# Patient Record
Sex: MALE | Race: BLACK OR AFRICAN AMERICAN | NOT HISPANIC OR LATINO | Employment: OTHER | URBAN - METROPOLITAN AREA
[De-identification: names, ages, dates, MRNs, and addresses within clinical notes are randomized per-mention and may not be internally consistent; named-entity substitution may affect disease eponyms.]

---

## 2022-06-07 PROBLEM — K92.2 ACUTE UPPER GASTROINTESTINAL HEMORRHAGE: Status: ACTIVE | Noted: 2022-06-07

## 2022-06-08 PROBLEM — D50.0 IRON DEFICIENCY ANEMIA DUE TO CHRONIC BLOOD LOSS: Status: ACTIVE | Noted: 2022-06-08

## 2022-06-15 DIAGNOSIS — B96.81 CHRONIC HELICOBACTER PYLORI GASTRITIS: Primary | ICD-10-CM

## 2022-06-15 DIAGNOSIS — K29.50 CHRONIC HELICOBACTER PYLORI GASTRITIS: Primary | ICD-10-CM

## 2022-06-15 RX ORDER — AMOXICILLIN 500 MG/1
1000 CAPSULE ORAL 2 TIMES DAILY
Qty: 56 CAPSULE | Refills: 0 | Status: SHIPPED | OUTPATIENT
Start: 2022-06-15 | End: 2022-06-29

## 2022-06-15 RX ORDER — PANTOPRAZOLE SODIUM 40 MG/1
40 TABLET, DELAYED RELEASE ORAL 2 TIMES DAILY
Qty: 28 TABLET | Refills: 0 | Status: SHIPPED | OUTPATIENT
Start: 2022-06-15 | End: 2022-07-07

## 2022-06-15 RX ORDER — CLARITHROMYCIN 500 MG/1
500 TABLET, FILM COATED ORAL 2 TIMES DAILY
Qty: 28 TABLET | Refills: 0 | Status: SHIPPED | OUTPATIENT
Start: 2022-06-15 | End: 2022-06-29

## 2022-06-16 ENCOUNTER — TELEPHONE (OUTPATIENT)
Dept: GASTROENTEROLOGY | Facility: CLINIC | Age: 67
End: 2022-06-16
Payer: MEDICAID

## 2022-06-16 NOTE — TELEPHONE ENCOUNTER
Patient called me back and was given the results and the instructions of the medication.. He has a appt 7/7/2022

## 2022-07-07 ENCOUNTER — OFFICE VISIT (OUTPATIENT)
Dept: GASTROENTEROLOGY | Facility: CLINIC | Age: 67
End: 2022-07-07
Payer: MEDICARE

## 2022-07-07 VITALS
HEART RATE: 83 BPM | SYSTOLIC BLOOD PRESSURE: 127 MMHG | HEIGHT: 73 IN | WEIGHT: 199.31 LBS | DIASTOLIC BLOOD PRESSURE: 69 MMHG | BODY MASS INDEX: 26.42 KG/M2 | OXYGEN SATURATION: 97 %

## 2022-07-07 DIAGNOSIS — K29.50 CHRONIC HELICOBACTER PYLORI GASTRITIS: ICD-10-CM

## 2022-07-07 DIAGNOSIS — B96.81 CHRONIC HELICOBACTER PYLORI GASTRITIS: ICD-10-CM

## 2022-07-07 DIAGNOSIS — D50.0 IRON DEFICIENCY ANEMIA DUE TO CHRONIC BLOOD LOSS: ICD-10-CM

## 2022-07-07 DIAGNOSIS — R10.13 EPIGASTRIC PAIN: Primary | ICD-10-CM

## 2022-07-07 PROCEDURE — 1101F PR PT FALLS ASSESS DOC 0-1 FALLS W/OUT INJ PAST YR: ICD-10-PCS | Mod: CPTII,S$GLB,, | Performed by: INTERNAL MEDICINE

## 2022-07-07 PROCEDURE — 99999 PR PBB SHADOW E&M-EST. PATIENT-LVL IV: ICD-10-PCS | Mod: PBBFAC,,, | Performed by: INTERNAL MEDICINE

## 2022-07-07 PROCEDURE — 99999 PR PBB SHADOW E&M-EST. PATIENT-LVL IV: CPT | Mod: PBBFAC,,, | Performed by: INTERNAL MEDICINE

## 2022-07-07 PROCEDURE — 3074F PR MOST RECENT SYSTOLIC BLOOD PRESSURE < 130 MM HG: ICD-10-PCS | Mod: CPTII,S$GLB,, | Performed by: INTERNAL MEDICINE

## 2022-07-07 PROCEDURE — 4010F PR ACE/ARB THEARPY RXD/TAKEN: ICD-10-PCS | Mod: CPTII,S$GLB,, | Performed by: INTERNAL MEDICINE

## 2022-07-07 PROCEDURE — 3008F BODY MASS INDEX DOCD: CPT | Mod: CPTII,S$GLB,, | Performed by: INTERNAL MEDICINE

## 2022-07-07 PROCEDURE — 3288F PR FALLS RISK ASSESSMENT DOCUMENTED: ICD-10-PCS | Mod: CPTII,S$GLB,, | Performed by: INTERNAL MEDICINE

## 2022-07-07 PROCEDURE — 99214 OFFICE O/P EST MOD 30 MIN: CPT | Mod: S$GLB,,, | Performed by: INTERNAL MEDICINE

## 2022-07-07 PROCEDURE — 1125F AMNT PAIN NOTED PAIN PRSNT: CPT | Mod: CPTII,S$GLB,, | Performed by: INTERNAL MEDICINE

## 2022-07-07 PROCEDURE — 1125F PR PAIN SEVERITY QUANTIFIED, PAIN PRESENT: ICD-10-PCS | Mod: CPTII,S$GLB,, | Performed by: INTERNAL MEDICINE

## 2022-07-07 PROCEDURE — 3008F PR BODY MASS INDEX (BMI) DOCUMENTED: ICD-10-PCS | Mod: CPTII,S$GLB,, | Performed by: INTERNAL MEDICINE

## 2022-07-07 PROCEDURE — 3078F DIAST BP <80 MM HG: CPT | Mod: CPTII,S$GLB,, | Performed by: INTERNAL MEDICINE

## 2022-07-07 PROCEDURE — 1159F MED LIST DOCD IN RCRD: CPT | Mod: CPTII,S$GLB,, | Performed by: INTERNAL MEDICINE

## 2022-07-07 PROCEDURE — 3074F SYST BP LT 130 MM HG: CPT | Mod: CPTII,S$GLB,, | Performed by: INTERNAL MEDICINE

## 2022-07-07 PROCEDURE — 3288F FALL RISK ASSESSMENT DOCD: CPT | Mod: CPTII,S$GLB,, | Performed by: INTERNAL MEDICINE

## 2022-07-07 PROCEDURE — 99214 PR OFFICE/OUTPT VISIT, EST, LEVL IV, 30-39 MIN: ICD-10-PCS | Mod: S$GLB,,, | Performed by: INTERNAL MEDICINE

## 2022-07-07 PROCEDURE — 1159F PR MEDICATION LIST DOCUMENTED IN MEDICAL RECORD: ICD-10-PCS | Mod: CPTII,S$GLB,, | Performed by: INTERNAL MEDICINE

## 2022-07-07 PROCEDURE — 1101F PT FALLS ASSESS-DOCD LE1/YR: CPT | Mod: CPTII,S$GLB,, | Performed by: INTERNAL MEDICINE

## 2022-07-07 PROCEDURE — 3078F PR MOST RECENT DIASTOLIC BLOOD PRESSURE < 80 MM HG: ICD-10-PCS | Mod: CPTII,S$GLB,, | Performed by: INTERNAL MEDICINE

## 2022-07-07 PROCEDURE — 4010F ACE/ARB THERAPY RXD/TAKEN: CPT | Mod: CPTII,S$GLB,, | Performed by: INTERNAL MEDICINE

## 2022-07-07 RX ORDER — FLUTICASONE PROPIONATE 50 MCG
1 SPRAY, SUSPENSION (ML) NASAL DAILY
COMMUNITY
Start: 2022-05-16

## 2022-07-07 RX ORDER — KRILL/OM-3/DHA/EPA/PHOSPHO/AST 1000-230MG
CAPSULE ORAL
COMMUNITY

## 2022-07-07 RX ORDER — ASPIRIN 81 MG/1
81 TABLET ORAL
COMMUNITY

## 2022-07-07 RX ORDER — SILDENAFIL 50 MG/1
50 TABLET, FILM COATED ORAL DAILY PRN
COMMUNITY
Start: 2022-05-27 | End: 2023-08-28

## 2022-07-07 RX ORDER — PANTOPRAZOLE SODIUM 40 MG/1
40 TABLET, DELAYED RELEASE ORAL DAILY
Qty: 90 TABLET | Refills: 3 | Status: SHIPPED | OUTPATIENT
Start: 2022-07-07 | End: 2023-07-03 | Stop reason: SDUPTHER

## 2022-07-07 RX ORDER — ATORVASTATIN CALCIUM 10 MG/1
10 TABLET, FILM COATED ORAL DAILY
COMMUNITY
Start: 2022-06-24

## 2022-07-07 RX ORDER — LISINOPRIL 2.5 MG/1
2.5 TABLET ORAL DAILY
COMMUNITY
Start: 2022-06-24

## 2022-07-07 RX ORDER — AMLODIPINE BESYLATE 10 MG/1
10 TABLET ORAL DAILY
COMMUNITY
Start: 2022-05-16

## 2022-07-07 NOTE — PROGRESS NOTES
GASTROENTEROLOGY CLINIC NOTE    Reason for visit: The primary encounter diagnosis was Epigastric pain. Diagnoses of Chronic Helicobacter pylori gastritis and Iron deficiency anemia due to chronic blood loss were also pertinent to this visit.  Referring provider/PCP: SELWYN Novak    HPI:  Poli Young is a 66 y.o. male here today for follow up iron def.    Hospital followup. See bloew for EGD results.  abd pain has improved. Aching in left side has gone. Some gurlging in the abd.   Completed all of triple therapy, unclear if he truly completed them all at same time. He thinks he is still taking protonix.  No vomiting. No changes in bowels. No blood in stool    Prior Endoscopy:  EGD:  6/2022  Impression:            - Normal esophagus.                          - Gastritis, very mild and clinically                          insignificant. Biopsied.                          - Prominent area of the papilla found in the                          duodenum. Biopsied. suspected benign.   Recommendation:        - return to [facility], possible D/C same day.                          - Patient has a contact number available for                          emergencies. The signs and symptoms of potential                          delayed complications were discussed with the                          patient. Return to normal activities tomorrow.                          Written discharge instructions were provided to                          the patient.                          - Resume previous diet.                          - Continue present medications.                          - Await pathology results.                          - Return to GI clinic at the next available                          appointment.                          - Await path to rule out Hpylori. Consider                          colonoscopy as outpatient if pathology negative.                          - Would obtain non-urgent CT scan of abd /  pelvis                          to rule out anh-ampullary changes.   PATH  RELIAPATH DIAGNOSIS:   A.   DUODENUM, PAPILLA, BIOPSY:   - Duodenal mucosa with reactive epithelial changes and increased chronic   inflammation within lamina propria.   - No Helicobacter pylori organisms identified by *IHC.   - Negative for dysplasia or malignancy.   B.   STOMACH, BIOPSY:   - Moderate chronic inactive gastritis with microfocal activity.   - Numerous Helicobacter pylori organisms identified by *IHC.   - Negative for intestinal metaplasia, dysplasia, or malignancy.     Colon:    He reports 2017 at [facility], normal repeat 10 year.    (Portions of this note were dictated using voice recognition software and may contain dictation related errors in spelling/grammar/syntax not found on text review)    Review of Systems   Constitutional: Negative for fever and malaise/fatigue.   Respiratory: Negative for cough and shortness of breath.    Cardiovascular: Negative for chest pain and palpitations.   Gastrointestinal: Negative for abdominal pain, blood in stool, nausea and vomiting.   Neurological: Negative for dizziness and headaches.       Past Medical History: has a past medical history of Hyperlipemia and Hypertension.    Past Surgical History: has a past surgical history that includes Esophagogastroduodenoscopy (N/A, 6/8/2022).    Family History:family history is not on file.    Allergies: Review of patient's allergies indicates:  No Known Allergies    Social History: reports that he has never smoked. He has never used smokeless tobacco.    Home medications:   Current Outpatient Medications on File Prior to Visit   Medication Sig Dispense Refill    amLODIPine (NORVASC) 10 MG tablet Take 10 mg by mouth once daily.      aspirin (ECOTRIN) 81 MG EC tablet Take 81 mg by mouth.      atorvastatin (LIPITOR) 10 MG tablet Take 10 mg by mouth once daily.      docusate sodium (COLACE) 100 MG capsule Take 1 capsule (100 mg total) by mouth  "2 (two) times daily. 180 capsule 1    ferrous sulfate 324 mg (65 mg iron) TbEC Take 1 tablet (324 mg total) by mouth once daily. 90 tablet 1    fluticasone propionate (FLONASE) 50 mcg/actuation nasal spray 1 spray by Each Nostril route once daily.      krill-om-3-dha-epa-phospho-ast 1,000-230-60 mg Cap MegaRed Omega-3 Krill Oil 1,000 mg-230 mg-60 mg capsule   Take 1 capsule by oral route.      lisinopriL (PRINIVIL,ZESTRIL) 2.5 MG tablet Take 2.5 mg by mouth once daily.      sildenafiL (VIAGRA) 50 MG tablet Take 50 mg by mouth daily as needed.      [DISCONTINUED] pantoprazole (PROTONIX) 40 MG tablet Take 1 tablet (40 mg total) by mouth 2 (two) times daily. for 14 days 28 tablet 0     No current facility-administered medications on file prior to visit.       Vital signs:  /69 (BP Location: Left arm, Patient Position: Sitting, BP Method: Large (Automatic))   Pulse 83   Ht 6' 1" (1.854 m)   Wt 90.4 kg (199 lb 4.7 oz)   SpO2 97%   BMI 26.29 kg/m²     Physical Exam  Vitals reviewed.   Constitutional:       Appearance: He is not toxic-appearing.   HENT:      Head: Normocephalic and atraumatic.   Eyes:      General: No scleral icterus.     Conjunctiva/sclera: Conjunctivae normal.   Neurological:      Mental Status: He is alert and oriented to person, place, and time.      Gait: Gait normal.   Psychiatric:         Mood and Affect: Mood normal.         Behavior: Behavior normal.         I have reviewed prior labs, imaging, notes from last month    Assessment:  1. Epigastric pain    2. Chronic Helicobacter pylori gastritis    3. Iron deficiency anemia due to chronic blood loss        Plan:  Orders Placed This Encounter    CT Abdomen With Contrast    H. pylori antigen, stool    CBC Auto Differential    Iron and TIBC    Ferritin    Creatinine, Serum    pantoprazole (PROTONIX) 40 MG tablet     Check CT with iv contrast abd , rule out pancreatic process or process near ampulaa , although biopsies benign and " suspected benign etiology of prominence  Continue iron    Hold protonix then turn in HP stool , then resume protonix daily    Check iron studies and cbc.  Continue oral iron      RTC based on above    Aamir Rushing MD  [facility]

## 2022-07-13 DIAGNOSIS — K56.1 INTUSSUSCEPTION: Primary | ICD-10-CM

## 2022-07-14 ENCOUNTER — TELEPHONE (OUTPATIENT)
Dept: GASTROENTEROLOGY | Facility: CLINIC | Age: 67
End: 2022-07-14
Payer: MEDICAID

## 2022-07-14 NOTE — TELEPHONE ENCOUNTER
Gave patient an appt for 7/14/2022 at 3:30 pm.      ----- Message from Aamir Rushing MD sent at 7/13/2022  5:32 PM CDT -----  Attempted to call numerous times , please inform him    He needs to see surgery asap, referral placed  Possible intusseception (twisting of intestine)    Also small panc cyst, we can monitor this in 6 months.    Please make sure he has follow up in 2-3 months with us...    Continue the oral iron, blood counts are slowly improving.

## 2022-09-14 ENCOUNTER — OFFICE VISIT (OUTPATIENT)
Dept: GASTROENTEROLOGY | Facility: CLINIC | Age: 67
End: 2022-09-14
Payer: MEDICARE

## 2022-09-14 VITALS
DIASTOLIC BLOOD PRESSURE: 76 MMHG | WEIGHT: 205.5 LBS | SYSTOLIC BLOOD PRESSURE: 135 MMHG | OXYGEN SATURATION: 98 % | BODY MASS INDEX: 27.23 KG/M2 | HEART RATE: 75 BPM | HEIGHT: 73 IN

## 2022-09-14 DIAGNOSIS — K56.1 INTUSSUSCEPTION: Primary | ICD-10-CM

## 2022-09-14 DIAGNOSIS — D50.0 IRON DEFICIENCY ANEMIA DUE TO CHRONIC BLOOD LOSS: ICD-10-CM

## 2022-09-14 PROCEDURE — 3075F PR MOST RECENT SYSTOLIC BLOOD PRESS GE 130-139MM HG: ICD-10-PCS | Mod: CPTII,S$GLB,, | Performed by: INTERNAL MEDICINE

## 2022-09-14 PROCEDURE — 99214 PR OFFICE/OUTPT VISIT, EST, LEVL IV, 30-39 MIN: ICD-10-PCS | Mod: S$GLB,,, | Performed by: INTERNAL MEDICINE

## 2022-09-14 PROCEDURE — 3008F PR BODY MASS INDEX (BMI) DOCUMENTED: ICD-10-PCS | Mod: CPTII,S$GLB,, | Performed by: INTERNAL MEDICINE

## 2022-09-14 PROCEDURE — 4010F PR ACE/ARB THEARPY RXD/TAKEN: ICD-10-PCS | Mod: CPTII,S$GLB,, | Performed by: INTERNAL MEDICINE

## 2022-09-14 PROCEDURE — 1125F AMNT PAIN NOTED PAIN PRSNT: CPT | Mod: CPTII,S$GLB,, | Performed by: INTERNAL MEDICINE

## 2022-09-14 PROCEDURE — 3078F PR MOST RECENT DIASTOLIC BLOOD PRESSURE < 80 MM HG: ICD-10-PCS | Mod: CPTII,S$GLB,, | Performed by: INTERNAL MEDICINE

## 2022-09-14 PROCEDURE — 3008F BODY MASS INDEX DOCD: CPT | Mod: CPTII,S$GLB,, | Performed by: INTERNAL MEDICINE

## 2022-09-14 PROCEDURE — 4010F ACE/ARB THERAPY RXD/TAKEN: CPT | Mod: CPTII,S$GLB,, | Performed by: INTERNAL MEDICINE

## 2022-09-14 PROCEDURE — 3288F FALL RISK ASSESSMENT DOCD: CPT | Mod: CPTII,S$GLB,, | Performed by: INTERNAL MEDICINE

## 2022-09-14 PROCEDURE — 1101F PR PT FALLS ASSESS DOC 0-1 FALLS W/OUT INJ PAST YR: ICD-10-PCS | Mod: CPTII,S$GLB,, | Performed by: INTERNAL MEDICINE

## 2022-09-14 PROCEDURE — 1159F PR MEDICATION LIST DOCUMENTED IN MEDICAL RECORD: ICD-10-PCS | Mod: CPTII,S$GLB,, | Performed by: INTERNAL MEDICINE

## 2022-09-14 PROCEDURE — 1101F PT FALLS ASSESS-DOCD LE1/YR: CPT | Mod: CPTII,S$GLB,, | Performed by: INTERNAL MEDICINE

## 2022-09-14 PROCEDURE — 1125F PR PAIN SEVERITY QUANTIFIED, PAIN PRESENT: ICD-10-PCS | Mod: CPTII,S$GLB,, | Performed by: INTERNAL MEDICINE

## 2022-09-14 PROCEDURE — 3075F SYST BP GE 130 - 139MM HG: CPT | Mod: CPTII,S$GLB,, | Performed by: INTERNAL MEDICINE

## 2022-09-14 PROCEDURE — 99999 PR PBB SHADOW E&M-EST. PATIENT-LVL IV: ICD-10-PCS | Mod: PBBFAC,,, | Performed by: INTERNAL MEDICINE

## 2022-09-14 PROCEDURE — 1159F MED LIST DOCD IN RCRD: CPT | Mod: CPTII,S$GLB,, | Performed by: INTERNAL MEDICINE

## 2022-09-14 PROCEDURE — 3078F DIAST BP <80 MM HG: CPT | Mod: CPTII,S$GLB,, | Performed by: INTERNAL MEDICINE

## 2022-09-14 PROCEDURE — 99999 PR PBB SHADOW E&M-EST. PATIENT-LVL IV: CPT | Mod: PBBFAC,,, | Performed by: INTERNAL MEDICINE

## 2022-09-14 PROCEDURE — 99214 OFFICE O/P EST MOD 30 MIN: CPT | Mod: S$GLB,,, | Performed by: INTERNAL MEDICINE

## 2022-09-14 PROCEDURE — 3288F PR FALLS RISK ASSESSMENT DOCUMENTED: ICD-10-PCS | Mod: CPTII,S$GLB,, | Performed by: INTERNAL MEDICINE

## 2022-09-14 NOTE — LETTER
September 14, 2022        Khai Blake MD  [address]  [address]  [place] [address]             [facility] [address]  [address]  [place] [address]  Phone: [phone]  Fax: [phone]   Patient: Poli Young   MR Number: [id number]   YOB: 1955   Date of Visit: 9/14/2022       Dear Dr. Blake:    Thank you for referring Poli Young to me for evaluation. Below are the relevant portions of my assessment and plan of care.            If you have questions, please do not hesitate to call me. I look forward to following Poli along with you.    Sincerely,      Aamir Rushing MD           CC    No Recipients

## 2022-09-15 ENCOUNTER — TELEPHONE (OUTPATIENT)
Dept: GASTROENTEROLOGY | Facility: CLINIC | Age: 67
End: 2022-09-15
Payer: MEDICAID

## 2022-09-15 NOTE — PROGRESS NOTES
GASTROENTEROLOGY CLINIC NOTE    Reason for visit: The primary encounter diagnosis was Intussusception. A diagnosis of Iron deficiency anemia due to chronic blood loss was also pertinent to this visit.  Referring provider/PCP: SELWYN Novak    HPI:  Poli Young is a 66 y.o. male here today for follow up iron def intussuception    Interval  Had CT that showed intussusception. Apparently our surgeon called him for the appt and he was told nothing further to do.  Continues with very intermittent, usually before meals, will have some sharp and crampy type mid abdominal pains.  This is very self-limited and goes away on its own.  He states this is somewhat of a daily occurrence.  It is a crampy type nature of the pain.    He has been taking iron regularly and he does note that his stool is very dark now.  No red blood in the stool.  No vomiting blood.  He does have a history of iron deficiency.  Of note he was H pylori stool negative and this confirmed eradication    =================  Intial clinic visit  Hospital followup. See bloew for EGD results.  abd pain has improved. Aching in left side has gone. Some gurlging in the abd.   Completed all of triple therapy, unclear if he truly completed them all at same time. He thinks he is still taking protonix.  No vomiting. No changes in bowels. No blood in stool    Prior Endoscopy:  EGD:  6/2022  Impression:            - Normal esophagus.                          - Gastritis, very mild and clinically                          insignificant. Biopsied.                          - Prominent area of the papilla found in the                          duodenum. Biopsied. suspected benign.   Recommendation:        - return to [facility]", possible D/C same day.                          - Patient has a contact number available for                          emergencies. The signs and symptoms of potential                          delayed complications were discussed with the                           patient. Return to normal activities tomorrow.                          Written discharge instructions were provided to                          the patient.                          - Resume previous diet.                          - Continue present medications.                          - Await pathology results.                          - Return to GI clinic at the next available                          appointment.                          - Await path to rule out Hpylori. Consider                          colonoscopy as outpatient if pathology negative.                          - Would obtain non-urgent CT scan of abd / pelvis                          to rule out anh-ampullary changes.   PATH  RELIAPATH DIAGNOSIS:   A.   DUODENUM, PAPILLA, BIOPSY:   - Duodenal mucosa with reactive epithelial changes and increased chronic   inflammation within lamina propria.   - No Helicobacter pylori organisms identified by *IHC.   - Negative for dysplasia or malignancy.   B.   STOMACH, BIOPSY:   - Moderate chronic inactive gastritis with microfocal activity.   - Numerous Helicobacter pylori organisms identified by *IHC.   - Negative for intestinal metaplasia, dysplasia, or malignancy.     Colon:    He reports 2017 at [facility], normal repeat 10 year.    (Portions of this note were dictated using voice recognition software and may contain dictation related errors in spelling/grammar/syntax not found on text review)    Review of Systems   Constitutional:  Negative for fever and malaise/fatigue.   Respiratory:  Negative for cough and shortness of breath.    Cardiovascular:  Negative for chest pain and palpitations.   Gastrointestinal:  Negative for abdominal pain, blood in stool, nausea and vomiting.   Neurological:  Negative for dizziness and headaches.     Past Medical History: has a past medical history of Hyperlipemia and Hypertension.    Past Surgical History: has a past surgical history that includes  "Esophagogastroduodenoscopy (N/A, 6/8/2022).    Family History:family history is not on file.    Allergies: Review of patient's allergies indicates:  No Known Allergies    Social History: reports that he has never smoked. He has never used smokeless tobacco.    Home medications:   Current Outpatient Medications on File Prior to Visit   Medication Sig Dispense Refill    amLODIPine (NORVASC) 10 MG tablet Take 10 mg by mouth once daily.      aspirin (ECOTRIN) 81 MG EC tablet Take 81 mg by mouth.      atorvastatin (LIPITOR) 10 MG tablet Take 10 mg by mouth once daily.      docusate sodium (COLACE) 100 MG capsule Take 1 capsule (100 mg total) by mouth 2 (two) times daily. 180 capsule 1    ferrous sulfate 324 mg (65 mg iron) TbEC Take 1 tablet (324 mg total) by mouth once daily. 90 tablet 1    fluticasone propionate (FLONASE) 50 mcg/actuation nasal spray 1 spray by Each Nostril route once daily.      krill-om-3-dha-epa-phospho-ast 1,000-230-60 mg Cap MegaRed Omega-3 Krill Oil 1,000 mg-230 mg-60 mg capsule   Take 1 capsule by oral route.      lisinopriL (PRINIVIL,ZESTRIL) 2.5 MG tablet Take 2.5 mg by mouth once daily.      pantoprazole (PROTONIX) 40 MG tablet Take 1 tablet (40 mg total) by mouth once daily. 90 tablet 3    sildenafiL (VIAGRA) 50 MG tablet Take 50 mg by mouth daily as needed.       No current facility-administered medications on file prior to visit.       Vital signs:  /76 (BP Location: Right arm, Patient Position: Sitting, BP Method: Large (Automatic))   Pulse 75   Ht 6' 1" (1.854 m)   Wt 93.2 kg (205 lb 7.5 oz)   SpO2 98%   BMI 27.11 kg/m²     Physical Exam  Vitals reviewed.   Constitutional:       Appearance: He is not toxic-appearing.   HENT:      Head: Normocephalic and atraumatic.   Eyes:      General: No scleral icterus.     Conjunctiva/sclera: Conjunctivae normal.   Neurological:      Mental Status: He is alert and oriented to person, place, and time.      Gait: Gait normal.   Psychiatric:  "        Mood and Affect: Mood normal.         Behavior: Behavior normal.       I have reviewed prior labs, imaging, notes from last month    Assessment:  1. Intussusception    2. Iron deficiency anemia due to chronic blood loss      Intermittent continued colicky type abd pains, completely will resolve then returns most days very briefly  He apparently discussed the intussusception with surgeon who did not recommend anything further    I think we need to fully eval the small bowel and rule out any lesions, anahi in setting of iron def anemia  Certainly the iron def anemia could be from the HP which is now eradicated.    Plan:  Orders Placed This Encounter    FL Small Bowel Follow Through    Ferritin    CBC Auto Differential    Iron and TIBC     Continue iron  Check cbc and iron stores    Set up small bowel series xrays  If negative, will proceed with VCE. He is agreeable to do this at [facility] based on above   - if all negative may need to consider colonoscopy    Aamir Rushing MD  [facility]

## 2022-09-15 NOTE — TELEPHONE ENCOUNTER
Patient notified and understands.      ----- Message from Aamir Rushing MD sent at 9/14/2022  9:04 PM CDT -----  Please inform the patient  Blood counts have continued to improve  The iron counts are now normal  I would continue taking the iron for next few months still

## 2022-12-08 ENCOUNTER — TELEPHONE (OUTPATIENT)
Dept: GASTROENTEROLOGY | Facility: CLINIC | Age: 67
End: 2022-12-08
Payer: MEDICARE

## 2022-12-08 NOTE — TELEPHONE ENCOUNTER
----- Message from Janine Cottrell RN sent at 12/8/2022  9:33 AM CST -----  Contact: Janine Mello the name of the caller was Janine and this patient needs a small bowel follow through   ----- Message -----  From: Kaleigh Iraheta MA  Sent: 12/7/2022   2:16 PM CST  To: Janine Cottrell RN    Did you call this patient?      ----- Message -----  From: Su Jimenez  Sent: 12/7/2022   1:54 PM CST  To: Сергей ARREDONDO Staff    Type:   Appointment Request      Name of Caller:Janine OLSON Small Bowel Follow Through  Would the patient rather a call back or a response [facility]? Call back  Best Call Back Number:[phone]  Additional Information: Please assist

## 2022-12-27 ENCOUNTER — TELEPHONE (OUTPATIENT)
Dept: GASTROENTEROLOGY | Facility: CLINIC | Age: 67
End: 2022-12-27
Payer: MEDICARE

## 2022-12-27 NOTE — TELEPHONE ENCOUNTER
"----- Message from Adilia Calero sent at 12/27/2022  8:20 AM CST -----  Regarding: Consult/Advisory    Name Of Caller: Wife    Contact Preference?: [phone]           Provider Name:   Does patient feel the need to be seen today?           What is the nature of the call?: Pt is calling because the auth have not been approve and need to speak with nurse.           Additional Notes:  "Thank you for all that you do for our patients'"     "

## 2023-01-10 ENCOUNTER — HOSPITAL ENCOUNTER (OUTPATIENT)
Dept: RADIOLOGY | Facility: HOSPITAL | Age: 68
Discharge: HOME | End: 2023-01-10
Attending: INTERNAL MEDICINE
Payer: MEDICARE

## 2023-01-10 DIAGNOSIS — K56.1 INTUSSUSCEPTION: ICD-10-CM

## 2023-01-10 DIAGNOSIS — D50.0 IRON DEFICIENCY ANEMIA DUE TO CHRONIC BLOOD LOSS: ICD-10-CM

## 2023-01-10 PROCEDURE — 74250 FL SMALL BOWEL FOLLOW THROUGH: ICD-10-PCS | Mod: 26,,, | Performed by: STUDENT IN AN ORGANIZED HEALTH CARE EDUCATION/TRAINING PROGRAM

## 2023-01-10 PROCEDURE — A9698 NON-RAD CONTRAST MATERIALNOC: HCPCS | Performed by: INTERNAL MEDICINE

## 2023-01-10 PROCEDURE — 25500020 PHARM REV CODE 255: Performed by: INTERNAL MEDICINE

## 2023-01-10 PROCEDURE — 74250 X-RAY XM SM INT 1CNTRST STD: CPT | Mod: TC,FY

## 2023-01-10 PROCEDURE — 74250 X-RAY XM SM INT 1CNTRST STD: CPT | Mod: 26,,, | Performed by: STUDENT IN AN ORGANIZED HEALTH CARE EDUCATION/TRAINING PROGRAM

## 2023-01-10 RX ADMIN — BARIUM SULFATE 590 ML: 0.6 SUSPENSION ORAL at 08:01

## 2023-01-12 ENCOUNTER — TELEPHONE (OUTPATIENT)
Dept: GASTROENTEROLOGY | Facility: CLINIC | Age: 68
End: 2023-01-12
Payer: MEDICARE

## 2023-01-12 DIAGNOSIS — D50.0 IRON DEFICIENCY ANEMIA DUE TO CHRONIC BLOOD LOSS: Primary | ICD-10-CM

## 2023-01-12 NOTE — TELEPHONE ENCOUNTER
Spoke with patients spouse and was able to schedule the VCE on 1/20/23 at the [facility]. Instructions reviewed. They will go to the [facility] location to  instructions. Patient spouse continues to ask about authorization for the VCE. I informed her that we have a pre service department that does this.                               What is a Video Capsule Endoscopy?  A video capsule endoscopy is a procedure that uses a tiny wireless camera to take pictures of your digestive tract. A capsule endoscopy camera sits inside a vitamin-size capsule that you will swallow. As the capsule travels through your digestive tract, the camera takes thousands of pictures that are transmitted to a recorder you wear on a belt around your waist.       DAY BEFORE: January 19, 2023  You will start your clear liquid diet (see attached list) beginning at NOON. After 7pm you will take nothing by mouth except necessary medication with a small sip of water and your bowel prep.   At noon, mix the 4.1 oz bottle (119 gram) of Miralax and the 32oz Gatorade (use white/clear color) products, place in the refrigerator (do not add ice).   At 7pm you will drink 8oz of Miralax/Gatorade mixture every 15 minutes until mixture is gone (a total of 32oz)  Just before going to bed, take one dose of over the counter Phazyme or Simethicone as directed on the box.       Day of Swallowing Capsule: January 20 , 2023  You will check in at the Admitting desk on the first floor of the [facility]. Do not wear any lipstick or chap stick to appointment.    After you check in, you will meet with a nurse or medical assistant who will go over instructions, ensure consent is complete, and give you a small video capsule to swallow.   You will be given water to drink when ingesting the capsule.   You may drink water throughout the test and are encouraged to walk as much as possible.   Oral medications (except for diabetic medication) may be taken at  10:00am  You may drink 8ozs of clear liquids (see attached list) at 10:00am and again at 2:00pm  Do not exercise Avoid heavy lifting. You can drive a car. You may return to work, if your work allows you to. Avoid unsuitable environments and/or physical movements.   Avoid getting the recorder or sensor belt wet  Observe the LED light on the data recorder at least every 15 minutes. If the light stops blinking blue, document the time and call our office at [phone].   Return to our office at _________to have the equipment removed. Your physician will discuss with you the outcome of the test and any further course of action needed.   After ingesting the capsule and until it leaves your body, avoid being near any source of powerful electromagnetic fields such as one created near a MRI device for 30 days.       If you suffer from any abdominal pain, nausea, or vomiting during this test contact your physician immediately at [phone] or [phone].       CLEAR LIQUID DIET:   - Avoid Red, Orange, Purple, and/or Blue food coloring   - NO DAIRY   - You can have:  Coffee with sugar (no creamer), tea, water, soda, apple or white grape juice, chicken or beef broth/bouillon (no meat, noodles, or veggies), green/yellow popsicles, green/yellow Jell-O, lemonade.      [address] [facility] [address] [place] [address] [facility] [place] [phone]

## 2023-01-12 NOTE — TELEPHONE ENCOUNTER
The patient will be here today to  the instructions for the VCE that is being done at [place] on 1/20/2023

## 2023-01-12 NOTE — TELEPHONE ENCOUNTER
----- Message from Kay Galarza sent at 1/12/2023 12:25 PM CST -----  Needs advice from nurse:      Who Called:pt  Regarding:returning a call  Would the patient rather a call back or [facility]?  Best Call Back number:[phone]  Additional Info:

## 2023-01-18 ENCOUNTER — TELEPHONE (OUTPATIENT)
Dept: ENDOSCOPY | Facility: HOSPITAL | Age: 68
End: 2023-01-18
Payer: MEDICARE

## 2023-01-18 NOTE — TELEPHONE ENCOUNTER
Left voice and text messages instructing patient to call dept @ [phone] between 8am-3pm.    Arrival time to be given @ 0800  VCE  Covid - vacc

## 2023-01-18 NOTE — TELEPHONE ENCOUNTER
Spoke to patient about arrival time @ 0800.  VCE     DAY BEFORE: January 19, 2023  You will start your clear liquid diet (see attached list) beginning at NOON. After 7pm you will take nothing by mouth except necessary medication with a small sip of water and your bowel prep.   At noon, mix the 4.1 oz bottle (119 gram) of Miralax and the 32oz Gatorade (use white/clear color) products, place in the refrigerator (do not add ice).   At 7pm you will drink 8oz of Miralax/Gatorade mixture every 15 minutes until mixture is gone (a total of 32oz)  Just before going to bed, take one dose of over the counter Phazyme or Simethicone as directed on the box.

## 2023-01-20 ENCOUNTER — HOSPITAL ENCOUNTER (OUTPATIENT)
Facility: HOSPITAL | Age: 68
Discharge: HOME | End: 2023-01-20
Attending: INTERNAL MEDICINE | Admitting: INTERNAL MEDICINE
Payer: MEDICARE

## 2023-01-20 PROCEDURE — 91110 GI TRC IMG INTRAL ESOPH-ILE: CPT | Mod: TC | Performed by: INTERNAL MEDICINE

## 2023-01-20 PROCEDURE — 27200952 HC CAPSULE DELIVERY DEVICE: Performed by: INTERNAL MEDICINE

## 2023-02-08 ENCOUNTER — TELEPHONE (OUTPATIENT)
Dept: GASTROENTEROLOGY | Facility: CLINIC | Age: 68
End: 2023-02-08
Payer: MEDICARE

## 2023-02-08 NOTE — TELEPHONE ENCOUNTER
Clinic appt scheduled with pt on Monday, February 13, 2023 at 215pm, per referral by Dr. Taylor.  Pt given clinic address and repeated correctly.

## 2023-02-08 NOTE — PROVATION PATIENT INSTRUCTIONS
Discharge Summary/Instructions after an Endoscopic Procedure  Patient Name: Poli Young  Patient MRN: [id number]  Patient YOB: 1955 Friday, January 20, 2023  Aamir Rushing MD  Dear patient,  As a result of recent federal legislation (The Federal Cures Act), you may   receive lab or pathology results from your procedure in your [facility]   account before your physician is able to contact you. Your physician or   their representative will relay the results to you with their   recommendations at their soonest availability.  Thank you,  Your health is very important to us during the Covid Crisis. Following your   procedure today, you will receive a daily text for 2 weeks asking about   signs or symptoms of Covid 19.  Please respond to this text when you   receive it so we can follow up and keep you as safe as possible.   RESTRICTIONS:  During your procedure today, you received medications for sedation.  These   medications may affect your judgment, balance and coordination.  Therefore,   for 24 hours, you have the following restrictions:   - DO NOT drive a car, operate machinery, make legal/financial decisions,   sign important papers or drink alcohol.    ACTIVITY:  Today: no heavy lifting, straining or running due to procedural   sedation/anesthesia.  The following day: return to full activity including work.  DIET:  Eat and drink normally unless instructed otherwise.     TREATMENT FOR COMMON SIDE EFFECTS:  - Mild abdominal pain, nausea, belching, bloating or excessive gas:  rest,   eat lightly and use a heating pad.  - Sore Throat: treat with throat lozenges and/or gargle with warm salt   water.  - Because air was used during the procedure, expelling large amounts of air   from your rectum or belching is normal.  - If a bowel prep was taken, you may not have a bowel movement for 1-3 days.    This is normal.  SYMPTOMS TO WATCH FOR AND REPORT TO YOUR PHYSICIAN:  1. Abdominal pain or bloating, other  than gas cramps.  2. Chest pain.  3. Back pain.  4. Signs of infection such as: chills or fever occurring within 24 hours   after the procedure.  5. Rectal bleeding, which would show as bright red, maroon, or black stools.   (A tablespoon of blood from the rectum is not serious, especially if   hemorrhoids are present.)  6. Vomiting.  7. Weakness or dizziness.  GO DIRECTLY TO THE NEAREST EMERGENCY ROOM IF YOU HAVE ANY OF THE FOLLOWING:      Difficulty breathing              Chills and/or fever over 101 F   Persistent vomiting and/or vomiting blood   Severe abdominal pain   Severe chest pain   Black, tarry stools   Bleeding- more than one tablespoon   Any other symptom or condition that you feel may need urgent attention  Your doctor recommends these additional instructions:  If any biopsies were taken, your doctors clinic will contact you in 1 to 2   weeks with any results.  - Discharge patient to home.   - Resume previous diet.   - Refer to Dr. Cruz for consideration of balloon enteroscopy.  For questions, problems or results please call your physician - Aamir Rushing MD.  EMERGENCY PHONE NUMBER: [phone],  LAB RESULTS: [phone]  IF A COMPLICATION OR EMERGENCY SITUATION ARISES AND YOU ARE UNABLE TO REACH   YOUR PHYSICIAN - GO DIRECTLY TO THE EMERGENCY ROOM.  Aamir Rushing MD  2/7/2023 6:05:20 PM  This report has been verified and signed electronically.  Dear patient,  As a result of recent federal legislation (The Federal Cures Act), you may   receive lab or pathology results from your procedure in your [facility]   account before your physician is able to contact you. Your physician or   their representative will relay the results to you with their   recommendations at their soonest availability.  Thank you,  PROVATION

## 2023-02-13 ENCOUNTER — OFFICE VISIT (OUTPATIENT)
Dept: GASTROENTEROLOGY | Facility: CLINIC | Age: 68
End: 2023-02-13
Payer: MEDICARE

## 2023-02-13 VITALS
WEIGHT: 199.19 LBS | BODY MASS INDEX: 26.98 KG/M2 | TEMPERATURE: 98 F | DIASTOLIC BLOOD PRESSURE: 81 MMHG | HEART RATE: 70 BPM | HEIGHT: 72 IN | SYSTOLIC BLOOD PRESSURE: 147 MMHG

## 2023-02-13 DIAGNOSIS — D50.9 IRON DEFICIENCY ANEMIA, UNSPECIFIED IRON DEFICIENCY ANEMIA TYPE: Primary | ICD-10-CM

## 2023-02-13 NOTE — PATIENT INSTRUCTIONS
You are scheduled for an Upper DBE on ____Thursday, March 9, 2023 at [facility] at [address] [place], [place] [address], [place]  [address]_____________________________    You should eat light meals the day before the procedure and nothing to eat or drink after midnight the night before your procedure.    You will need to be at the 1st floor admission desk at the hospital on ________________________

## 2023-02-13 NOTE — PROGRESS NOTES
[facility]    CC: Iron deficiency anemia     HPI 67 y.o. male referred for iron deficiency anemia without overt GI bleeding. Has been dealing with iron deficiency for the last year. Prior to this he had not had anemia (hgb 14.1 2019). He was admitted to [facility] last year with symptomatic anemia requiring blood transfusion. Denies overt GI bleeding at that time. He takes oral iron daily. No prior parenteral iron. Not on anticoagulation.     VCE 1/2023 showing a plume of blood in the mid to proximal small bowel.     Past Medical History  Past Medical History:   Diagnosis Date    Hyperlipemia     Hypertension        Physical Examination  BP (!) 147/81 (BP Location: Left arm, Patient Position: Sitting, BP Method: Large (Automatic))   Pulse 70   Temp 97.9 °F (36.6 °C) (Oral)   Ht 6' (1.829 m)   Wt 90.4 kg (199 lb 3 oz)   BMI 27.01 kg/m²   General appearance: alert, cooperative, no distress  Lungs: normal work of breathing, no dullness to percussion bilaterally  Heart: regular rate and rhythm without rub; no displacement of the PMI   Abdomen: soft, non-tender; bowel sounds normoactive; no organomegaly  Lab Results   Component Value Date    WBC 6.50 09/14/2022    HGB 10.9 (L) 09/14/2022    HCT 35.1 (L) 09/14/2022    MCV 92 09/14/2022     09/14/2022       Reports from previous EGD and colonoscopy reviewed including color images which are unrevealing for bleeding sources     Assessment:   66 y/o M with iron deficiency anemia without overt GI bleeding. EGD 2022 unrevealing. Prior colonoscopy at [facility], normal per patient. VCE 1/2023 significant for plume of blood in the proximal to mid small bowel.      Plan:  - Plan for upper DBE 3/9/23 (short upper DBE with general anesthesia)   - Review VCE images at endoscopy   - Continue oral iron, may need to consider parenteral iron infusion if anemia worsens     Total encounter time >60 min including all elements     Giancarlo Cruz MD   [address] [place] [address]  [address]   [place] [address]   [phone]

## 2023-03-07 ENCOUNTER — TELEPHONE (OUTPATIENT)
Dept: ENDOSCOPY | Facility: HOSPITAL | Age: 68
End: 2023-03-07
Payer: MEDICARE

## 2023-03-07 NOTE — TELEPHONE ENCOUNTER
Left voice and text messages instructing patient to call dept @ [phone] between 8am-3pm.    Arrival time to be given @ 0915  Upper DBE (Hold Eliquis x5 days)  (Message sent via [facility])

## 2023-03-07 NOTE — TELEPHONE ENCOUNTER
Spoke with patient's significant other about arrival time @ 0915.   Upper DBE    NPO status reviewed: Light meals on Wednesday.Patient must have nothing to eat after midnight.      Medications: Do not take Insulin or oral diabetic medications the day of the procedure.  Take as prescribed: heart, seizure and blood pressure medication in the morning with a sip of water (less than an ounce).  Take any breathing medications and bring inhalers to hospital with you Leave all valuables and jewelry at home.     Wear comfortable clothes to procedure to change into hospital gown You cannot drive for 24 hours after your procedure because you will receive sedation for your procedure to make you comfortable.  A ride must be provided at discharge.

## 2023-03-08 ENCOUNTER — TELEPHONE (OUTPATIENT)
Dept: GASTROENTEROLOGY | Facility: CLINIC | Age: 68
End: 2023-03-08
Payer: MEDICARE

## 2023-03-08 NOTE — TELEPHONE ENCOUNTER
----- Message from Mio Hood RN sent at 3/8/2023  2:11 PM CST -----  Regarding: DBE  Patient called and stated that he needs to reschedule his procedure due to him not stopping his blood thinners. Please call to reschedule. Thanks!      Mio LYNN RN

## 2023-03-08 NOTE — TELEPHONE ENCOUNTER
"Pt requesting to reschedule upper dbe on 3/9/23, due to not stopping his blood thinners.  Pt notified instructions were to continue blood thinners.  Pt states "I have a  to attend.  Upper dbe rescheduled with pt to Monday, 2023.  Pt repeated date correctly.  "

## 2023-03-15 ENCOUNTER — TELEPHONE (OUTPATIENT)
Dept: GASTROENTEROLOGY | Facility: CLINIC | Age: 68
End: 2023-03-15
Payer: MEDICARE

## 2023-03-15 NOTE — TELEPHONE ENCOUNTER
----- Message from Tyson Pruitt sent at 3/15/2023  4:18 PM CDT -----  Type:  Patient Returning Call    Who Called:pt   Who Left Message for Patient:andreea  Does the patient know what this is regarding?:no  Would the patient rather a call back or a response via [facility]? Call   Best Call Back Number: [phone]  Additional Information:

## 2023-03-15 NOTE — TELEPHONE ENCOUNTER
Pt confirmed upper dbe scheduled on Monday, 3/20/23.  Acknowledged understanding of prep instructions.

## 2023-03-16 ENCOUNTER — TELEPHONE (OUTPATIENT)
Dept: ENDOSCOPY | Facility: HOSPITAL | Age: 68
End: 2023-03-16
Payer: MEDICARE

## 2023-03-16 NOTE — TELEPHONE ENCOUNTER
Left message instructing patient to call dept @ [phone] between 8am-3pm.    Arrival time to be given @ 0730  Upper DBE  (Message sent via [facility])

## 2023-03-17 ENCOUNTER — TELEPHONE (OUTPATIENT)
Dept: GASTROENTEROLOGY | Facility: CLINIC | Age: 68
End: 2023-03-17
Payer: MEDICARE

## 2023-03-17 ENCOUNTER — TELEPHONE (OUTPATIENT)
Dept: ENDOSCOPY | Facility: HOSPITAL | Age: 68
End: 2023-03-17
Payer: MEDICARE

## 2023-03-17 NOTE — TELEPHONE ENCOUNTER
Spoke with patient about arrival time @ 0730.   Upper E    NPO status reviewed: Light meals on Sunday. Patient must have nothing to eat after midnight.      Medications: Do not take Insulin or oral diabetic medications the day of the procedure.  Take as prescribed: heart, seizure and blood pressure medication in the morning with a sip of water (less than an ounce).  Take any breathing medications and bring inhalers to hospital with you Leave all valuables and jewelry at home.     Wear comfortable clothes to procedure to change into hospital gown You cannot drive for 24 hours after your procedure because you will receive sedation for your procedure to make you comfortable.  A ride must be provided at discharge.

## 2023-03-17 NOTE — TELEPHONE ENCOUNTER
Returned pt's call, message placed on voicemail.  Pt received arrival time this morning, 3/17/23 at 816am from Endoscopy staff.

## 2023-03-17 NOTE — TELEPHONE ENCOUNTER
----- Message from Zuleima Santo sent at 3/16/2023  2:56 PM CDT -----  Type:  Patient Returning Call    Who Called: Pt  Who Left Message for Patient:  Does the patient know what this is regarding?: Procedure time  Would the patient rather a call back or a response via [facility]?  Call   Best Call Back Number: [phone]  Additional Information:  Pt has a procedure on 3/30/30.

## 2023-03-19 NOTE — H&P
[facility]    CC: Iron deficiency anemia     HPI 67 y.o. male referred for iron deficiency anemia without overt GI bleeding. Has been dealing with iron deficiency for the last year. Prior to this he had not had anemia (hgb 14.1 2019). He was admitted to [facility] last year with symptomatic anemia requiring blood transfusion. Denies overt GI bleeding at that time. He takes oral iron daily. No prior parenteral iron. Not on anticoagulation.     VCE 1/2023 showing a plume of blood in the mid to proximal small bowel.     Past Medical History  Past Medical History:   Diagnosis Date    Hyperlipemia     Hypertension        Physical Examination  There were no vitals taken for this visit.  General appearance: alert, cooperative, no distress  Lungs: normal work of breathing, no dullness to percussion bilaterally  Heart: regular rate and rhythm without rub; no displacement of the PMI   Abdomen: soft, non-tender; bowel sounds normoactive; no organomegaly  Lab Results   Component Value Date    WBC 6.50 09/14/2022    HGB 10.9 (L) 09/14/2022    HCT 35.1 (L) 09/14/2022    MCV 92 09/14/2022     09/14/2022       Reports from previous EGD and colonoscopy reviewed including color images which are unrevealing for bleeding sources     Assessment:   66 y/o M with iron deficiency anemia without overt GI bleeding. EGD 2022 unrevealing. Prior colonoscopy at [facility], normal per patient. VCE 1/2023 significant for plume of blood in the proximal to mid small bowel.      Plan:  - Upper DBE today (short upper DBE with general anesthesia)     Giancarlo Cruz MD   [address] [facility], [address]   [place] [address] [phone]

## 2023-03-20 ENCOUNTER — ANESTHESIA EVENT (OUTPATIENT)
Dept: ENDOSCOPY | Facility: HOSPITAL | Age: 68
End: 2023-03-20
Payer: MEDICARE

## 2023-03-20 ENCOUNTER — ANESTHESIA (OUTPATIENT)
Dept: ENDOSCOPY | Facility: HOSPITAL | Age: 68
End: 2023-03-20
Payer: MEDICARE

## 2023-03-20 ENCOUNTER — HOSPITAL ENCOUNTER (OUTPATIENT)
Facility: HOSPITAL | Age: 68
Discharge: HOME | End: 2023-03-20
Attending: INTERNAL MEDICINE | Admitting: INTERNAL MEDICINE
Payer: MEDICARE

## 2023-03-20 VITALS
HEART RATE: 72 BPM | WEIGHT: 199 LBS | DIASTOLIC BLOOD PRESSURE: 95 MMHG | TEMPERATURE: 98 F | BODY MASS INDEX: 26.95 KG/M2 | HEIGHT: 72 IN | SYSTOLIC BLOOD PRESSURE: 140 MMHG | RESPIRATION RATE: 20 BRPM | OXYGEN SATURATION: 100 %

## 2023-03-20 DIAGNOSIS — D50.0 IRON DEFICIENCY ANEMIA DUE TO CHRONIC BLOOD LOSS: Primary | ICD-10-CM

## 2023-03-20 DIAGNOSIS — D50.9 IRON DEFICIENCY ANEMIA: ICD-10-CM

## 2023-03-20 PROCEDURE — D9220A PRA ANESTHESIA: Mod: CRNA,,, | Performed by: NURSE ANESTHETIST, CERTIFIED REGISTERED

## 2023-03-20 PROCEDURE — 37000008 HC ANESTHESIA 1ST 15 MINUTES: Performed by: INTERNAL MEDICINE

## 2023-03-20 PROCEDURE — D9220A PRA ANESTHESIA: ICD-10-PCS | Mod: CRNA,,, | Performed by: NURSE ANESTHETIST, CERTIFIED REGISTERED

## 2023-03-20 PROCEDURE — 44377 SMALL BOWEL ENDOSCOPY/BIOPSY: CPT | Performed by: INTERNAL MEDICINE

## 2023-03-20 PROCEDURE — 27201030 HC OVERTUBE: Performed by: INTERNAL MEDICINE

## 2023-03-20 PROCEDURE — 37000009 HC ANESTHESIA EA ADD 15 MINS: Performed by: INTERNAL MEDICINE

## 2023-03-20 PROCEDURE — 88342 IMHCHEM/IMCYTCHM 1ST ANTB: CPT | Performed by: STUDENT IN AN ORGANIZED HEALTH CARE EDUCATION/TRAINING PROGRAM

## 2023-03-20 PROCEDURE — 88341 IMHCHEM/IMCYTCHM EA ADD ANTB: CPT | Mod: 59 | Performed by: STUDENT IN AN ORGANIZED HEALTH CARE EDUCATION/TRAINING PROGRAM

## 2023-03-20 PROCEDURE — 25000003 PHARM REV CODE 250: Performed by: NURSE ANESTHETIST, CERTIFIED REGISTERED

## 2023-03-20 PROCEDURE — D9220A PRA ANESTHESIA: ICD-10-PCS | Mod: ANES,,, | Performed by: ANESTHESIOLOGY

## 2023-03-20 PROCEDURE — 88305 TISSUE EXAM BY PATHOLOGIST: CPT | Mod: 26,,, | Performed by: STUDENT IN AN ORGANIZED HEALTH CARE EDUCATION/TRAINING PROGRAM

## 2023-03-20 PROCEDURE — 27201012 HC FORCEPS, HOT/COLD, DISP: Performed by: INTERNAL MEDICINE

## 2023-03-20 PROCEDURE — 27201028 HC NEEDLE, SCLERO: Performed by: INTERNAL MEDICINE

## 2023-03-20 PROCEDURE — D9220A PRA ANESTHESIA: Mod: ANES,,, | Performed by: ANESTHESIOLOGY

## 2023-03-20 PROCEDURE — 88342 IMHCHEM/IMCYTCHM 1ST ANTB: CPT | Mod: 26,,, | Performed by: STUDENT IN AN ORGANIZED HEALTH CARE EDUCATION/TRAINING PROGRAM

## 2023-03-20 PROCEDURE — 88341 PR IHC OR ICC EACH ADD'L SINGLE ANTIBODY  STAINPR: ICD-10-PCS | Mod: 26,,, | Performed by: STUDENT IN AN ORGANIZED HEALTH CARE EDUCATION/TRAINING PROGRAM

## 2023-03-20 PROCEDURE — 88305 TISSUE EXAM BY PATHOLOGIST: ICD-10-PCS | Mod: 26,,, | Performed by: STUDENT IN AN ORGANIZED HEALTH CARE EDUCATION/TRAINING PROGRAM

## 2023-03-20 PROCEDURE — 88342 CHG IMMUNOCYTOCHEMISTRY: ICD-10-PCS | Mod: 26,,, | Performed by: STUDENT IN AN ORGANIZED HEALTH CARE EDUCATION/TRAINING PROGRAM

## 2023-03-20 PROCEDURE — 25000003 PHARM REV CODE 250: Performed by: INTERNAL MEDICINE

## 2023-03-20 PROCEDURE — 88341 IMHCHEM/IMCYTCHM EA ADD ANTB: CPT | Mod: 26,,, | Performed by: STUDENT IN AN ORGANIZED HEALTH CARE EDUCATION/TRAINING PROGRAM

## 2023-03-20 PROCEDURE — 88305 TISSUE EXAM BY PATHOLOGIST: CPT | Performed by: STUDENT IN AN ORGANIZED HEALTH CARE EDUCATION/TRAINING PROGRAM

## 2023-03-20 PROCEDURE — 63600175 PHARM REV CODE 636 W HCPCS: Performed by: NURSE ANESTHETIST, CERTIFIED REGISTERED

## 2023-03-20 RX ORDER — ONDANSETRON 2 MG/ML
4 INJECTION INTRAMUSCULAR; INTRAVENOUS DAILY PRN
Status: DISCONTINUED | OUTPATIENT
Start: 2023-03-20 | End: 2023-03-20 | Stop reason: HOSPADM

## 2023-03-20 RX ORDER — OXYCODONE AND ACETAMINOPHEN 5; 325 MG/1; MG/1
1 TABLET ORAL
Status: DISCONTINUED | OUTPATIENT
Start: 2023-03-20 | End: 2023-03-20 | Stop reason: HOSPADM

## 2023-03-20 RX ORDER — LIDOCAINE HYDROCHLORIDE 20 MG/ML
INJECTION INTRAVENOUS
Status: DISCONTINUED | OUTPATIENT
Start: 2023-03-20 | End: 2023-03-20

## 2023-03-20 RX ORDER — PROPOFOL 10 MG/ML
VIAL (ML) INTRAVENOUS
Status: DISCONTINUED | OUTPATIENT
Start: 2023-03-20 | End: 2023-03-20

## 2023-03-20 RX ORDER — MIDAZOLAM HYDROCHLORIDE 1 MG/ML
INJECTION, SOLUTION INTRAMUSCULAR; INTRAVENOUS
Status: DISCONTINUED | OUTPATIENT
Start: 2023-03-20 | End: 2023-03-20

## 2023-03-20 RX ORDER — HYDROMORPHONE HYDROCHLORIDE 2 MG/ML
0.2 INJECTION, SOLUTION INTRAMUSCULAR; INTRAVENOUS; SUBCUTANEOUS EVERY 5 MIN PRN
Status: DISCONTINUED | OUTPATIENT
Start: 2023-03-20 | End: 2023-03-20 | Stop reason: HOSPADM

## 2023-03-20 RX ORDER — DEXTROMETHORPHAN/PSEUDOEPHED 2.5-7.5/.8
DROPS ORAL
Status: COMPLETED | OUTPATIENT
Start: 2023-03-20 | End: 2023-03-20

## 2023-03-20 RX ORDER — SODIUM CHLORIDE 0.9 % (FLUSH) 0.9 %
10 SYRINGE (ML) INJECTION
Status: DISCONTINUED | OUTPATIENT
Start: 2023-03-20 | End: 2023-03-20 | Stop reason: HOSPADM

## 2023-03-20 RX ORDER — ONDANSETRON 2 MG/ML
INJECTION INTRAMUSCULAR; INTRAVENOUS
Status: DISCONTINUED | OUTPATIENT
Start: 2023-03-20 | End: 2023-03-20

## 2023-03-20 RX ORDER — SODIUM CHLORIDE 9 MG/ML
INJECTION, SOLUTION INTRAVENOUS CONTINUOUS
Status: DISCONTINUED | OUTPATIENT
Start: 2023-03-20 | End: 2023-03-20 | Stop reason: HOSPADM

## 2023-03-20 RX ORDER — SUCCINYLCHOLINE CHLORIDE 20 MG/ML
INJECTION INTRAMUSCULAR; INTRAVENOUS
Status: DISCONTINUED | OUTPATIENT
Start: 2023-03-20 | End: 2023-03-20

## 2023-03-20 RX ORDER — FENTANYL CITRATE 50 UG/ML
25 INJECTION, SOLUTION INTRAMUSCULAR; INTRAVENOUS EVERY 5 MIN PRN
Status: DISCONTINUED | OUTPATIENT
Start: 2023-03-20 | End: 2023-03-20 | Stop reason: HOSPADM

## 2023-03-20 RX ORDER — FENTANYL CITRATE 50 UG/ML
INJECTION, SOLUTION INTRAMUSCULAR; INTRAVENOUS
Status: DISCONTINUED | OUTPATIENT
Start: 2023-03-20 | End: 2023-03-20

## 2023-03-20 RX ORDER — ROCURONIUM BROMIDE 10 MG/ML
INJECTION, SOLUTION INTRAVENOUS
Status: DISCONTINUED | OUTPATIENT
Start: 2023-03-20 | End: 2023-03-20

## 2023-03-20 RX ORDER — PROCHLORPERAZINE EDISYLATE 5 MG/ML
5 INJECTION INTRAMUSCULAR; INTRAVENOUS EVERY 30 MIN PRN
Status: DISCONTINUED | OUTPATIENT
Start: 2023-03-20 | End: 2023-03-20 | Stop reason: HOSPADM

## 2023-03-20 RX ORDER — DEXAMETHASONE SODIUM PHOSPHATE 4 MG/ML
INJECTION, SOLUTION INTRA-ARTICULAR; INTRALESIONAL; INTRAMUSCULAR; INTRAVENOUS; SOFT TISSUE
Status: DISCONTINUED | OUTPATIENT
Start: 2023-03-20 | End: 2023-03-20

## 2023-03-20 RX ADMIN — DEXAMETHASONE SODIUM PHOSPHATE 4 MG: 4 INJECTION, SOLUTION INTRAMUSCULAR; INTRAVENOUS at 09:03

## 2023-03-20 RX ADMIN — ROCURONIUM BROMIDE 5 MG: 10 INJECTION, SOLUTION INTRAVENOUS at 09:03

## 2023-03-20 RX ADMIN — PROPOFOL 150 MG: 10 INJECTION, EMULSION INTRAVENOUS at 09:03

## 2023-03-20 RX ADMIN — ONDANSETRON 4 MG: 2 INJECTION, SOLUTION INTRAMUSCULAR; INTRAVENOUS at 09:03

## 2023-03-20 RX ADMIN — LIDOCAINE HYDROCHLORIDE 100 MG: 20 INJECTION, SOLUTION INTRAVENOUS at 09:03

## 2023-03-20 RX ADMIN — SUCCINYLCHOLINE CHLORIDE 120 MG: 20 INJECTION, SOLUTION INTRAMUSCULAR; INTRAVENOUS at 09:03

## 2023-03-20 RX ADMIN — FENTANYL CITRATE 100 MCG: 50 INJECTION, SOLUTION INTRAMUSCULAR; INTRAVENOUS at 09:03

## 2023-03-20 RX ADMIN — MIDAZOLAM 2 MG: 1 INJECTION INTRAMUSCULAR; INTRAVENOUS at 09:03

## 2023-03-20 RX ADMIN — SODIUM CHLORIDE: 0.9 INJECTION, SOLUTION INTRAVENOUS at 07:03

## 2023-03-20 NOTE — ANESTHESIA POSTPROCEDURE EVALUATION
Anesthesia Post Evaluation    Patient: Poli Young    Procedure(s) Performed: Procedure(s) (LRB):  ENTEROSCOPY Upper DBE (N/A)    Final Anesthesia Type: general      Patient location during evaluation: PACU  Patient participation: Yes- Able to Participate  Level of consciousness: awake and alert  Post-procedure vital signs: reviewed and stable  Pain management: adequate  Airway patency: patent    PONV status at discharge: No PONV  Anesthetic complications: no      Cardiovascular status: stable  Respiratory status: spontaneous ventilation  Hydration status: euvolemic  Follow-up not needed.          Vitals Value Taken Time   /53 03/20/23 1038   Temp 36.5 °C (97.7 °F) 03/20/23 1038   Pulse 66 03/20/23 1038   Resp 20 03/20/23 1038   SpO2 99 % 03/20/23 1038         Event Time   Out of Recovery 10:34:00         Pain/Gui Score: No data recorded

## 2023-03-20 NOTE — TRANSFER OF CARE
Anesthesia Transfer of Care Note    Patient: Poli Young    Procedure(s) Performed: Procedure(s) (LRB):  ENTEROSCOPY Upper DBE (N/A)    Patient location: PACU    Anesthesia Type: general    Transport from OR: Transported from OR on 6-10 L/min O2 by face mask with adequate spontaneous ventilation    Post pain: adequate analgesia    Post assessment: no apparent anesthetic complications    Post vital signs: stable    Level of consciousness: awake    Nausea/Vomiting: no nausea/vomiting    Complications: none    Transfer of care protocol was followed      Last vitals:   Visit Vitals  /70 (BP Location: Left arm, Patient Position: Lying)   Pulse 89   Temp 36.5 °C (97.7 °F) (Skin)   Resp 14   Ht 6' (1.829 m)   Wt 90.3 kg (199 lb)   SpO2 100%   BMI 26.99 kg/m²

## 2023-03-20 NOTE — ANESTHESIA PREPROCEDURE EVALUATION
03/20/2023  Poli Young is a 67 y.o., male.      Pre-op Assessment    I have reviewed the Patient Summary Reports.     I have reviewed the Nursing Notes. I have reviewed the NPO Status.   I have reviewed the Medications.     Review of Systems  Anesthesia Hx:  Denies Family Hx of Anesthesia complications.    Social:  Non-Smoker    Hematology/Oncology:     Oncology Normal    -- Anemia: Hematology Comments: Acute gi bleed    EENT/Dental:EENT/Dental Normal   Cardiovascular:   Hypertension  Denies Angina. hyperlipidemia  Denies Cardiovascular Symptoms.  Denies Coronary Artery Disease.  Denies Valvular Heart Disease. Denies Congenital Heart Disease.    Denies Congenital Heart Disease.   Denies Deep Venous Thrombosis (DVT)  Hypertension   Denies Disorder of Cardiac Rhythm       Pulmonary:  Pulmonary Normal  Denies Pulmonary Symptoms.  Denies Asthma.  Denies Chronic Obstructive Pulmonary Disease (COPD).  Denies Obstructive Sleep Apnea (DEMETRIO) Denies Possible Obstructive Sleep Apnea      Denies Pulmonary Infection.    Renal/:  Renal/ Normal   Denies Renal Symptoms/Infections/Stones    Hepatic/GI:  Hepatic/GI Normal  Hepatic/GI Symptoms: melena.  Denies Liver Disease    Musculoskeletal:  Musculoskeletal Normal  Denies Musculoskeletal General/Symptoms  Denies Rheumatic Disease  Denies Cervical Spine Disorder    Neurological:  Neurology Normal  Denies Dx of Headaches Denies Seizure Disorder  Denies CVA - Cerebrovasular Accident  Denies TIA - Transient Ischemic Attack  Denies Movement Disorder Dx Denies Neuromuscular Disease   Endocrine:  Endocrine Normal  Denies Diabetes  Denies Thyroid Disease    Dermatological:  Skin Normal    Psych:  Psychiatric Normal           Physical Exam  General: Well nourished, Cooperative, Alert and Oriented    Airway:  Mallampati: I   Mouth Opening: Normal  TM Distance: Normal  Tongue:  Normal  Neck ROM: Normal ROM    Chest/Lungs:  Clear to auscultation, Normal Respiratory Rate    Heart:  Rate: Normal  Rhythm: Regular Rhythm        Anesthesia Plan  Type of Anesthesia, risks & benefits discussed:    Anesthesia Type: Gen ETT  Intra-op Monitoring Plan: Standard ASA Monitors  Post Op Pain Control Plan: multimodal analgesia  Induction:  IV  Airway Plan: Direct  Informed Consent: Informed consent signed with the Patient and all parties understand the risks and agree with anesthesia plan.  All questions answered. Patient consented to blood products? Yes  ASA Score: 2  Day of Surgery Review of History & Physical: H&P Update referred to the surgeon/provider.    Ready For Surgery From Anesthesia Perspective.     .

## 2023-03-20 NOTE — PROVATION PATIENT INSTRUCTIONS
Discharge Summary/Instructions after an Endoscopic Procedure  Patient Name: Poli Young  Patient MRN: [id number]  Patient YOB: 1955 Monday, March 20, 2023  Giancarlo Cruz MD  Dear patient,  As a result of recent federal legislation (The Federal Cures Act), you may   receive lab or pathology results from your procedure in your [facility]   account before your physician is able to contact you. Your physician or   their representative will relay the results to you with their   recommendations at their soonest availability.  Thank you,  Your health is very important to us during the Covid Crisis. Following your   procedure today, you will receive a daily text for 2 weeks asking about   signs or symptoms of Covid 19.  Please respond to this text when you   receive it so we can follow up and keep you as safe as possible.   RESTRICTIONS:  During your procedure today, you received medications for sedation.  These   medications may affect your judgment, balance and coordination.  Therefore,   for 24 hours, you have the following restrictions:   - DO NOT drive a car, operate machinery, make legal/financial decisions,   sign important papers or drink alcohol.    ACTIVITY:  Today: no heavy lifting, straining or running due to procedural   sedation/anesthesia.  The following day: return to full activity including work.  DIET:  Eat and drink normally unless instructed otherwise.     TREATMENT FOR COMMON SIDE EFFECTS:  - Mild abdominal pain, nausea, belching, bloating or excessive gas:  rest,   eat lightly and use a heating pad.  - Sore Throat: treat with throat lozenges and/or gargle with warm salt   water.  - Because air was used during the procedure, expelling large amounts of air   from your rectum or belching is normal.  - If a bowel prep was taken, you may not have a bowel movement for 1-3 days.    This is normal.  SYMPTOMS TO WATCH FOR AND REPORT TO YOUR PHYSICIAN:  1. Abdominal pain or bloating, other than  gas cramps.  2. Chest pain.  3. Back pain.  4. Signs of infection such as: chills or fever occurring within 24 hours   after the procedure.  5. Rectal bleeding, which would show as bright red, maroon, or black stools.   (A tablespoon of blood from the rectum is not serious, especially if   hemorrhoids are present.)  6. Vomiting.  7. Weakness or dizziness.  GO DIRECTLY TO THE NEAREST EMERGENCY ROOM IF YOU HAVE ANY OF THE FOLLOWING:      Difficulty breathing              Chills and/or fever over 101 F   Persistent vomiting and/or vomiting blood   Severe abdominal pain   Severe chest pain   Black, tarry stools   Bleeding- more than one tablespoon   Any other symptom or condition that you feel may need urgent attention  Your doctor recommends these additional instructions:  If any biopsies were taken, your doctors clinic will contact you in 1 to 2   weeks with any results.  - Follow up path results   - Surgical referral for resection which is commonly curative   - Discharge to home  - Resume previous diet and medications  - Condition stable   - The signs and symptoms of potential delayed complications were discussed   with the patient. If signs or symptoms of these complications develop, call   the [facility] at [phone].   - Return to normal activities tomorrow.  Written discharge instructions were   provided to the patient.  For questions, problems or results please call your physician - Giancarlo Cruz MD.  EMERGENCY PHONE NUMBER: [phone],  LAB RESULTS: [phone]  IF A COMPLICATION OR EMERGENCY SITUATION ARISES AND YOU ARE UNABLE TO REACH   YOUR PHYSICIAN - GO DIRECTLY TO THE EMERGENCY ROOM.  MD Giancarlo Alejo MD  3/20/2023 11:19:35 AM  This report has been verified and signed electronically.  Dear patient,  As a result of recent federal legislation (The Federal Cures Act), you may   receive lab or pathology results from your procedure in your [facility]   account  before your physician is able to contact you. Your physician or   their representative will relay the results to you with their   recommendations at their soonest availability.  Thank you,  PROVATION

## 2023-03-20 NOTE — ANESTHESIA PROCEDURE NOTES
Intubation    Date/Time: 3/20/2023 9:18 AM  Performed by: Natalie Collier CRNA  Authorized by: Darrius Rainey MD     Intubation:     Induction:  Intravenous    Intubated:  Postinduction    Mask Ventilation:  Easy mask    Attempts:  1    Attempted By:  CRNA    Method of Intubation:  Video laryngoscopy    Blade:  Worley 3    Laryngeal View Grade: Grade I - full view of cords      Difficult Airway Encountered?: No      Complications:  None    Airway Device:  Oral endotracheal tube    Airway Device Size:  7.5    Style/Cuff Inflation:  Cuffed (inflated to minimal occlusive pressure)    Inflation Amount (mL):  6    Tube secured:  21    Secured at:  The lips    Placement Verified By:  Capnometry    Complicating Factors:  None    Findings Post-Intubation:  BS equal bilateral and atraumatic/condition of teeth unchanged

## 2023-03-27 LAB
COMMENT: NORMAL
FINAL PATHOLOGIC DIAGNOSIS: NORMAL
GROSS: NORMAL
Lab: NORMAL
SUPPLEMENTAL DIAGNOSIS: NORMAL

## 2023-03-31 ENCOUNTER — OFFICE VISIT (OUTPATIENT)
Dept: SURGERY | Facility: CLINIC | Age: 68
End: 2023-03-31
Payer: MEDICARE

## 2023-03-31 ENCOUNTER — LAB VISIT (OUTPATIENT)
Dept: LAB | Facility: HOSPITAL | Age: 68
End: 2023-03-31
Attending: STUDENT IN AN ORGANIZED HEALTH CARE EDUCATION/TRAINING PROGRAM
Payer: MEDICARE

## 2023-03-31 VITALS
BODY MASS INDEX: 25.83 KG/M2 | WEIGHT: 194.88 LBS | SYSTOLIC BLOOD PRESSURE: 148 MMHG | HEART RATE: 75 BPM | HEIGHT: 73 IN | DIASTOLIC BLOOD PRESSURE: 77 MMHG

## 2023-03-31 DIAGNOSIS — K92.2 ACUTE UPPER GASTROINTESTINAL HEMORRHAGE: ICD-10-CM

## 2023-03-31 DIAGNOSIS — K63.89 SMALL BOWEL MASS: ICD-10-CM

## 2023-03-31 DIAGNOSIS — K92.2 ACUTE UPPER GASTROINTESTINAL HEMORRHAGE: Primary | ICD-10-CM

## 2023-03-31 LAB
BASOPHILS # BLD AUTO: 0.02 K/UL (ref 0–0.2)
BASOPHILS NFR BLD: 0.4 % (ref 0–1.9)
DIFFERENTIAL METHOD: ABNORMAL
EOSINOPHIL # BLD AUTO: 0.3 K/UL (ref 0–0.5)
EOSINOPHIL NFR BLD: 5.4 % (ref 0–8)
ERYTHROCYTE [DISTWIDTH] IN BLOOD BY AUTOMATED COUNT: 15.7 % (ref 11.5–14.5)
HCT VFR BLD AUTO: 32.7 % (ref 40–54)
HGB BLD-MCNC: 10.1 G/DL (ref 14–18)
IMM GRANULOCYTES # BLD AUTO: 0.01 K/UL (ref 0–0.04)
IMM GRANULOCYTES NFR BLD AUTO: 0.2 % (ref 0–0.5)
LYMPHOCYTES # BLD AUTO: 1.4 K/UL (ref 1–4.8)
LYMPHOCYTES NFR BLD: 25.9 % (ref 18–48)
MCH RBC QN AUTO: 26.5 PG (ref 27–31)
MCHC RBC AUTO-ENTMCNC: 30.9 G/DL (ref 32–36)
MCV RBC AUTO: 86 FL (ref 82–98)
MONOCYTES # BLD AUTO: 0.5 K/UL (ref 0.3–1)
MONOCYTES NFR BLD: 8.3 % (ref 4–15)
NEUTROPHILS # BLD AUTO: 3.3 K/UL (ref 1.8–7.7)
NEUTROPHILS NFR BLD: 59.8 % (ref 38–73)
NRBC BLD-RTO: 0 /100 WBC
PLATELET # BLD AUTO: 312 K/UL (ref 150–450)
PMV BLD AUTO: 8.4 FL (ref 9.2–12.9)
RBC # BLD AUTO: 3.81 M/UL (ref 4.6–6.2)
WBC # BLD AUTO: 5.52 K/UL (ref 3.9–12.7)

## 2023-03-31 PROCEDURE — 3077F PR MOST RECENT SYSTOLIC BLOOD PRESSURE >= 140 MM HG: ICD-10-PCS | Mod: CPTII,S$GLB,, | Performed by: STUDENT IN AN ORGANIZED HEALTH CARE EDUCATION/TRAINING PROGRAM

## 2023-03-31 PROCEDURE — 1125F PR PAIN SEVERITY QUANTIFIED, PAIN PRESENT: ICD-10-PCS | Mod: CPTII,S$GLB,, | Performed by: STUDENT IN AN ORGANIZED HEALTH CARE EDUCATION/TRAINING PROGRAM

## 2023-03-31 PROCEDURE — 1159F PR MEDICATION LIST DOCUMENTED IN MEDICAL RECORD: ICD-10-PCS | Mod: CPTII,S$GLB,, | Performed by: STUDENT IN AN ORGANIZED HEALTH CARE EDUCATION/TRAINING PROGRAM

## 2023-03-31 PROCEDURE — 3288F PR FALLS RISK ASSESSMENT DOCUMENTED: ICD-10-PCS | Mod: CPTII,S$GLB,, | Performed by: STUDENT IN AN ORGANIZED HEALTH CARE EDUCATION/TRAINING PROGRAM

## 2023-03-31 PROCEDURE — 85025 COMPLETE CBC W/AUTO DIFF WBC: CPT | Performed by: STUDENT IN AN ORGANIZED HEALTH CARE EDUCATION/TRAINING PROGRAM

## 2023-03-31 PROCEDURE — 3077F SYST BP >= 140 MM HG: CPT | Mod: CPTII,S$GLB,, | Performed by: STUDENT IN AN ORGANIZED HEALTH CARE EDUCATION/TRAINING PROGRAM

## 2023-03-31 PROCEDURE — 3078F PR MOST RECENT DIASTOLIC BLOOD PRESSURE < 80 MM HG: ICD-10-PCS | Mod: CPTII,S$GLB,, | Performed by: STUDENT IN AN ORGANIZED HEALTH CARE EDUCATION/TRAINING PROGRAM

## 2023-03-31 PROCEDURE — 1101F PR PT FALLS ASSESS DOC 0-1 FALLS W/OUT INJ PAST YR: ICD-10-PCS | Mod: CPTII,S$GLB,, | Performed by: STUDENT IN AN ORGANIZED HEALTH CARE EDUCATION/TRAINING PROGRAM

## 2023-03-31 PROCEDURE — 1101F PT FALLS ASSESS-DOCD LE1/YR: CPT | Mod: CPTII,S$GLB,, | Performed by: STUDENT IN AN ORGANIZED HEALTH CARE EDUCATION/TRAINING PROGRAM

## 2023-03-31 PROCEDURE — 3008F BODY MASS INDEX DOCD: CPT | Mod: CPTII,S$GLB,, | Performed by: STUDENT IN AN ORGANIZED HEALTH CARE EDUCATION/TRAINING PROGRAM

## 2023-03-31 PROCEDURE — 36415 COLL VENOUS BLD VENIPUNCTURE: CPT | Performed by: STUDENT IN AN ORGANIZED HEALTH CARE EDUCATION/TRAINING PROGRAM

## 2023-03-31 PROCEDURE — 1160F PR REVIEW ALL MEDS BY PRESCRIBER/CLIN PHARMACIST DOCUMENTED: ICD-10-PCS | Mod: CPTII,S$GLB,, | Performed by: STUDENT IN AN ORGANIZED HEALTH CARE EDUCATION/TRAINING PROGRAM

## 2023-03-31 PROCEDURE — 99999 PR PBB SHADOW E&M-EST. PATIENT-LVL IV: CPT | Mod: PBBFAC,,, | Performed by: STUDENT IN AN ORGANIZED HEALTH CARE EDUCATION/TRAINING PROGRAM

## 2023-03-31 PROCEDURE — 99204 PR OFFICE/OUTPT VISIT, NEW, LEVL IV, 45-59 MIN: ICD-10-PCS | Mod: S$GLB,,, | Performed by: STUDENT IN AN ORGANIZED HEALTH CARE EDUCATION/TRAINING PROGRAM

## 2023-03-31 PROCEDURE — 3008F PR BODY MASS INDEX (BMI) DOCUMENTED: ICD-10-PCS | Mod: CPTII,S$GLB,, | Performed by: STUDENT IN AN ORGANIZED HEALTH CARE EDUCATION/TRAINING PROGRAM

## 2023-03-31 PROCEDURE — 4010F ACE/ARB THERAPY RXD/TAKEN: CPT | Mod: CPTII,S$GLB,, | Performed by: STUDENT IN AN ORGANIZED HEALTH CARE EDUCATION/TRAINING PROGRAM

## 2023-03-31 PROCEDURE — 3078F DIAST BP <80 MM HG: CPT | Mod: CPTII,S$GLB,, | Performed by: STUDENT IN AN ORGANIZED HEALTH CARE EDUCATION/TRAINING PROGRAM

## 2023-03-31 PROCEDURE — 99999 PR PBB SHADOW E&M-EST. PATIENT-LVL IV: ICD-10-PCS | Mod: PBBFAC,,, | Performed by: STUDENT IN AN ORGANIZED HEALTH CARE EDUCATION/TRAINING PROGRAM

## 2023-03-31 PROCEDURE — 4010F PR ACE/ARB THEARPY RXD/TAKEN: ICD-10-PCS | Mod: CPTII,S$GLB,, | Performed by: STUDENT IN AN ORGANIZED HEALTH CARE EDUCATION/TRAINING PROGRAM

## 2023-03-31 PROCEDURE — 1159F MED LIST DOCD IN RCRD: CPT | Mod: CPTII,S$GLB,, | Performed by: STUDENT IN AN ORGANIZED HEALTH CARE EDUCATION/TRAINING PROGRAM

## 2023-03-31 PROCEDURE — 99204 OFFICE O/P NEW MOD 45 MIN: CPT | Mod: S$GLB,,, | Performed by: STUDENT IN AN ORGANIZED HEALTH CARE EDUCATION/TRAINING PROGRAM

## 2023-03-31 PROCEDURE — 1160F RVW MEDS BY RX/DR IN RCRD: CPT | Mod: CPTII,S$GLB,, | Performed by: STUDENT IN AN ORGANIZED HEALTH CARE EDUCATION/TRAINING PROGRAM

## 2023-03-31 PROCEDURE — 1125F AMNT PAIN NOTED PAIN PRSNT: CPT | Mod: CPTII,S$GLB,, | Performed by: STUDENT IN AN ORGANIZED HEALTH CARE EDUCATION/TRAINING PROGRAM

## 2023-03-31 PROCEDURE — 3288F FALL RISK ASSESSMENT DOCD: CPT | Mod: CPTII,S$GLB,, | Performed by: STUDENT IN AN ORGANIZED HEALTH CARE EDUCATION/TRAINING PROGRAM

## 2023-03-31 NOTE — PROGRESS NOTES
Patient ID: Poli Young is a 67 y.o. male.    Chief Complaint: No chief complaint on file.      HPI:  HPI  67M underwent work up for anemia and was found to have a small bowel mass about 100cm from IC valve by enteroscopy by Dr Cruz. Got one blood transfusion.   He reports history of left central intermittent abdominal pain for several months. No NV. Was found to be anemic on bloodwork. EGD in June 2022 did not show any obvious cause of bleeding.  Capsule endoscopy in January showed blood in small bowel. Enteroscopy showed mass in March that was biopsied and tattooed. Biopsy positive for adenocarcinoma.   Retired . Never smoked. Drinks etoh.   Denies dizziness, weakness now like he had before.   Never had surgery. No CP, SOB, MI.     Review of Systems   Constitutional:  Negative for chills, diaphoresis and fever.   HENT:  Negative for trouble swallowing.    Respiratory:  Negative for cough, shortness of breath, wheezing and stridor.    Cardiovascular:  Negative for chest pain and palpitations.   Gastrointestinal:  Positive for abdominal pain. Negative for abdominal distention, blood in stool, diarrhea, nausea and vomiting.   Endocrine: Negative for cold intolerance and heat intolerance.   Genitourinary:  Negative for difficulty urinating.   Musculoskeletal:  Negative for back pain.   Skin:  Negative for rash.   Allergic/Immunologic: Negative for immunocompromised state.   Neurological:  Negative for dizziness, syncope and numbness.   Hematological:  Negative for adenopathy.   Psychiatric/Behavioral:  Negative for agitation.      Current Outpatient Medications   Medication Sig Dispense Refill    amLODIPine (NORVASC) 10 MG tablet Take 10 mg by mouth once daily.      aspirin (ECOTRIN) 81 MG EC tablet Take 81 mg by mouth.      atorvastatin (LIPITOR) 10 MG tablet Take 10 mg by mouth once daily.      fluticasone propionate (FLONASE) 50 mcg/actuation nasal spray 1 spray by Each Nostril route once  daily.      krill-om-3-dha-epa-phospho-ast 1,000-230-60 mg Cap MegaRed Omega-3 Krill Oil 1,000 mg-230 mg-60 mg capsule   Take 1 capsule by oral route.      lisinopriL (PRINIVIL,ZESTRIL) 2.5 MG tablet Take 2.5 mg by mouth once daily.      pantoprazole (PROTONIX) 40 MG tablet Take 1 tablet (40 mg total) by mouth once daily. 90 tablet 3    sildenafiL (VIAGRA) 50 MG tablet Take 50 mg by mouth daily as needed.       No current facility-administered medications for this visit.       Review of patient's allergies indicates:  No Known Allergies    Past Medical History:   Diagnosis Date    Hyperlipemia     Hypertension        Past Surgical History:   Procedure Laterality Date    ESOPHAGOGASTRODUODENOSCOPY N/A 6/8/2022    Procedure: EGD (ESOPHAGOGASTRODUODENOSCOPY);  Surgeon: Aamir Rushing MD;  Location: [facility];  Service: Endoscopy;  Laterality: N/A;    INTRALUMINAL GASTROINTESTINAL TRACT IMAGING VIA CAPSULE N/A 1/20/2023    Procedure: IMAGING PROCEDURE, GI TRACT, INTRALUMINAL, VIA CAPSULE;  Surgeon: Aamir Rushing MD;  Location: [facility];  Service: Endoscopy;  Laterality: N/A;    SMALL BOWEL ENTEROSCOPY N/A 3/20/2023    Procedure: ENTEROSCOPY Upper DBE;  Surgeon: Giancarlo Cruz MD;  Location: [facility];  Service: Endoscopy;  Laterality: N/A;       No family history on file.    Social History     Socioeconomic History    Marital status: Single   Tobacco Use    Smoking status: Never    Smokeless tobacco: Never   Substance and Sexual Activity    Alcohol use: Yes     Comment: socially    Drug use: Never       Vitals:    03/31/23 1032   BP: (!) 148/77   Pulse: 75       Physical Exam  Constitutional:       General: He is not in acute distress.  HENT:      Head: Normocephalic and atraumatic.   Eyes:      General: No scleral icterus.  Cardiovascular:      Rate and Rhythm: Normal rate.   Pulmonary:      Effort: Pulmonary effort is normal. No respiratory distress.      Breath sounds: No stridor.   Abdominal:       Palpations: Abdomen is soft.      Tenderness: There is no abdominal tenderness.      Hernia: A hernia is present.      Comments: 1cm UH   Lymphadenopathy:      Cervical: No cervical adenopathy.   Skin:     General: Skin is warm.      Findings: No erythema.   Neurological:      Mental Status: He is alert and oriented to person, place, and time.   Psychiatric:         Behavior: Behavior normal.   Body mass index is 25.71 kg/m².  Small bowel mass noted on enteroscopy, tattooed, +adenco  CT shows liver cysts, no obvious metastatic disease  No CBC since last year    Assessment & Plan:  67M with small bowel adenocarcinoma  Recheck CBC  Plan for small bowel resection Tuesday April 4  Hospital admit

## 2023-03-31 NOTE — H&P (VIEW-ONLY)
Patient ID: Poli Young is a 67 y.o. male.    Chief Complaint: No chief complaint on file.      HPI:  HPI  67M underwent work up for anemia and was found to have a small bowel mass about 100cm from IC valve by enteroscopy by Dr Cruz. Got one blood transfusion.   He reports history of left central intermittent abdominal pain for several months. No NV. Was found to be anemic on bloodwork. EGD in June 2022 did not show any obvious cause of bleeding.  Capsule endoscopy in January showed blood in small bowel. Enteroscopy showed mass in March that was biopsied and tattooed. Biopsy positive for adenocarcinoma.   Retired . Never smoked. Drinks etoh.   Denies dizziness, weakness now like he had before.   Never had surgery. No CP, SOB, MI.     Review of Systems   Constitutional:  Negative for chills, diaphoresis and fever.   HENT:  Negative for trouble swallowing.    Respiratory:  Negative for cough, shortness of breath, wheezing and stridor.    Cardiovascular:  Negative for chest pain and palpitations.   Gastrointestinal:  Positive for abdominal pain. Negative for abdominal distention, blood in stool, diarrhea, nausea and vomiting.   Endocrine: Negative for cold intolerance and heat intolerance.   Genitourinary:  Negative for difficulty urinating.   Musculoskeletal:  Negative for back pain.   Skin:  Negative for rash.   Allergic/Immunologic: Negative for immunocompromised state.   Neurological:  Negative for dizziness, syncope and numbness.   Hematological:  Negative for adenopathy.   Psychiatric/Behavioral:  Negative for agitation.      Current Outpatient Medications   Medication Sig Dispense Refill    amLODIPine (NORVASC) 10 MG tablet Take 10 mg by mouth once daily.      aspirin (ECOTRIN) 81 MG EC tablet Take 81 mg by mouth.      atorvastatin (LIPITOR) 10 MG tablet Take 10 mg by mouth once daily.      fluticasone propionate (FLONASE) 50 mcg/actuation nasal spray 1 spray by Each Nostril route once  daily.      krill-om-3-dha-epa-phospho-ast 1,000-230-60 mg Cap MegaRed Omega-3 Krill Oil 1,000 mg-230 mg-60 mg capsule   Take 1 capsule by oral route.      lisinopriL (PRINIVIL,ZESTRIL) 2.5 MG tablet Take 2.5 mg by mouth once daily.      pantoprazole (PROTONIX) 40 MG tablet Take 1 tablet (40 mg total) by mouth once daily. 90 tablet 3    sildenafiL (VIAGRA) 50 MG tablet Take 50 mg by mouth daily as needed.       No current facility-administered medications for this visit.       Review of patient's allergies indicates:  No Known Allergies    Past Medical History:   Diagnosis Date    Hyperlipemia     Hypertension        Past Surgical History:   Procedure Laterality Date    ESOPHAGOGASTRODUODENOSCOPY N/A 6/8/2022    Procedure: EGD (ESOPHAGOGASTRODUODENOSCOPY);  Surgeon: Aamir Rushing MD;  Location: [facility];  Service: Endoscopy;  Laterality: N/A;    INTRALUMINAL GASTROINTESTINAL TRACT IMAGING VIA CAPSULE N/A 1/20/2023    Procedure: IMAGING PROCEDURE, GI TRACT, INTRALUMINAL, VIA CAPSULE;  Surgeon: Aamir Rushing MD;  Location: [facility];  Service: Endoscopy;  Laterality: N/A;    SMALL BOWEL ENTEROSCOPY N/A 3/20/2023    Procedure: ENTEROSCOPY Upper DBE;  Surgeon: Giancarlo Cruz MD;  Location: [facility];  Service: Endoscopy;  Laterality: N/A;       No family history on file.    Social History     Socioeconomic History    Marital status: Single   Tobacco Use    Smoking status: Never    Smokeless tobacco: Never   Substance and Sexual Activity    Alcohol use: Yes     Comment: socially    Drug use: Never       Vitals:    03/31/23 1032   BP: (!) 148/77   Pulse: 75       Physical Exam  Constitutional:       General: He is not in acute distress.  HENT:      Head: Normocephalic and atraumatic.   Eyes:      General: No scleral icterus.  Cardiovascular:      Rate and Rhythm: Normal rate.   Pulmonary:      Effort: Pulmonary effort is normal. No respiratory distress.      Breath sounds: No stridor.   Abdominal:       Palpations: Abdomen is soft.      Tenderness: There is no abdominal tenderness.      Hernia: A hernia is present.      Comments: 1cm UH   Lymphadenopathy:      Cervical: No cervical adenopathy.   Skin:     General: Skin is warm.      Findings: No erythema.   Neurological:      Mental Status: He is alert and oriented to person, place, and time.   Psychiatric:         Behavior: Behavior normal.   Body mass index is 25.71 kg/m².  Small bowel mass noted on enteroscopy, tattooed, +adenco  CT shows liver cysts, no obvious metastatic disease  No CBC since last year    Assessment & Plan:  67M with small bowel adenocarcinoma  Recheck CBC  Plan for small bowel resection Tuesday April 4  Hospital admit

## 2023-04-04 ENCOUNTER — HOSPITAL ENCOUNTER (INPATIENT)
Facility: HOSPITAL | Age: 68
LOS: 7 days | Discharge: HOME | DRG: 330 | End: 2023-04-11
Attending: STUDENT IN AN ORGANIZED HEALTH CARE EDUCATION/TRAINING PROGRAM | Admitting: STUDENT IN AN ORGANIZED HEALTH CARE EDUCATION/TRAINING PROGRAM
Payer: MEDICARE

## 2023-04-04 ENCOUNTER — ANESTHESIA EVENT (OUTPATIENT)
Dept: SURGERY | Facility: HOSPITAL | Age: 68
DRG: 330 | End: 2023-04-04
Payer: MEDICARE

## 2023-04-04 ENCOUNTER — ANESTHESIA (OUTPATIENT)
Dept: SURGERY | Facility: HOSPITAL | Age: 68
DRG: 330 | End: 2023-04-04
Payer: MEDICARE

## 2023-04-04 DIAGNOSIS — K92.2 ACUTE UPPER GASTROINTESTINAL HEMORRHAGE: ICD-10-CM

## 2023-04-04 DIAGNOSIS — K63.89 SMALL BOWEL MASS: Primary | ICD-10-CM

## 2023-04-04 PROCEDURE — 44120 REMOVAL OF SMALL INTESTINE: CPT | Mod: ,,, | Performed by: STUDENT IN AN ORGANIZED HEALTH CARE EDUCATION/TRAINING PROGRAM

## 2023-04-04 PROCEDURE — D9220A PRA ANESTHESIA: ICD-10-PCS | Mod: ANES,,, | Performed by: ANESTHESIOLOGY

## 2023-04-04 PROCEDURE — 44120 PR RESECT SMALL INTEST,SINGL RESEC/ANAS: ICD-10-PCS | Mod: ,,, | Performed by: STUDENT IN AN ORGANIZED HEALTH CARE EDUCATION/TRAINING PROGRAM

## 2023-04-04 PROCEDURE — 88342 CHG IMMUNOCYTOCHEMISTRY: ICD-10-PCS | Mod: 26,,, | Performed by: STUDENT IN AN ORGANIZED HEALTH CARE EDUCATION/TRAINING PROGRAM

## 2023-04-04 PROCEDURE — 36000708 HC OR TIME LEV III 1ST 15 MIN: Performed by: STUDENT IN AN ORGANIZED HEALTH CARE EDUCATION/TRAINING PROGRAM

## 2023-04-04 PROCEDURE — 63600175 PHARM REV CODE 636 W HCPCS: Performed by: STUDENT IN AN ORGANIZED HEALTH CARE EDUCATION/TRAINING PROGRAM

## 2023-04-04 PROCEDURE — 88342 IMHCHEM/IMCYTCHM 1ST ANTB: CPT | Mod: 26,,, | Performed by: STUDENT IN AN ORGANIZED HEALTH CARE EDUCATION/TRAINING PROGRAM

## 2023-04-04 PROCEDURE — 25000003 PHARM REV CODE 250: Performed by: STUDENT IN AN ORGANIZED HEALTH CARE EDUCATION/TRAINING PROGRAM

## 2023-04-04 PROCEDURE — 25000003 PHARM REV CODE 250: Performed by: NURSE ANESTHETIST, CERTIFIED REGISTERED

## 2023-04-04 PROCEDURE — 27201423 OPTIME MED/SURG SUP & DEVICES STERILE SUPPLY: Performed by: STUDENT IN AN ORGANIZED HEALTH CARE EDUCATION/TRAINING PROGRAM

## 2023-04-04 PROCEDURE — 63600175 PHARM REV CODE 636 W HCPCS: Performed by: ANESTHESIOLOGY

## 2023-04-04 PROCEDURE — 88307 TISSUE EXAM BY PATHOLOGIST: CPT | Performed by: STUDENT IN AN ORGANIZED HEALTH CARE EDUCATION/TRAINING PROGRAM

## 2023-04-04 PROCEDURE — 88342 IMHCHEM/IMCYTCHM 1ST ANTB: CPT | Performed by: STUDENT IN AN ORGANIZED HEALTH CARE EDUCATION/TRAINING PROGRAM

## 2023-04-04 PROCEDURE — D9220A PRA ANESTHESIA: Mod: CRNA,,, | Performed by: NURSE ANESTHETIST, CERTIFIED REGISTERED

## 2023-04-04 PROCEDURE — 94761 N-INVAS EAR/PLS OXIMETRY MLT: CPT

## 2023-04-04 PROCEDURE — 63600175 PHARM REV CODE 636 W HCPCS: Performed by: NURSE ANESTHETIST, CERTIFIED REGISTERED

## 2023-04-04 PROCEDURE — 37000008 HC ANESTHESIA 1ST 15 MINUTES: Performed by: STUDENT IN AN ORGANIZED HEALTH CARE EDUCATION/TRAINING PROGRAM

## 2023-04-04 PROCEDURE — 71000039 HC RECOVERY, EACH ADD'L HOUR: Performed by: STUDENT IN AN ORGANIZED HEALTH CARE EDUCATION/TRAINING PROGRAM

## 2023-04-04 PROCEDURE — 88307 TISSUE EXAM BY PATHOLOGIST: CPT | Mod: 26,,, | Performed by: STUDENT IN AN ORGANIZED HEALTH CARE EDUCATION/TRAINING PROGRAM

## 2023-04-04 PROCEDURE — 88341 IMHCHEM/IMCYTCHM EA ADD ANTB: CPT | Mod: 26,,, | Performed by: STUDENT IN AN ORGANIZED HEALTH CARE EDUCATION/TRAINING PROGRAM

## 2023-04-04 PROCEDURE — 36000709 HC OR TIME LEV III EA ADD 15 MIN: Performed by: STUDENT IN AN ORGANIZED HEALTH CARE EDUCATION/TRAINING PROGRAM

## 2023-04-04 PROCEDURE — 88341 PR IHC OR ICC EACH ADD'L SINGLE ANTIBODY  STAINPR: ICD-10-PCS | Mod: 26,,, | Performed by: STUDENT IN AN ORGANIZED HEALTH CARE EDUCATION/TRAINING PROGRAM

## 2023-04-04 PROCEDURE — 88341 IMHCHEM/IMCYTCHM EA ADD ANTB: CPT | Performed by: STUDENT IN AN ORGANIZED HEALTH CARE EDUCATION/TRAINING PROGRAM

## 2023-04-04 PROCEDURE — 71000033 HC RECOVERY, INTIAL HOUR: Performed by: STUDENT IN AN ORGANIZED HEALTH CARE EDUCATION/TRAINING PROGRAM

## 2023-04-04 PROCEDURE — 11000001 HC ACUTE MED/SURG PRIVATE ROOM

## 2023-04-04 PROCEDURE — 25000003 PHARM REV CODE 250: Performed by: ANESTHESIOLOGY

## 2023-04-04 PROCEDURE — D9220A PRA ANESTHESIA: Mod: ANES,,, | Performed by: ANESTHESIOLOGY

## 2023-04-04 PROCEDURE — C9290 INJ, BUPIVACAINE LIPOSOME: HCPCS | Performed by: STUDENT IN AN ORGANIZED HEALTH CARE EDUCATION/TRAINING PROGRAM

## 2023-04-04 PROCEDURE — 88307 PR  SURG PATH,LEVEL V: ICD-10-PCS | Mod: 26,,, | Performed by: STUDENT IN AN ORGANIZED HEALTH CARE EDUCATION/TRAINING PROGRAM

## 2023-04-04 PROCEDURE — D9220A PRA ANESTHESIA: ICD-10-PCS | Mod: CRNA,,, | Performed by: NURSE ANESTHETIST, CERTIFIED REGISTERED

## 2023-04-04 PROCEDURE — 37000009 HC ANESTHESIA EA ADD 15 MINS: Performed by: STUDENT IN AN ORGANIZED HEALTH CARE EDUCATION/TRAINING PROGRAM

## 2023-04-04 RX ORDER — ONDANSETRON 2 MG/ML
INJECTION INTRAMUSCULAR; INTRAVENOUS
Status: DISCONTINUED | OUTPATIENT
Start: 2023-04-04 | End: 2023-04-04

## 2023-04-04 RX ORDER — OXYCODONE AND ACETAMINOPHEN 5; 325 MG/1; MG/1
1 TABLET ORAL
Status: DISCONTINUED | OUTPATIENT
Start: 2023-04-04 | End: 2023-04-04 | Stop reason: HOSPADM

## 2023-04-04 RX ORDER — ONDANSETRON 2 MG/ML
8 INJECTION INTRAMUSCULAR; INTRAVENOUS EVERY 6 HOURS PRN
Status: DISCONTINUED | OUTPATIENT
Start: 2023-04-04 | End: 2023-04-10

## 2023-04-04 RX ORDER — KETAMINE HCL IN 0.9 % NACL 50 MG/5 ML
SYRINGE (ML) INTRAVENOUS
Status: DISCONTINUED | OUTPATIENT
Start: 2023-04-04 | End: 2023-04-04

## 2023-04-04 RX ORDER — DIPHENHYDRAMINE HYDROCHLORIDE 50 MG/ML
INJECTION INTRAMUSCULAR; INTRAVENOUS
Status: DISCONTINUED | OUTPATIENT
Start: 2023-04-04 | End: 2023-04-04

## 2023-04-04 RX ORDER — PROCHLORPERAZINE EDISYLATE 5 MG/ML
5 INJECTION INTRAMUSCULAR; INTRAVENOUS EVERY 30 MIN PRN
Status: DISCONTINUED | OUTPATIENT
Start: 2023-04-04 | End: 2023-04-04 | Stop reason: HOSPADM

## 2023-04-04 RX ORDER — MORPHINE SULFATE 2 MG/ML
2 INJECTION, SOLUTION INTRAMUSCULAR; INTRAVENOUS EVERY 4 HOURS PRN
Status: DISCONTINUED | OUTPATIENT
Start: 2023-04-04 | End: 2023-04-04

## 2023-04-04 RX ORDER — FENTANYL CITRATE 50 UG/ML
25 INJECTION, SOLUTION INTRAMUSCULAR; INTRAVENOUS EVERY 5 MIN PRN
Status: DISCONTINUED | OUTPATIENT
Start: 2023-04-04 | End: 2023-04-04 | Stop reason: HOSPADM

## 2023-04-04 RX ORDER — CEFAZOLIN SODIUM 2 G/50ML
2 SOLUTION INTRAVENOUS
Status: COMPLETED | OUTPATIENT
Start: 2023-04-04 | End: 2023-04-04

## 2023-04-04 RX ORDER — ONDANSETRON 8 MG/1
8 TABLET, ORALLY DISINTEGRATING ORAL EVERY 8 HOURS PRN
Status: DISCONTINUED | OUTPATIENT
Start: 2023-04-04 | End: 2023-04-10

## 2023-04-04 RX ORDER — HYDROMORPHONE HYDROCHLORIDE 2 MG/ML
0.2 INJECTION, SOLUTION INTRAMUSCULAR; INTRAVENOUS; SUBCUTANEOUS EVERY 5 MIN PRN
Status: DISCONTINUED | OUTPATIENT
Start: 2023-04-04 | End: 2023-04-04 | Stop reason: HOSPADM

## 2023-04-04 RX ORDER — PROMETHAZINE HYDROCHLORIDE 25 MG/1
25 TABLET ORAL EVERY 6 HOURS PRN
Status: DISCONTINUED | OUTPATIENT
Start: 2023-04-04 | End: 2023-04-10

## 2023-04-04 RX ORDER — PROPOFOL 10 MG/ML
VIAL (ML) INTRAVENOUS
Status: DISCONTINUED | OUTPATIENT
Start: 2023-04-04 | End: 2023-04-04

## 2023-04-04 RX ORDER — ROCURONIUM BROMIDE 10 MG/ML
INJECTION, SOLUTION INTRAVENOUS
Status: DISCONTINUED | OUTPATIENT
Start: 2023-04-04 | End: 2023-04-04

## 2023-04-04 RX ORDER — HYDROCODONE BITARTRATE AND ACETAMINOPHEN 5; 325 MG/1; MG/1
1 TABLET ORAL EVERY 4 HOURS PRN
Status: DISCONTINUED | OUTPATIENT
Start: 2023-04-04 | End: 2023-04-11 | Stop reason: HOSPADM

## 2023-04-04 RX ORDER — FENTANYL CITRATE 50 UG/ML
INJECTION, SOLUTION INTRAMUSCULAR; INTRAVENOUS
Status: DISCONTINUED | OUTPATIENT
Start: 2023-04-04 | End: 2023-04-04

## 2023-04-04 RX ORDER — ACETAMINOPHEN 10 MG/ML
INJECTION, SOLUTION INTRAVENOUS
Status: DISCONTINUED | OUTPATIENT
Start: 2023-04-04 | End: 2023-04-04

## 2023-04-04 RX ORDER — MORPHINE SULFATE 4 MG/ML
4 INJECTION, SOLUTION INTRAMUSCULAR; INTRAVENOUS EVERY 4 HOURS PRN
Status: DISCONTINUED | OUTPATIENT
Start: 2023-04-04 | End: 2023-04-11 | Stop reason: HOSPADM

## 2023-04-04 RX ORDER — ACETAMINOPHEN 325 MG/1
650 TABLET ORAL EVERY 8 HOURS PRN
Status: DISCONTINUED | OUTPATIENT
Start: 2023-04-04 | End: 2023-04-11 | Stop reason: HOSPADM

## 2023-04-04 RX ORDER — TALC
6 POWDER (GRAM) TOPICAL NIGHTLY PRN
Status: DISCONTINUED | OUTPATIENT
Start: 2023-04-04 | End: 2023-04-11 | Stop reason: HOSPADM

## 2023-04-04 RX ORDER — HYDROCODONE BITARTRATE AND ACETAMINOPHEN 10; 325 MG/1; MG/1
1 TABLET ORAL EVERY 6 HOURS PRN
Status: DISCONTINUED | OUTPATIENT
Start: 2023-04-04 | End: 2023-04-04

## 2023-04-04 RX ORDER — LIDOCAINE HYDROCHLORIDE 10 MG/ML
INJECTION INFILTRATION; PERINEURAL
Status: DISCONTINUED | OUTPATIENT
Start: 2023-04-04 | End: 2023-04-04 | Stop reason: HOSPADM

## 2023-04-04 RX ORDER — LIDOCAINE HYDROCHLORIDE 20 MG/ML
INJECTION, SOLUTION EPIDURAL; INFILTRATION; INTRACAUDAL; PERINEURAL
Status: DISCONTINUED | OUTPATIENT
Start: 2023-04-04 | End: 2023-04-04

## 2023-04-04 RX ORDER — MIDAZOLAM HYDROCHLORIDE 1 MG/ML
INJECTION INTRAMUSCULAR; INTRAVENOUS
Status: DISCONTINUED | OUTPATIENT
Start: 2023-04-04 | End: 2023-04-04

## 2023-04-04 RX ORDER — ONDANSETRON 2 MG/ML
4 INJECTION INTRAMUSCULAR; INTRAVENOUS DAILY PRN
Status: DISCONTINUED | OUTPATIENT
Start: 2023-04-04 | End: 2023-04-04 | Stop reason: HOSPADM

## 2023-04-04 RX ORDER — BUPIVACAINE HYDROCHLORIDE 5 MG/ML
INJECTION, SOLUTION PERINEURAL
Status: DISCONTINUED | OUTPATIENT
Start: 2023-04-04 | End: 2023-04-04 | Stop reason: HOSPADM

## 2023-04-04 RX ORDER — DEXAMETHASONE SODIUM PHOSPHATE 4 MG/ML
INJECTION, SOLUTION INTRA-ARTICULAR; INTRALESIONAL; INTRAMUSCULAR; INTRAVENOUS; SOFT TISSUE
Status: DISCONTINUED | OUTPATIENT
Start: 2023-04-04 | End: 2023-04-04

## 2023-04-04 RX ADMIN — HYPROMELLOSE 2910 2 DROP: 5 SOLUTION OPHTHALMIC at 10:04

## 2023-04-04 RX ADMIN — HYDROCODONE BITARTRATE AND ACETAMINOPHEN 1 TABLET: 5; 325 TABLET ORAL at 08:04

## 2023-04-04 RX ADMIN — DEXAMETHASONE SODIUM PHOSPHATE 8 MG: 4 INJECTION, SOLUTION INTRA-ARTICULAR; INTRALESIONAL; INTRAMUSCULAR; INTRAVENOUS; SOFT TISSUE at 11:04

## 2023-04-04 RX ADMIN — OXYCODONE HYDROCHLORIDE AND ACETAMINOPHEN 1 TABLET: 5; 325 TABLET ORAL at 03:04

## 2023-04-04 RX ADMIN — FENTANYL CITRATE 25 MCG: 50 INJECTION, SOLUTION INTRAMUSCULAR; INTRAVENOUS at 12:04

## 2023-04-04 RX ADMIN — SODIUM CHLORIDE, SODIUM LACTATE, POTASSIUM CHLORIDE, AND CALCIUM CHLORIDE: .6; .31; .03; .02 INJECTION, SOLUTION INTRAVENOUS at 10:04

## 2023-04-04 RX ADMIN — SODIUM CHLORIDE: 0.9 INJECTION, SOLUTION INTRAVENOUS at 10:04

## 2023-04-04 RX ADMIN — ACETAMINOPHEN 1000 MG: 10 INJECTION, SOLUTION INTRAVENOUS at 12:04

## 2023-04-04 RX ADMIN — PROPOFOL 10 MG: 10 INJECTION, EMULSION INTRAVENOUS at 10:04

## 2023-04-04 RX ADMIN — CEFAZOLIN SODIUM 2 G: 2 SOLUTION INTRAVENOUS at 10:04

## 2023-04-04 RX ADMIN — Medication 20 MG: at 11:04

## 2023-04-04 RX ADMIN — PROPOFOL 100 MG: 10 INJECTION, EMULSION INTRAVENOUS at 10:04

## 2023-04-04 RX ADMIN — FENTANYL CITRATE 50 MCG: 50 INJECTION, SOLUTION INTRAMUSCULAR; INTRAVENOUS at 10:04

## 2023-04-04 RX ADMIN — FENTANYL CITRATE 50 MCG: 50 INJECTION, SOLUTION INTRAMUSCULAR; INTRAVENOUS at 01:04

## 2023-04-04 RX ADMIN — Medication 30 MG: at 11:04

## 2023-04-04 RX ADMIN — Medication 6 MG: at 08:04

## 2023-04-04 RX ADMIN — HYDROMORPHONE HYDROCHLORIDE 0.2 MG: 2 INJECTION, SOLUTION INTRAMUSCULAR; INTRAVENOUS; SUBCUTANEOUS at 03:04

## 2023-04-04 RX ADMIN — FENTANYL CITRATE 50 MCG: 50 INJECTION, SOLUTION INTRAMUSCULAR; INTRAVENOUS at 11:04

## 2023-04-04 RX ADMIN — LIDOCAINE HYDROCHLORIDE 50 MG: 20 INJECTION, SOLUTION EPIDURAL; INFILTRATION; INTRACAUDAL; PERINEURAL at 10:04

## 2023-04-04 RX ADMIN — ROCURONIUM BROMIDE 50 MG: 10 INJECTION, SOLUTION INTRAVENOUS at 10:04

## 2023-04-04 RX ADMIN — DIPHENHYDRAMINE HYDROCHLORIDE 12.5 MG: 50 INJECTION INTRAMUSCULAR; INTRAVENOUS at 12:04

## 2023-04-04 RX ADMIN — ONDANSETRON 8 MG: 2 INJECTION, SOLUTION INTRAMUSCULAR; INTRAVENOUS at 11:04

## 2023-04-04 RX ADMIN — MIDAZOLAM HYDROCHLORIDE 2 MG: 1 INJECTION, SOLUTION INTRAMUSCULAR; INTRAVENOUS at 10:04

## 2023-04-04 NOTE — PLAN OF CARE
Patient has met PACU discharge criteria, VSS, pain well controlled. Family updated by phone. Released from PACU by Anesthesia.

## 2023-04-04 NOTE — OP NOTE
[place] - Surgery ([facility])  General Surgery  Operative Note    SUMMARY     Date of Procedure: 4/4/2023     Procedure: Procedure(s) (LRB):  LAPAROSCOPY, DIAGNOSTIC (N/A)  EXCISION, SMALL INTESTINE, LAPAROSCOPIC (N/A)     Exploratory laparotomy    Surgeon(s) and Role:     * Tyrone Martínez MD - Primary    Assisting Surgeon: Yrn Howell PGY2    Pre-Operative Diagnosis: Acute upper gastrointestinal hemorrhage [K92.2]  Small bowel mass [K63.89]    Post-Operative Diagnosis: Post-Op Diagnosis Codes:     * Acute upper gastrointestinal hemorrhage [K92.2]     * Small bowel mass [K63.89]    Anesthesia: General    Operative Findings (including complications, if any):   Intussussecpted tatooed applecore lesion about 100cm from the ligament of treitz  No signs of perforation or necrosis  No omental or peritoneal implants  Several large LNs noted within mesentery  Sever 1-2cm liver cysts throughout the liver, no obvious solid masses  Remainder of small bowel was normal without mass    Description of Technical Procedures:   Brought into OR and placed supine. An umbilical hernia was noted about 1.5cm. A small incision was made above this and a 5mm camera was inserted into the abdomen. The abdomen was insufflated. A 5mm trocar was isnerted in the LLQ. The small bowel was examined. There was a dilated portion of small bowel in the RUQ. Several liver cysts were noted.  No peritoneal or omental implants were seen. The tatoo was not initially visible because the tumor was intussuscepted. A vertical midline laparotomy incision was made. The intussuscepted bowel was delivered through the incsiion. The remainder of the small bowel and run and was normal. Several enlarged lymph nodes were found in the mesentery adjacent to the small bowel lesion. A tumor was palpable within the intussusception. No perforation was noted. The bowel was left intussussecpted and the small bowel was divided widely over 10cm proximal and distal to the  lesion. The mesentery was divided using the ligasure. A large arterial branch was ligated using silk ties. After this there was noted to be palpable blood flow and some bleeding at the staplelines. The specimen was sent for pathology. The anastomsis was performed using a blue load from the KAE stapler. The common enterotomy was oversewn using 2-0vloc. The mesenteric defect was closed using running 0 vicryl. The abdomen was irrigated. Fascia was closed using running 1 PDS. Skin was closed using staples.     Significant Surgical Tasks Conducted by the Assistant(s), if Applicable:     Estimated Blood Loss (EBL): 100ml           Implants: * No implants in log *    Specimens:   Specimen (24h ago, onward)      None                    Condition: Stable    Disposition: PACU - hemodynamically stable.    Attestation: I was present and scrubbed for the entire procedure.

## 2023-04-04 NOTE — ANESTHESIA PROCEDURE NOTES
Intubation    Date/Time: 4/4/2023 10:43 AM  Performed by: Queta Coombs CRNA  Authorized by: Darrius Rainey MD     Intubation:     Induction:  Intravenous    Intubated:  Postinduction    Mask Ventilation:  Easy with oral airway    Attempts:  1    Attempted By:  CRNA    Method of Intubation:  Video laryngoscopy    Blade:  Worley 3    Laryngeal View Grade: Grade I - full view of cords      Difficult Airway Encountered?: No      Complications:  None    Airway Device:  Oral endotracheal tube    Airway Device Size:  7.5    Inflation Amount (mL):  6    Tube secured:  21    Secured at:  The lips    Placement Verified By:  Capnometry    Complicating Factors:  None    Findings Post-Intubation:  BS equal bilateral

## 2023-04-04 NOTE — ANESTHESIA PREPROCEDURE EVALUATION
04/04/2023  oPli Young is a 67 y.o., male.      Pre-op Assessment    I have reviewed the Patient Summary Reports.     I have reviewed the Nursing Notes. I have reviewed the NPO Status.   I have reviewed the Medications.     Review of Systems  Anesthesia Hx:  Denies Family Hx of Anesthesia complications.    Social:  Non-Smoker    Hematology/Oncology:     Oncology Normal    -- Anemia: Hematology Comments: Acute gi bleed    EENT/Dental:EENT/Dental Normal   Cardiovascular:   Hypertension  Denies Angina. hyperlipidemia  Denies Cardiovascular Symptoms.  Denies Coronary Artery Disease.  Denies Valvular Heart Disease. Denies Congenital Heart Disease.    Denies Congenital Heart Disease.   Denies Deep Venous Thrombosis (DVT)  Hypertension   Denies Disorder of Cardiac Rhythm       Pulmonary:  Pulmonary Normal  Denies Pulmonary Symptoms.  Denies Asthma.  Denies Chronic Obstructive Pulmonary Disease (COPD).  Denies Obstructive Sleep Apnea (DEMETRIO) Denies Possible Obstructive Sleep Apnea      Denies Pulmonary Infection.    Renal/:  Renal/ Normal   Denies Renal Symptoms/Infections/Stones    Hepatic/GI:  Hepatic/GI Normal  Hepatic/GI Symptoms: melena.  Denies Liver Disease    Musculoskeletal:  Musculoskeletal Normal  Denies Musculoskeletal General/Symptoms  Denies Rheumatic Disease  Denies Cervical Spine Disorder    Neurological:  Neurology Normal  Denies Dx of Headaches Denies Seizure Disorder  Denies CVA - Cerebrovasular Accident  Denies TIA - Transient Ischemic Attack  Denies Movement Disorder Dx Denies Neuromuscular Disease   Endocrine:  Endocrine Normal  Denies Diabetes  Denies Thyroid Disease    Dermatological:  Skin Normal    Psych:  Psychiatric Normal           Physical Exam  General: Well nourished, Cooperative, Alert and Oriented    Airway:  Mallampati: I   Mouth Opening: Normal  TM Distance: Normal  Tongue:  Normal  Neck ROM: Normal ROM    Chest/Lungs:  Clear to auscultation, Normal Respiratory Rate    Heart:  Rate: Normal  Rhythm: Regular Rhythm        Anesthesia Plan  Type of Anesthesia, risks & benefits discussed:    Anesthesia Type: Gen ETT  Intra-op Monitoring Plan: Standard ASA Monitors  Post Op Pain Control Plan: multimodal analgesia  Induction:  IV  Airway Plan: Direct  Informed Consent: Informed consent signed with the Patient and all parties understand the risks and agree with anesthesia plan.  All questions answered. Patient consented to blood products? Yes  ASA Score: 2  Day of Surgery Review of History & Physical: H&P Update referred to the surgeon/provider.    Ready For Surgery From Anesthesia Perspective.     .

## 2023-04-04 NOTE — PLAN OF CARE
04/04/23 1834   Admission   Initial VN Admission Questions Complete   Communication Issues? None   Shift   Virtual Nurse - Patient Verbalized Approval Of Camera Use   Safety/Activity   Patient Rounds visualized patient;call light in patient/parent reach;bed in low position;bed wheels locked;clutter free environment maintained;ID band on;placement of personal items at bedside  (daughter at bedside)

## 2023-04-04 NOTE — TRANSFER OF CARE
Anesthesia Transfer of Care Note    Patient: Poli Young    Procedure(s) Performed: Procedure(s) (LRB):  LAPAROSCOPY, DIAGNOSTIC (N/A)  EXCISION, SMALL INTESTINE/SMALL BOWEL RESECTION (N/A)    Patient location: PACU    Anesthesia Type: general    Transport from OR: Transported from OR on 6-10 L/min O2 by face mask with adequate spontaneous ventilation    Post pain: adequate analgesia    Post assessment: no apparent anesthetic complications    Post vital signs: stable    Level of consciousness: awake    Nausea/Vomiting: no nausea/vomiting    Complications: none    Transfer of care protocol was followed      Last vitals:   Visit Vitals  /68 (BP Location: Left arm, Patient Position: Lying)   Pulse 69   Temp 36.8 °C (98.2 °F) (Skin)   Resp 16   Ht 6' (1.829 m)   Wt 89.4 kg (197 lb)   SpO2 99%   BMI 26.72 kg/m²

## 2023-04-04 NOTE — NURSING
Pt. To unit and VSS. Pt. Oriented to room and call light. Family at bedside. Pt. Instructed to call for assistance.

## 2023-04-05 ENCOUNTER — PATIENT OUTREACH (OUTPATIENT)
Dept: ADMINISTRATIVE | Facility: OTHER | Age: 68
End: 2023-04-05
Payer: MEDICARE

## 2023-04-05 LAB
ALBUMIN SERPL BCP-MCNC: 3.1 G/DL (ref 3.5–5.2)
ALP SERPL-CCNC: 74 U/L (ref 55–135)
ALT SERPL W/O P-5'-P-CCNC: 15 U/L (ref 10–44)
ANION GAP SERPL CALC-SCNC: 10 MMOL/L (ref 8–16)
AST SERPL-CCNC: 16 U/L (ref 10–40)
BASOPHILS # BLD AUTO: 0.01 K/UL (ref 0–0.2)
BASOPHILS NFR BLD: 0.1 % (ref 0–1.9)
BILIRUB SERPL-MCNC: 0.4 MG/DL (ref 0.1–1)
BUN SERPL-MCNC: 16 MG/DL (ref 8–23)
CALCIUM SERPL-MCNC: 8.7 MG/DL (ref 8.7–10.5)
CHLORIDE SERPL-SCNC: 108 MMOL/L (ref 95–110)
CO2 SERPL-SCNC: 19 MMOL/L (ref 23–29)
CREAT SERPL-MCNC: 1.1 MG/DL (ref 0.5–1.4)
DIFFERENTIAL METHOD: ABNORMAL
EOSINOPHIL # BLD AUTO: 0 K/UL (ref 0–0.5)
EOSINOPHIL NFR BLD: 0 % (ref 0–8)
ERYTHROCYTE [DISTWIDTH] IN BLOOD BY AUTOMATED COUNT: 15.6 % (ref 11.5–14.5)
EST. GFR  (NO RACE VARIABLE): >60 ML/MIN/1.73 M^2
GLUCOSE SERPL-MCNC: 110 MG/DL (ref 70–110)
HCT VFR BLD AUTO: 33.1 % (ref 40–54)
HGB BLD-MCNC: 10.1 G/DL (ref 14–18)
IMM GRANULOCYTES # BLD AUTO: 0.04 K/UL (ref 0–0.04)
IMM GRANULOCYTES NFR BLD AUTO: 0.4 % (ref 0–0.5)
LYMPHOCYTES # BLD AUTO: 1.4 K/UL (ref 1–4.8)
LYMPHOCYTES NFR BLD: 13.8 % (ref 18–48)
MAGNESIUM SERPL-MCNC: 1.8 MG/DL (ref 1.6–2.6)
MCH RBC QN AUTO: 26.4 PG (ref 27–31)
MCHC RBC AUTO-ENTMCNC: 30.5 G/DL (ref 32–36)
MCV RBC AUTO: 86 FL (ref 82–98)
MONOCYTES # BLD AUTO: 0.7 K/UL (ref 0.3–1)
MONOCYTES NFR BLD: 7.5 % (ref 4–15)
NEUTROPHILS # BLD AUTO: 7.8 K/UL (ref 1.8–7.7)
NEUTROPHILS NFR BLD: 78.2 % (ref 38–73)
NRBC BLD-RTO: 0 /100 WBC
PHOSPHATE SERPL-MCNC: 4.5 MG/DL (ref 2.7–4.5)
PLATELET # BLD AUTO: 271 K/UL (ref 150–450)
PMV BLD AUTO: 8.9 FL (ref 9.2–12.9)
POTASSIUM SERPL-SCNC: 5.2 MMOL/L (ref 3.5–5.1)
PROT SERPL-MCNC: 6.8 G/DL (ref 6–8.4)
RBC # BLD AUTO: 3.83 M/UL (ref 4.6–6.2)
SODIUM SERPL-SCNC: 137 MMOL/L (ref 136–145)
WBC # BLD AUTO: 9.92 K/UL (ref 3.9–12.7)

## 2023-04-05 PROCEDURE — 83735 ASSAY OF MAGNESIUM: CPT

## 2023-04-05 PROCEDURE — 25000003 PHARM REV CODE 250: Performed by: STUDENT IN AN ORGANIZED HEALTH CARE EDUCATION/TRAINING PROGRAM

## 2023-04-05 PROCEDURE — 84100 ASSAY OF PHOSPHORUS: CPT

## 2023-04-05 PROCEDURE — 36415 COLL VENOUS BLD VENIPUNCTURE: CPT

## 2023-04-05 PROCEDURE — 63600175 PHARM REV CODE 636 W HCPCS: Performed by: STUDENT IN AN ORGANIZED HEALTH CARE EDUCATION/TRAINING PROGRAM

## 2023-04-05 PROCEDURE — 63600175 PHARM REV CODE 636 W HCPCS

## 2023-04-05 PROCEDURE — 85025 COMPLETE CBC W/AUTO DIFF WBC: CPT

## 2023-04-05 PROCEDURE — 99900035 HC TECH TIME PER 15 MIN (STAT)

## 2023-04-05 PROCEDURE — 11000001 HC ACUTE MED/SURG PRIVATE ROOM

## 2023-04-05 PROCEDURE — 80053 COMPREHEN METABOLIC PANEL: CPT

## 2023-04-05 PROCEDURE — 94761 N-INVAS EAR/PLS OXIMETRY MLT: CPT

## 2023-04-05 RX ORDER — ATORVASTATIN CALCIUM 10 MG/1
10 TABLET, FILM COATED ORAL DAILY
Status: DISCONTINUED | OUTPATIENT
Start: 2023-04-06 | End: 2023-04-11 | Stop reason: HOSPADM

## 2023-04-05 RX ORDER — NAPROXEN SODIUM 220 MG/1
81 TABLET, FILM COATED ORAL DAILY
Status: DISCONTINUED | OUTPATIENT
Start: 2023-04-06 | End: 2023-04-11 | Stop reason: HOSPADM

## 2023-04-05 RX ORDER — ENOXAPARIN SODIUM 100 MG/ML
40 INJECTION SUBCUTANEOUS EVERY 24 HOURS
Status: DISCONTINUED | OUTPATIENT
Start: 2023-04-05 | End: 2023-04-11 | Stop reason: HOSPADM

## 2023-04-05 RX ORDER — SODIUM CHLORIDE, SODIUM LACTATE, POTASSIUM CHLORIDE, CALCIUM CHLORIDE 600; 310; 30; 20 MG/100ML; MG/100ML; MG/100ML; MG/100ML
INJECTION, SOLUTION INTRAVENOUS CONTINUOUS
Status: DISCONTINUED | OUTPATIENT
Start: 2023-04-05 | End: 2023-04-05

## 2023-04-05 RX ORDER — MAGNESIUM SULFATE HEPTAHYDRATE 40 MG/ML
2 INJECTION, SOLUTION INTRAVENOUS ONCE
Status: COMPLETED | OUTPATIENT
Start: 2023-04-05 | End: 2023-04-05

## 2023-04-05 RX ORDER — AMLODIPINE BESYLATE 5 MG/1
10 TABLET ORAL DAILY
Status: DISCONTINUED | OUTPATIENT
Start: 2023-04-06 | End: 2023-04-11 | Stop reason: HOSPADM

## 2023-04-05 RX ADMIN — MAGNESIUM SULFATE 2 G: 2 INJECTION INTRAVENOUS at 08:04

## 2023-04-05 RX ADMIN — MORPHINE SULFATE 4 MG: 4 INJECTION INTRAVENOUS at 05:04

## 2023-04-05 RX ADMIN — MORPHINE SULFATE 4 MG: 4 INJECTION INTRAVENOUS at 11:04

## 2023-04-05 RX ADMIN — SODIUM CHLORIDE, SODIUM LACTATE, POTASSIUM CHLORIDE, AND CALCIUM CHLORIDE: .6; .31; .03; .02 INJECTION, SOLUTION INTRAVENOUS at 08:04

## 2023-04-05 RX ADMIN — MORPHINE SULFATE 4 MG: 4 INJECTION INTRAVENOUS at 10:04

## 2023-04-05 RX ADMIN — MORPHINE SULFATE 4 MG: 4 INJECTION INTRAVENOUS at 06:04

## 2023-04-05 RX ADMIN — HYDROCODONE BITARTRATE AND ACETAMINOPHEN 1 TABLET: 5; 325 TABLET ORAL at 03:04

## 2023-04-05 RX ADMIN — HYDROCODONE BITARTRATE AND ACETAMINOPHEN 1 TABLET: 5; 325 TABLET ORAL at 08:04

## 2023-04-05 RX ADMIN — ENOXAPARIN SODIUM 40 MG: 40 INJECTION SUBCUTANEOUS at 09:04

## 2023-04-05 NOTE — PLAN OF CARE
Patient is AAOx4. Complaints of 7/10 pain, PRN norco administered per MAR. No complaints of N/V. Safety maintained.

## 2023-04-05 NOTE — PLAN OF CARE
ASCENCION met with pt at bedside to complete DCA this AM. Per pt he lives at home with JENNA Mehta [phone] and will have her help transport him home at time of d/c. Pt stated that he does not have any HME at home and he is independent in her ADL's. SW will request f/u appts. White board updated with CM name and contact information.  Discharge brochure provided.  Pt encouraged to call with any questions or concerns.  Cm will continue to follow pt through transitions of care and assist with any discharge needs.    Juan Antonio Younger, MSSAMANTHA  [phone]    Future Appointments   Date Time Provider Department Center   4/18/2023  2:20 PM Tyrone Martínez MD [facility]   5/10/2023  1:40 PM Aamir Rushing MD [facility]        04/05/23 1052   Discharge Assessment   Assessment Type Discharge Planning Assessment   Confirmed/corrected address, phone number and insurance Yes   Confirmed Demographics Correct on Facesheet   Source of Information patient   When was your last doctors appointment?   (per pt 1 week)   Communicated BARBARA with patient/caregiver Yes   Reason For Admission Small bowel mass   People in Home significant other   Facility Arrived From: home   Do you expect to return to your current living situation? Yes   Do you have help at home or someone to help you manage your care at home? Yes   Who are your caregiver(s) and their phone number(s)? JENNA Mehta [phone]   Prior to hospitilization cognitive status: Alert/Oriented   Current cognitive status: Alert/Oriented   Home Accessibility wheelchair accessible   Home Layout Able to live on 1st floor   Equipment Currently Used at Home none   Readmission within 30 days? No   Patient currently being followed by outpatient case management? No   Do you currently have service(s) that help you manage your care at home? No   Do you take prescription medications? Yes   Do you have prescription coverage? Yes   Coverage PHN   Do you have any problems  affording any of your prescribed medications? No   Is the patient taking medications as prescribed? yes   Who is going to help you get home at discharge? S.TULIO Mehta [phone]   How do you get to doctors appointments? family or friend will provide;car, drives self   Are you on dialysis? No   Do you take coumadin? No   Discharge Plan A Home with family   DME Needed Upon Discharge  none   Discharge Plan discussed with: Patient   Discharge Barriers Identified None   OTHER   Name(s) of People in Home S.O. Janine [phone]

## 2023-04-05 NOTE — PROGRESS NOTES
[facility]  General Surgery  Progress Note    Subjective:     Interval History:   Pain controlled  No NV  Nicola clears  No flatus yet      Post-Op Info:  Procedure(s) (LRB):  LAPAROSCOPY, DIAGNOSTIC (N/A)  EXCISION, SMALL INTESTINE/SMALL BOWEL RESECTION (N/A)  LAPAROTOMY, EXPLORATORY (N/A)   1 Day Post-Op      Medications:  Continuous Infusions:  Scheduled Meds:  PRN Meds:acetaminophen, HYDROcodone-acetaminophen, melatonin, morphine, ondansetron, ondansetron, pneumoc 20-orin conj-dip cr(PF), promethazine     Objective:     Vital Signs (Most Recent):  Temp: 98.1 °F (36.7 °C) (04/05/23 1042)  Pulse: 81 (04/05/23 1113)  Resp: 16 (04/05/23 1143)  BP: (!) 151/78 (04/05/23 1042)  SpO2: (!) 94 % (04/05/23 1113)   Vital Signs (24h Range):  Temp:  [97.7 °F (36.5 °C)-99 °F (37.2 °C)] 98.1 °F (36.7 °C)  Pulse:  [68-82] 81  Resp:  [10-19] 16  SpO2:  [93 %-99 %] 94 %  BP: (130-156)/(72-81) 151/78       Intake/Output Summary (Last 24 hours) at 4/5/2023 1504  Last data filed at 4/5/2023 1140  Gross per 24 hour   Intake 484.12 ml   Output 1100 ml   Net -615.88 ml       Physical Exam  Ab appropraite  Dressing clean    Significant Labs:  CBC:   Recent Labs   Lab 04/05/23  0531   WBC 9.92   RBC 3.83*   HGB 10.1*   HCT 33.1*      MCV 86   MCH 26.4*   MCHC 30.5*     CMP:   Recent Labs   Lab 04/05/23  0531      CALCIUM 8.7   ALBUMIN 3.1*   PROT 6.8      K 5.2*   CO2 19*      BUN 16   CREATININE 1.1   ALKPHOS 74   ALT 15   AST 16   BILITOT 0.4       Significant Diagnostics:  None    Assessment/Plan:     Active Diagnoses:    Diagnosis Date Noted POA    PRINCIPAL PROBLEM:  Small bowel mass [K63.89] 04/04/2023 Yes      Problems Resolved During this Admission:     S/p SBR  Dressing down tomorrow  Will leave on clears for now, doesn't want to advance just yet  Stop ivf  OOB  Likely home tomorrow if tolerates reg diet      Tyrone Martínez MD  General Surgery  [facility]

## 2023-04-05 NOTE — PROGRESS NOTES
IP Liaison - Initial Visit Note    Patient: Poli Young  MRN:  [id number]  Date of Service:  4/5/2023  Completed by:  ANASTASIA Yoder    Reason for Visit   Patient presents with    IP Liaison Initial Visit       RSW met with patient at bedside in order to complete SDOH questionnaire and liaison assessment. Pt has identified no social barriers to care. Pt expresed having a strong support from family. Pt declined the need for resources at this time.    The following were addressed during this visit:  - Review SDOH Questions   - Complete patient assessment   - Complete initial visit with patient        Patient Summary     IP Liaison Patient Assessment    General  Level of Caregiver support: Member independent and does not need caregiver assistance  Have you had to make a decision between paying for any of the following in the last 2 months?: None  Transportation means: Self, Family  Assessments  Was the PHQ Depression Screening completed this visit?: Yes  Was the DI-7 Screening completed this visit?: No       ANASTASIA Yoder

## 2023-04-06 ENCOUNTER — PATIENT OUTREACH (OUTPATIENT)
Dept: ADMINISTRATIVE | Facility: OTHER | Age: 68
End: 2023-04-06
Payer: MEDICARE

## 2023-04-06 LAB
ALBUMIN SERPL BCP-MCNC: 3.1 G/DL (ref 3.5–5.2)
ANION GAP SERPL CALC-SCNC: 11 MMOL/L (ref 8–16)
BASOPHILS # BLD AUTO: 0.01 K/UL (ref 0–0.2)
BASOPHILS NFR BLD: 0.2 % (ref 0–1.9)
BUN SERPL-MCNC: 18 MG/DL (ref 8–23)
CALCIUM SERPL-MCNC: 9.1 MG/DL (ref 8.7–10.5)
CHLORIDE SERPL-SCNC: 105 MMOL/L (ref 95–110)
CO2 SERPL-SCNC: 21 MMOL/L (ref 23–29)
CREAT SERPL-MCNC: 0.9 MG/DL (ref 0.5–1.4)
DIFFERENTIAL METHOD: ABNORMAL
EOSINOPHIL # BLD AUTO: 0 K/UL (ref 0–0.5)
EOSINOPHIL NFR BLD: 0 % (ref 0–8)
ERYTHROCYTE [DISTWIDTH] IN BLOOD BY AUTOMATED COUNT: 15.2 % (ref 11.5–14.5)
EST. GFR  (NO RACE VARIABLE): >60 ML/MIN/1.73 M^2
GLUCOSE SERPL-MCNC: 132 MG/DL (ref 70–110)
HCT VFR BLD AUTO: 33.8 % (ref 40–54)
HGB BLD-MCNC: 10.6 G/DL (ref 14–18)
IMM GRANULOCYTES # BLD AUTO: 0.02 K/UL (ref 0–0.04)
IMM GRANULOCYTES NFR BLD AUTO: 0.3 % (ref 0–0.5)
LYMPHOCYTES # BLD AUTO: 0.7 K/UL (ref 1–4.8)
LYMPHOCYTES NFR BLD: 12.2 % (ref 18–48)
MAGNESIUM SERPL-MCNC: 2 MG/DL (ref 1.6–2.6)
MCH RBC QN AUTO: 26.2 PG (ref 27–31)
MCHC RBC AUTO-ENTMCNC: 31.4 G/DL (ref 32–36)
MCV RBC AUTO: 84 FL (ref 82–98)
MONOCYTES # BLD AUTO: 0.4 K/UL (ref 0.3–1)
MONOCYTES NFR BLD: 6.6 % (ref 4–15)
NEUTROPHILS # BLD AUTO: 4.6 K/UL (ref 1.8–7.7)
NEUTROPHILS NFR BLD: 80.7 % (ref 38–73)
NRBC BLD-RTO: 0 /100 WBC
PHOSPHATE SERPL-MCNC: 4 MG/DL (ref 2.7–4.5)
PLATELET # BLD AUTO: 306 K/UL (ref 150–450)
PMV BLD AUTO: 8.3 FL (ref 9.2–12.9)
POTASSIUM SERPL-SCNC: 4.3 MMOL/L (ref 3.5–5.1)
RBC # BLD AUTO: 4.04 M/UL (ref 4.6–6.2)
SODIUM SERPL-SCNC: 137 MMOL/L (ref 136–145)
WBC # BLD AUTO: 5.73 K/UL (ref 3.9–12.7)

## 2023-04-06 PROCEDURE — 63600175 PHARM REV CODE 636 W HCPCS: Performed by: STUDENT IN AN ORGANIZED HEALTH CARE EDUCATION/TRAINING PROGRAM

## 2023-04-06 PROCEDURE — 80069 RENAL FUNCTION PANEL: CPT

## 2023-04-06 PROCEDURE — 25000003 PHARM REV CODE 250

## 2023-04-06 PROCEDURE — 36415 COLL VENOUS BLD VENIPUNCTURE: CPT

## 2023-04-06 PROCEDURE — 97530 THERAPEUTIC ACTIVITIES: CPT

## 2023-04-06 PROCEDURE — 63600175 PHARM REV CODE 636 W HCPCS

## 2023-04-06 PROCEDURE — 83735 ASSAY OF MAGNESIUM: CPT

## 2023-04-06 PROCEDURE — 11000001 HC ACUTE MED/SURG PRIVATE ROOM

## 2023-04-06 PROCEDURE — 85025 COMPLETE CBC W/AUTO DIFF WBC: CPT

## 2023-04-06 PROCEDURE — 25000003 PHARM REV CODE 250: Performed by: STUDENT IN AN ORGANIZED HEALTH CARE EDUCATION/TRAINING PROGRAM

## 2023-04-06 PROCEDURE — 99900035 HC TECH TIME PER 15 MIN (STAT)

## 2023-04-06 PROCEDURE — 97161 PT EVAL LOW COMPLEX 20 MIN: CPT

## 2023-04-06 RX ORDER — DEXTROSE MONOHYDRATE, SODIUM CHLORIDE, AND POTASSIUM CHLORIDE 50; 1.49; 4.5 G/1000ML; G/1000ML; G/1000ML
INJECTION, SOLUTION INTRAVENOUS CONTINUOUS
Status: DISCONTINUED | OUTPATIENT
Start: 2023-04-06 | End: 2023-04-09

## 2023-04-06 RX ADMIN — ENOXAPARIN SODIUM 40 MG: 40 INJECTION SUBCUTANEOUS at 04:04

## 2023-04-06 RX ADMIN — ONDANSETRON HYDROCHLORIDE 8 MG: 2 SOLUTION INTRAMUSCULAR; INTRAVENOUS at 08:04

## 2023-04-06 RX ADMIN — HYDROCODONE BITARTRATE AND ACETAMINOPHEN 1 TABLET: 5; 325 TABLET ORAL at 09:04

## 2023-04-06 RX ADMIN — ASPIRIN 81 MG CHEWABLE TABLET 81 MG: 81 TABLET CHEWABLE at 10:04

## 2023-04-06 RX ADMIN — DEXTROSE, SODIUM CHLORIDE, AND POTASSIUM CHLORIDE: 5; .45; .15 INJECTION INTRAVENOUS at 04:04

## 2023-04-06 RX ADMIN — AMLODIPINE BESYLATE 10 MG: 5 TABLET ORAL at 10:04

## 2023-04-06 RX ADMIN — MORPHINE SULFATE 4 MG: 4 INJECTION INTRAVENOUS at 10:04

## 2023-04-06 RX ADMIN — Medication 6 MG: at 09:04

## 2023-04-06 RX ADMIN — ATORVASTATIN CALCIUM 10 MG: 10 TABLET, FILM COATED ORAL at 10:04

## 2023-04-06 NOTE — ASSESSMENT & PLAN NOTE
Poli Young is a 67 y.o. male with biopsy proven small bowel adenocarcinoma now status post small bowel resection on 4/4/23. Given the interval development of N/V and abdominal distention without fevers or leukocytosis he likely has developed a post-operative ileus.     -NPO, ok for meds and ice chips  -mIVF while NPO  -PRN pain and anti-emetics  -Continue home anti-hypertensives, statin, and ASA  -Daily labs  -PT/OT  -DVT PPX: LVX    Dispo: TBD pending resolution of ileus and dietary tolerance.

## 2023-04-06 NOTE — ANESTHESIA POSTPROCEDURE EVALUATION
Anesthesia Post Evaluation    Patient: Poli Young    Procedure(s) Performed: Procedure(s) (LRB):  LAPAROSCOPY, DIAGNOSTIC (N/A)  EXCISION, SMALL INTESTINE/SMALL BOWEL RESECTION (N/A)  LAPAROTOMY, EXPLORATORY (N/A)    Final Anesthesia Type: general      Patient location during evaluation: PACU  Patient participation: Yes- Able to Participate  Level of consciousness: awake and alert  Post-procedure vital signs: reviewed and stable  Pain management: adequate  Airway patency: patent    PONV status at discharge: No PONV  Anesthetic complications: no      Cardiovascular status: stable  Respiratory status: spontaneous ventilation  Hydration status: euvolemic  Follow-up not needed.          Vitals Value Taken Time   /94 04/06/23 0729   Temp 36.8 °C (98.2 °F) 04/06/23 0729   Pulse 97 04/06/23 0729   Resp 19 04/06/23 0729   SpO2 95 % 04/06/23 0729         Event Time   Out of Recovery 17:35:00         Pain/Gui Score: Pain Rating Prior to Med Admin: 8 (4/5/2023 10:00 PM)  Pain Rating Post Med Admin: 3 (4/5/2023 10:30 PM)

## 2023-04-06 NOTE — NURSING
Assumed care of Pt from WILLIE Gonzalez. Pt AAOx4, sitting up in bed, with no c/o pain at this time. Bed in low/locked position with call light within reach.

## 2023-04-06 NOTE — NURSING
Patient reports nausea, and that he vomited this am, emesis not witnessed, medicated per MAR for reports of nausea.

## 2023-04-06 NOTE — PT/OT/SLP EVAL
Physical Therapy Evaluation and Discharge Note    Patient Name:  Poli Young   MRN:  [id number]    Recommendations:     Discharge Recommendations: home  Discharge Equipment Recommendations: none   Barriers to discharge: None    Assessment:     Poli Young is a 67 y.o. male admitted with a medical diagnosis of Small bowel mass. .  At this time, patient is functioning at their prior level of function and does not require further acute PT services.     Pt ambulated ~250 ft with no AD and with SPV-Mod I. No LOB episodes. Pt reports 6-7/10 abdominal pain. Recommending d/c home with no therapy or DME needs. Pt educated on UE/LE exercises and continued mobility/ambulation. No further skilled acute PT needs identified. D/c PT.    Recent Surgery: Procedure(s) (LRB):  LAPAROSCOPY, DIAGNOSTIC (N/A)  EXCISION, SMALL INTESTINE/SMALL BOWEL RESECTION (N/A)  LAPAROTOMY, EXPLORATORY (N/A) 2 Days Post-Op    Plan:     During this hospitalization, patient does not require further acute PT services.  Please re-consult if situation changes.      Subjective     Chief Complaint: post-op abdominal pain  Patient/Family Comments/goals: pt is pleasant and agreeable to participate in therapy  Pain/Comfort:  Pain Rating 1:  (6-7/10)  Location - Orientation 1: generalized  Location 1: abdomen  Pain Addressed 1: Pre-medicate for activity, Reposition, Distraction, Nurse notified  Pain Rating Post-Intervention 1: 7/10    Patients cultural, spiritual, Buddhist conflicts given the current situation: no    Living Environment:  Pt lives with his son in a  4 MARCELINO, B HR, and T/S  Prior to admission, patients level of function was Independent, driving, retired, and reports no hx of falls.  Equipment used at home: none.  DME owned (not currently used): none.  Upon discharge, patient will have assistance from family.    Objective:     Communicated with nsg prior to session.  Patient found HOB elevated with peripheral IV upon PT entry to  room.    General Precautions: Standard, fall    Orthopedic Precautions:N/A   Braces: N/A  Respiratory Status: Room air    Exams:  Cognitive Exam:  Patient is oriented to Person, Place, Time, and Situation  Postural Exam:  Patient presented with the following abnormalities:    -       Rounded shoulders  Sensation:    -       denies any numbness/tingling   Skin Integrity/Edema:      -       Skin integrity:  post-op abdominal incisions   -       Edema: None noted BLE  RUE ROM: WFL  RUE Strength: WFL  LUE ROM: WFL  LUE Strength: WFL  RLE ROM: WFL  RLE Strength: WFL  LLE ROM: WFL  LLE Strength: WFL    Functional Mobility:  Bed Mobility:     Rolling Left:  stand by assistance  Scooting: supervision  Supine to Sit: stand by assistance  Transfers:     Sit to Stand:  supervision with no AD  Bed to Chair: modified independence with  no AD  using  Step Transfer  Gait: Pt ambulated ~250 ft with no AD and with SPV-Mod I. No LOB episodes. Mild forward flexed trunk and stiffness noted - VC's for upright posture    AM-PAC 6 CLICK MOBILITY  Total Score:21       Treatment and Education:  Pt educated on role of PT/POC, bracing with pillow, log rolling technique, and overall safety.  Pt ambulated ~250 ft with no AD and with SPV-Mod I.   No LOB episodes. Pt reports 6-7/10 abdominal pain and mild increase with ambulation.   Recommending d/c home with no therapy or DME needs.   Pt educated on UE/LE exercises and continued mobility/ambulation  Pt up in chair    AM-PAC 6 CLICK MOBILITY  Total Score:21     Patient left up in chair with all lines intact, call button in reach, nsg notified, and sister present.    GOALS:   Multidisciplinary Problems       Physical Therapy Goals          Problem: Physical Therapy    Goal Priority Disciplines Outcome Goal Variances Interventions   Physical Therapy Goal     PT, PT/OT Adequate for Care Transition     Description: Goals to be met by: 5/6/23     Patient will increase functional independence with  mobility by performin. Gait  x 200 feet with Modified [place] and Supervision using No Assistive Device.  MET 23                         History:     Past Medical History:   Diagnosis Date    Hyperlipemia     Hypertension        Past Surgical History:   Procedure Laterality Date    DIAGNOSTIC LAPAROSCOPY N/A 2023    Procedure: LAPAROSCOPY, DIAGNOSTIC;  Surgeon: Tyrone Martínez MD;  Location: [facility];  Service: General;  Laterality: N/A;    ESOPHAGOGASTRODUODENOSCOPY N/A 2022    Procedure: EGD (ESOPHAGOGASTRODUODENOSCOPY);  Surgeon: Aamir Rushing MD;  Location: [facility];  Service: Endoscopy;  Laterality: N/A;    EXCISION, SMALL INTESTINE N/A 2023    Procedure: EXCISION, SMALL INTESTINE/SMALL BOWEL RESECTION;  Surgeon: Tyrone Martínez MD;  Location: [facility];  Service: General;  Laterality: N/A;    INTRALUMINAL GASTROINTESTINAL TRACT IMAGING VIA CAPSULE N/A 2023    Procedure: IMAGING PROCEDURE, GI TRACT, INTRALUMINAL, VIA CAPSULE;  Surgeon: Aamir Rushing MD;  Location: [facility];  Service: Endoscopy;  Laterality: N/A;    LAPAROTOMY, EXPLORATORY N/A 2023    Procedure: LAPAROTOMY, EXPLORATORY;  Surgeon: Tyrone Martínez MD;  Location: [facility] OR;  Service: General;  Laterality: N/A;    SMALL BOWEL ENTEROSCOPY N/A 3/20/2023    Procedure: ENTEROSCOPY Upper DBE;  Surgeon: Giancarlo Cruz MD;  Location: [facility] ENDO;  Service: Endoscopy;  Laterality: N/A;       Time Tracking:     PT Received On: 23  PT Start Time: 1017     PT Stop Time: 1037  PT Total Time (min): 20 min     Billable Minutes: Evaluation 10 and Therapeutic Activity 10      2023

## 2023-04-06 NOTE — PROGRESS NOTES
RSW met with patient to discuss discharge and additional patient barriers to care. Pt identified no additional social barriers to care. Per pt, pt is not in need of resources at this time.    The following were addressed during this visit:  -Complete follow-up with patient    ANASTASIA Yoder

## 2023-04-06 NOTE — NURSING
Received report from Jennifer, received the patient lying supine, awake and reports no needs at this time.

## 2023-04-06 NOTE — PLAN OF CARE
No anticipated needs for pt at time of d/c. SW requested f/u appts. Pt will have transport home. Cm will continue to follow pt through transitions of care and assist with any discharge needs.    Juan Antonio Younger, MSSAMANTHA  [phone]    Future Appointments   Date Time Provider Department Center   4/18/2023  2:20 PM Tyrone Mratínez MD [facility]   5/10/2023  1:40 PM Aamir Rushing MD [facility]        04/06/23 1444   Post-Acute Status   Coverage PHN   Discharge Plan   Discharge Plan A Home

## 2023-04-06 NOTE — SUBJECTIVE & OBJECTIVE
Interval History: Mr. Young states he vomited early this morning prior to AM rounds and this was proceeded by a bout of nausea. He is not hungry and endorses frequent belching. Apart from mild soreness following emesis, he otherwise feels well. He endorses flatulence, but no BM.     Medications:  Continuous Infusions:  Scheduled Meds:   amLODIPine  10 mg Oral Daily    aspirin  81 mg Oral Daily    atorvastatin  10 mg Oral Daily    enoxaparin  40 mg Subcutaneous Daily     PRN Meds:acetaminophen, HYDROcodone-acetaminophen, melatonin, morphine, ondansetron, ondansetron, pneumoc 20-orin conj-dip cr(PF), promethazine     Review of patient's allergies indicates:  No Known Allergies  Objective:     Vital Signs (Most Recent):  Temp: 100 °F (37.8 °C) (04/06/23 1140)  Pulse: 95 (04/06/23 1140)  Resp: 19 (04/06/23 1140)  BP: (!) 151/84 (04/06/23 1140)  SpO2: 95 % (04/06/23 1140)   Vital Signs (24h Range):  Temp:  [97.6 °F (36.4 °C)-100 °F (37.8 °C)] 100 °F (37.8 °C)  Pulse:  [] 95  Resp:  [14-20] 19  SpO2:  [92 %-95 %] 95 %  BP: (136-174)/(76-94) 151/84     Weight: 88.9 kg (195 lb 15.8 oz)  Body mass index is 26.58 kg/m².    Intake/Output - Last 3 Shifts         04/04 0700  04/05 0659 04/05 0700  04/06 0659 04/06 0700  04/07 0659    P.O.  300     I.V. (mL/kg)  603.5 (6.8)     IV Piggyback 2100      Total Intake(mL/kg) 2100 (23.6) 903.5 (10.2)     Urine (mL/kg/hr) 500 1300 (0.6)     Stool  0     Total Output 500 1300     Net +1600 -396.6            Urine Occurrence 2 x      Stool Occurrence  0 x 0 x            Physical Exam  Vitals and nursing note reviewed.   Constitutional:       General: He is not in acute distress.     Appearance: Normal appearance. He is not ill-appearing.   Eyes:      General: No scleral icterus.     Extraocular Movements: Extraocular movements intact.   Cardiovascular:      Rate and Rhythm: Normal rate.   Pulmonary:      Effort: Pulmonary effort is normal. No respiratory distress.   Abdominal:       General: There is distension.      Palpations: Abdomen is soft.      Tenderness: There is no guarding or rebound.      Comments: Incision well approximated with staples. C/D/I. Appropriately tender to palpation.    Skin:     General: Skin is warm and dry.   Neurological:      General: No focal deficit present.      Mental Status: He is alert and oriented to person, place, and time.       Significant Labs:  I have reviewed all pertinent lab results within the past 24 hours.    Significant Diagnostics:  I have reviewed all pertinent imaging results/findings within the past 24 hours.

## 2023-04-06 NOTE — PLAN OF CARE
Problem: Physical Therapy  Goal: Physical Therapy Goal  Description: Goals to be met by: 23     Patient will increase functional independence with mobility by performin. Gait  x 200 feet with Modified [place] and Supervision using No Assistive Device.  MET 23    Outcome: Adequate for Care Transition     PT Eval completed, note to follow. Pt ambulated ~250 ft with no AD and with SPV-Mod I. No LOB episodes. Pt reports 6-7/10 abdominal pain. Recommending d/c home with no therapy or DME needs. Pt educated on UE/LE exercises and continued mobility/ambulation. No further skilled acute PT needs identified. D/c PT.

## 2023-04-06 NOTE — PROGRESS NOTES
[facility]  General Surgery  Progress Note    Subjective:     History of Present Illness:  No notes on file    Post-Op Info:  Procedure(s) (LRB):  LAPAROSCOPY, DIAGNOSTIC (N/A)  EXCISION, SMALL INTESTINE/SMALL BOWEL RESECTION (N/A)  LAPAROTOMY, EXPLORATORY (N/A)   2 Days Post-Op     Interval History: Mr. Young states he vomited early this morning prior to AM rounds and this was proceeded by a bout of nausea. He is not hungry and endorses frequent belching. Apart from mild soreness following emesis, he otherwise feels well. He endorses flatulence, but no BM.     Medications:  Continuous Infusions:  Scheduled Meds:   amLODIPine  10 mg Oral Daily    aspirin  81 mg Oral Daily    atorvastatin  10 mg Oral Daily    enoxaparin  40 mg Subcutaneous Daily     PRN Meds:acetaminophen, HYDROcodone-acetaminophen, melatonin, morphine, ondansetron, ondansetron, pneumoc 20-orin conj-dip cr(PF), promethazine     Review of patient's allergies indicates:  No Known Allergies  Objective:     Vital Signs (Most Recent):  Temp: 100 °F (37.8 °C) (04/06/23 1140)  Pulse: 95 (04/06/23 1140)  Resp: 19 (04/06/23 1140)  BP: (!) 151/84 (04/06/23 1140)  SpO2: 95 % (04/06/23 1140)   Vital Signs (24h Range):  Temp:  [97.6 °F (36.4 °C)-100 °F (37.8 °C)] 100 °F (37.8 °C)  Pulse:  [] 95  Resp:  [14-20] 19  SpO2:  [92 %-95 %] 95 %  BP: (136-174)/(76-94) 151/84     Weight: 88.9 kg (195 lb 15.8 oz)  Body mass index is 26.58 kg/m².    Intake/Output - Last 3 Shifts         04/04 0700  04/05 0659 04/05 0700  04/06 0659 04/06 0700  04/07 0659    P.O.  300     I.V. (mL/kg)  603.5 (6.8)     IV Piggyback 2100      Total Intake(mL/kg) 2100 (23.6) 903.5 (10.2)     Urine (mL/kg/hr) 500 1300 (0.6)     Stool  0     Total Output 500 1300     Net +1600 -396.6            Urine Occurrence 2 x      Stool Occurrence  0 x 0 x            Physical Exam  Vitals and nursing note reviewed.   Constitutional:       General: He is not in acute distress.      Appearance: Normal appearance. He is not ill-appearing.   Eyes:      General: No scleral icterus.     Extraocular Movements: Extraocular movements intact.   Cardiovascular:      Rate and Rhythm: Normal rate.   Pulmonary:      Effort: Pulmonary effort is normal. No respiratory distress.   Abdominal:      General: There is distension.      Palpations: Abdomen is soft.      Tenderness: There is no guarding or rebound.      Comments: Incision well approximated with staples. C/D/I. Appropriately tender to palpation.    Skin:     General: Skin is warm and dry.   Neurological:      General: No focal deficit present.      Mental Status: He is alert and oriented to person, place, and time.       Significant Labs:  I have reviewed all pertinent lab results within the past 24 hours.    Significant Diagnostics:  I have reviewed all pertinent imaging results/findings within the past 24 hours.    Assessment/Plan:     * Small bowel mass  Poli Young is a 67 y.o. male with biopsy proven small bowel adenocarcinoma now status post small bowel resection on 4/4/23. Given the interval development of N/V and abdominal distention without fevers or leukocytosis he likely has developed a post-operative ileus.     -NPO, ok for meds and ice chips  -mIVF while NPO  -PRN pain and anti-emetics  -Continue home anti-hypertensives, statin, and ASA  -Daily labs  -PT/OT  -DVT PPX: LVX    Dispo: TBD pending resolution of ileus and dietary tolerance.         Yrn Howell MD  General Surgery  [place] [facility]

## 2023-04-07 LAB
ALBUMIN SERPL BCP-MCNC: 2.7 G/DL (ref 3.5–5.2)
ANION GAP SERPL CALC-SCNC: 10 MMOL/L (ref 8–16)
BASOPHILS # BLD AUTO: 0.02 K/UL (ref 0–0.2)
BASOPHILS NFR BLD: 0.4 % (ref 0–1.9)
BUN SERPL-MCNC: 22 MG/DL (ref 8–23)
CALCIUM SERPL-MCNC: 8.6 MG/DL (ref 8.7–10.5)
CHLORIDE SERPL-SCNC: 105 MMOL/L (ref 95–110)
CO2 SERPL-SCNC: 22 MMOL/L (ref 23–29)
CREAT SERPL-MCNC: 0.9 MG/DL (ref 0.5–1.4)
DIFFERENTIAL METHOD: ABNORMAL
EOSINOPHIL # BLD AUTO: 0 K/UL (ref 0–0.5)
EOSINOPHIL NFR BLD: 0.4 % (ref 0–8)
ERYTHROCYTE [DISTWIDTH] IN BLOOD BY AUTOMATED COUNT: 14.9 % (ref 11.5–14.5)
EST. GFR  (NO RACE VARIABLE): >60 ML/MIN/1.73 M^2
GLUCOSE SERPL-MCNC: 124 MG/DL (ref 70–110)
HCT VFR BLD AUTO: 31.3 % (ref 40–54)
HGB BLD-MCNC: 10.1 G/DL (ref 14–18)
IMM GRANULOCYTES # BLD AUTO: 0.01 K/UL (ref 0–0.04)
IMM GRANULOCYTES NFR BLD AUTO: 0.2 % (ref 0–0.5)
LYMPHOCYTES # BLD AUTO: 0.8 K/UL (ref 1–4.8)
LYMPHOCYTES NFR BLD: 18.1 % (ref 18–48)
MAGNESIUM SERPL-MCNC: 2 MG/DL (ref 1.6–2.6)
MCH RBC QN AUTO: 26.7 PG (ref 27–31)
MCHC RBC AUTO-ENTMCNC: 32.3 G/DL (ref 32–36)
MCV RBC AUTO: 83 FL (ref 82–98)
MONOCYTES # BLD AUTO: 0.5 K/UL (ref 0.3–1)
MONOCYTES NFR BLD: 10.8 % (ref 4–15)
NEUTROPHILS # BLD AUTO: 3.2 K/UL (ref 1.8–7.7)
NEUTROPHILS NFR BLD: 70.1 % (ref 38–73)
NRBC BLD-RTO: 0 /100 WBC
PHOSPHATE SERPL-MCNC: 3.5 MG/DL (ref 2.7–4.5)
PLATELET # BLD AUTO: 286 K/UL (ref 150–450)
PMV BLD AUTO: 8.4 FL (ref 9.2–12.9)
POTASSIUM SERPL-SCNC: 4.4 MMOL/L (ref 3.5–5.1)
RBC # BLD AUTO: 3.78 M/UL (ref 4.6–6.2)
SODIUM SERPL-SCNC: 137 MMOL/L (ref 136–145)
WBC # BLD AUTO: 4.52 K/UL (ref 3.9–12.7)

## 2023-04-07 PROCEDURE — 36415 COLL VENOUS BLD VENIPUNCTURE: CPT

## 2023-04-07 PROCEDURE — 80069 RENAL FUNCTION PANEL: CPT

## 2023-04-07 PROCEDURE — 83735 ASSAY OF MAGNESIUM: CPT

## 2023-04-07 PROCEDURE — 25000003 PHARM REV CODE 250

## 2023-04-07 PROCEDURE — 85025 COMPLETE CBC W/AUTO DIFF WBC: CPT

## 2023-04-07 PROCEDURE — 11000001 HC ACUTE MED/SURG PRIVATE ROOM

## 2023-04-07 PROCEDURE — 25000003 PHARM REV CODE 250: Performed by: STUDENT IN AN ORGANIZED HEALTH CARE EDUCATION/TRAINING PROGRAM

## 2023-04-07 PROCEDURE — 94799 UNLISTED PULMONARY SVC/PX: CPT

## 2023-04-07 PROCEDURE — 99900035 HC TECH TIME PER 15 MIN (STAT)

## 2023-04-07 PROCEDURE — 63600175 PHARM REV CODE 636 W HCPCS

## 2023-04-07 PROCEDURE — 94761 N-INVAS EAR/PLS OXIMETRY MLT: CPT

## 2023-04-07 RX ADMIN — Medication 6 MG: at 08:04

## 2023-04-07 RX ADMIN — HYDROCODONE BITARTRATE AND ACETAMINOPHEN 1 TABLET: 5; 325 TABLET ORAL at 08:04

## 2023-04-07 RX ADMIN — DEXTROSE, SODIUM CHLORIDE, AND POTASSIUM CHLORIDE: 5; .45; .15 INJECTION INTRAVENOUS at 12:04

## 2023-04-07 RX ADMIN — ATORVASTATIN CALCIUM 10 MG: 10 TABLET, FILM COATED ORAL at 09:04

## 2023-04-07 RX ADMIN — AMLODIPINE BESYLATE 10 MG: 5 TABLET ORAL at 09:04

## 2023-04-07 RX ADMIN — ENOXAPARIN SODIUM 40 MG: 40 INJECTION SUBCUTANEOUS at 04:04

## 2023-04-07 RX ADMIN — ASPIRIN 81 MG CHEWABLE TABLET 81 MG: 81 TABLET CHEWABLE at 09:04

## 2023-04-07 RX ADMIN — HYDROCODONE BITARTRATE AND ACETAMINOPHEN 1 TABLET: 5; 325 TABLET ORAL at 04:04

## 2023-04-07 RX ADMIN — HYDROCODONE BITARTRATE AND ACETAMINOPHEN 1 TABLET: 5; 325 TABLET ORAL at 05:04

## 2023-04-07 RX ADMIN — DEXTROSE, SODIUM CHLORIDE, AND POTASSIUM CHLORIDE: 5; .45; .15 INJECTION INTRAVENOUS at 10:04

## 2023-04-07 RX ADMIN — DEXTROSE, SODIUM CHLORIDE, AND POTASSIUM CHLORIDE: 5; .45; .15 INJECTION INTRAVENOUS at 02:04

## 2023-04-07 NOTE — PROGRESS NOTES
Progress Note  General Surgery    Admit Date: 4/4/2023  S/P: Procedure(s) (LRB):  LAPAROSCOPY, DIAGNOSTIC (N/A)  EXCISION, SMALL INTESTINE/SMALL BOWEL RESECTION (N/A)  LAPAROTOMY, EXPLORATORY (N/A)    Post-operative Day: 3 Days Post-Op    Hospital Day: 4    SUBJECTIVE:     No acute events overnight. Patient states pain is better, +flatus, but still some distension  OBJECTIVE:     Vital Signs (Most Recent)  Temp: 98.4 °F (36.9 °C) (04/07/23 0728)  Pulse: 79 (04/07/23 0728)  Resp: 19 (04/07/23 0728)  BP: 124/71 (04/07/23 0728)  SpO2: (!) 94 % (04/07/23 0728)    Vital Signs Range (Last 24H):  Temp:  [97.5 °F (36.4 °C)-100 °F (37.8 °C)]   Pulse:  [79-95]   Resp:  [18-20]   BP: (124-152)/(71-84)   SpO2:  [92 %-95 %]     I & O (Last 24H):  Intake/Output Summary (Last 24 hours) at 4/7/2023 1011  Last data filed at 4/7/2023 0528  Gross per 24 hour   Intake 1772.49 ml   Output 0 ml   Net 1772.49 ml       Physical Exam:  Gen: NAD   HEENT: NCAT  Pulm: unlabored, symmetrical   Abd: Soft, appropriate nttp. No rebound, guarding.     Wound/Incision:  clean, dry, intact    Laboratory:  Labs within the past 24 hours have been reviewed.    ASSESSMENT/PLAN:     Assessment/Plan:  Slowly advance diet    Sanjeev Figueroa M.D., F.A.C.S.  [facility] and General Surgery  [facility] [place]

## 2023-04-07 NOTE — NURSING
Received report from Jodie, received the patient lying supine in bed awake and responsive, no needs voiced at present.

## 2023-04-07 NOTE — NURSING
Patient AAOx4. C/o pain to abd. PRN pain meds admisnitered per orders. Reports Nausea intermittent. Continues to be NPO x meds and ice. Denies flatus or bm. Cont IV fluids infusing. Meds administered per orders. Coarse breath sounds to auscultation that improve with TCDB. Instructed to keep moving and to do breathing exercises. IS placed at bedside.  Glasses at bedside. Urinal at bedside. Instructed to call for assistance. Safety maintained. Will continue to monitor.

## 2023-04-08 LAB
ALBUMIN SERPL BCP-MCNC: 2.8 G/DL (ref 3.5–5.2)
ANION GAP SERPL CALC-SCNC: 9 MMOL/L (ref 8–16)
BASOPHILS # BLD AUTO: 0.01 K/UL (ref 0–0.2)
BASOPHILS NFR BLD: 0.3 % (ref 0–1.9)
BUN SERPL-MCNC: 16 MG/DL (ref 8–23)
CALCIUM SERPL-MCNC: 8.7 MG/DL (ref 8.7–10.5)
CHLORIDE SERPL-SCNC: 104 MMOL/L (ref 95–110)
CO2 SERPL-SCNC: 20 MMOL/L (ref 23–29)
CREAT SERPL-MCNC: 0.8 MG/DL (ref 0.5–1.4)
DIFFERENTIAL METHOD: ABNORMAL
EOSINOPHIL # BLD AUTO: 0 K/UL (ref 0–0.5)
EOSINOPHIL NFR BLD: 0.8 % (ref 0–8)
ERYTHROCYTE [DISTWIDTH] IN BLOOD BY AUTOMATED COUNT: 14.6 % (ref 11.5–14.5)
EST. GFR  (NO RACE VARIABLE): >60 ML/MIN/1.73 M^2
GLUCOSE SERPL-MCNC: 129 MG/DL (ref 70–110)
HCT VFR BLD AUTO: 31 % (ref 40–54)
HGB BLD-MCNC: 9.7 G/DL (ref 14–18)
IMM GRANULOCYTES # BLD AUTO: 0.01 K/UL (ref 0–0.04)
IMM GRANULOCYTES NFR BLD AUTO: 0.3 % (ref 0–0.5)
LYMPHOCYTES # BLD AUTO: 0.7 K/UL (ref 1–4.8)
LYMPHOCYTES NFR BLD: 16.5 % (ref 18–48)
MAGNESIUM SERPL-MCNC: 2 MG/DL (ref 1.6–2.6)
MCH RBC QN AUTO: 26.4 PG (ref 27–31)
MCHC RBC AUTO-ENTMCNC: 31.3 G/DL (ref 32–36)
MCV RBC AUTO: 84 FL (ref 82–98)
MONOCYTES # BLD AUTO: 0.5 K/UL (ref 0.3–1)
MONOCYTES NFR BLD: 11.8 % (ref 4–15)
NEUTROPHILS # BLD AUTO: 2.8 K/UL (ref 1.8–7.7)
NEUTROPHILS NFR BLD: 70.3 % (ref 38–73)
NRBC BLD-RTO: 0 /100 WBC
PHOSPHATE SERPL-MCNC: 3 MG/DL (ref 2.7–4.5)
PLATELET # BLD AUTO: 257 K/UL (ref 150–450)
PMV BLD AUTO: 8.4 FL (ref 9.2–12.9)
POTASSIUM SERPL-SCNC: 4.4 MMOL/L (ref 3.5–5.1)
RBC # BLD AUTO: 3.68 M/UL (ref 4.6–6.2)
SODIUM SERPL-SCNC: 133 MMOL/L (ref 136–145)
WBC # BLD AUTO: 3.99 K/UL (ref 3.9–12.7)

## 2023-04-08 PROCEDURE — 99900035 HC TECH TIME PER 15 MIN (STAT)

## 2023-04-08 PROCEDURE — 83735 ASSAY OF MAGNESIUM: CPT

## 2023-04-08 PROCEDURE — 63600175 PHARM REV CODE 636 W HCPCS

## 2023-04-08 PROCEDURE — 85025 COMPLETE CBC W/AUTO DIFF WBC: CPT

## 2023-04-08 PROCEDURE — 94761 N-INVAS EAR/PLS OXIMETRY MLT: CPT

## 2023-04-08 PROCEDURE — 11000001 HC ACUTE MED/SURG PRIVATE ROOM

## 2023-04-08 PROCEDURE — 25000003 PHARM REV CODE 250: Performed by: STUDENT IN AN ORGANIZED HEALTH CARE EDUCATION/TRAINING PROGRAM

## 2023-04-08 PROCEDURE — 25000003 PHARM REV CODE 250: Performed by: SURGERY

## 2023-04-08 PROCEDURE — 25000003 PHARM REV CODE 250

## 2023-04-08 PROCEDURE — 80069 RENAL FUNCTION PANEL: CPT

## 2023-04-08 PROCEDURE — 36415 COLL VENOUS BLD VENIPUNCTURE: CPT

## 2023-04-08 RX ORDER — GUAIFENESIN 600 MG/1
600 TABLET, EXTENDED RELEASE ORAL 2 TIMES DAILY
Status: DISCONTINUED | OUTPATIENT
Start: 2023-04-08 | End: 2023-04-11 | Stop reason: HOSPADM

## 2023-04-08 RX ADMIN — AMLODIPINE BESYLATE 10 MG: 5 TABLET ORAL at 08:04

## 2023-04-08 RX ADMIN — HYDROCODONE BITARTRATE AND ACETAMINOPHEN 1 TABLET: 5; 325 TABLET ORAL at 08:04

## 2023-04-08 RX ADMIN — DEXTROSE, SODIUM CHLORIDE, AND POTASSIUM CHLORIDE: 5; .45; .15 INJECTION INTRAVENOUS at 08:04

## 2023-04-08 RX ADMIN — ASPIRIN 81 MG CHEWABLE TABLET 81 MG: 81 TABLET CHEWABLE at 08:04

## 2023-04-08 RX ADMIN — HYDROCODONE BITARTRATE AND ACETAMINOPHEN 1 TABLET: 5; 325 TABLET ORAL at 01:04

## 2023-04-08 RX ADMIN — ONDANSETRON 8 MG: 8 TABLET, ORALLY DISINTEGRATING ORAL at 04:04

## 2023-04-08 RX ADMIN — ENOXAPARIN SODIUM 40 MG: 40 INJECTION SUBCUTANEOUS at 04:04

## 2023-04-08 RX ADMIN — GUAIFENESIN 600 MG: 600 TABLET, EXTENDED RELEASE ORAL at 04:04

## 2023-04-08 RX ADMIN — ATORVASTATIN CALCIUM 10 MG: 10 TABLET, FILM COATED ORAL at 08:04

## 2023-04-08 NOTE — PLAN OF CARE
Pt is AAOx4. Pt given medications as ordered per MAR. IVFs infusing as scheduled. PRN Norco given for pain. No c/o of n/v or sob. Midline incision with staples YASMIN. Urinal provided and within reach. Safety maintained. Bed alarm set. Instructed to use call light for assistance. Will continue to monitor.

## 2023-04-08 NOTE — PROGRESS NOTES
Progress Note  General Surgery    Admit Date: 4/4/2023  S/P: Procedure(s) (LRB):  LAPAROSCOPY, DIAGNOSTIC (N/A)  EXCISION, SMALL INTESTINE/SMALL BOWEL RESECTION (N/A)  LAPAROTOMY, EXPLORATORY (N/A)    Post-operative Day: 4 Days Post-Op    Hospital Day: 5    SUBJECTIVE:     No acute events overnight. Patient states pain is better, less distended but still feels full, +bowel function  OBJECTIVE:     Vital Signs (Most Recent)  Temp: 98.6 °F (37 °C) (04/08/23 0724)  Pulse: 74 (04/08/23 0724)  Resp: 18 (04/08/23 0816)  BP: 134/63 (04/08/23 0724)  SpO2: (!) 94 % (04/08/23 0814)    Vital Signs Range (Last 24H):  Temp:  [97.4 °F (36.3 °C)-99.4 °F (37.4 °C)]   Pulse:  [68-93]   Resp:  [18-20]   BP: (129-148)/(63-72)   SpO2:  [92 %-95 %]     I & O (Last 24H):  Intake/Output Summary (Last 24 hours) at 4/8/2023 1025  Last data filed at 4/8/2023 0817  Gross per 24 hour   Intake --   Output 350 ml   Net -350 ml       Physical Exam:  Gen: NAD   HEENT: NCAT  Pulm: unlabored, symmetrical   Abd: Soft, appropriate ttp, distended (improved). No rebound, guarding.     Wound/Incision:  clean, dry, intact    Laboratory:  Labs within the past 24 hours have been reviewed.    ASSESSMENT/PLAN:     Assessment/Plan:  Offered to advance diet but pt wants to stay on fulls    Sanjeev Figueroa M.D., F.A.C.S.  [facility] and General Surgery  [facility] [place]

## 2023-04-08 NOTE — PLAN OF CARE
Problem: Adult Inpatient Plan of Care  Goal: Plan of Care Review  Outcome: Ongoing, Progressing  Goal: Patient-Specific Goal (Individualized)  Outcome: Ongoing, Progressing  Goal: Absence of Hospital-Acquired Illness or Injury  Outcome: Ongoing, Progressing  Goal: Optimal Comfort and Wellbeing  Outcome: Ongoing, Progressing  Goal: Readiness for Transition of Care  Outcome: Ongoing, Progressing     Problem: Bleeding (Surgery Nonspecified)  Goal: Absence of Bleeding  Outcome: Ongoing, Progressing     Problem: Bowel Motility Impaired (Surgery Nonspecified)  Goal: Effective Bowel Elimination  Outcome: Ongoing, Progressing     Problem: Fluid and Electrolyte Imbalance (Surgery Nonspecified)  Goal: Fluid and Electrolyte Balance  Outcome: Ongoing, Progressing     Problem: Glycemic Control Impaired (Surgery Nonspecified)  Goal: Blood Glucose Level Within Targeted Range  Outcome: Ongoing, Progressing     Problem: Infection (Surgery Nonspecified)  Goal: Absence of Infection Signs and Symptoms  Outcome: Ongoing, Progressing     Problem: Ongoing Anesthesia Effects (Surgery Nonspecified)  Goal: Anesthesia/Sedation Recovery  Outcome: Ongoing, Progressing     Problem: Pain (Surgery Nonspecified)  Goal: Acceptable Pain Control  Outcome: Ongoing, Progressing     Problem: Postoperative Nausea and Vomiting (Surgery Nonspecified)  Goal: Nausea and Vomiting Relief  Outcome: Ongoing, Progressing     Problem: Postoperative Urinary Retention (Surgery Nonspecified)  Goal: Effective Urinary Elimination  Outcome: Ongoing, Progressing     Problem: Respiratory Compromise (Surgery Nonspecified)  Goal: Effective Oxygenation and Ventilation  Outcome: Ongoing, Progressing     Problem: Hypertension Acute  Goal: Blood Pressure Within Desired Range  Outcome: Ongoing, Progressing     Problem: Fall Injury Risk  Goal: Absence of Fall and Fall-Related Injury  Outcome: Ongoing, Progressing

## 2023-04-08 NOTE — PLAN OF CARE
Problem: Adult Inpatient Plan of Care  Goal: Plan of Care Review  Outcome: Ongoing, Progressing     VIRTUAL NURSE:  Labs, notes, orders, and careplan reviewed.     Reviewed surgery information with patient (surgery sheet, surgery booklet, and blood thinner sheet) in detail. Scheduled for surgery on 7/28/21. Consult placed to Dr. Grant for history and physical, patient aware to follow up with primary if they haven't heard back about scheduling. All questions were answered, no further questions or concerns at this time.

## 2023-04-09 LAB
ALBUMIN SERPL BCP-MCNC: 2.5 G/DL (ref 3.5–5.2)
ANION GAP SERPL CALC-SCNC: 5 MMOL/L (ref 8–16)
ANION GAP SERPL CALC-SCNC: 7 MMOL/L (ref 8–16)
BASOPHILS # BLD AUTO: 0.01 K/UL (ref 0–0.2)
BASOPHILS NFR BLD: 0.2 % (ref 0–1.9)
BUN SERPL-MCNC: 12 MG/DL (ref 8–23)
BUN SERPL-MCNC: 12 MG/DL (ref 8–23)
CALCIUM SERPL-MCNC: 8.1 MG/DL (ref 8.7–10.5)
CALCIUM SERPL-MCNC: 9 MG/DL (ref 8.7–10.5)
CHLORIDE SERPL-SCNC: 101 MMOL/L (ref 95–110)
CHLORIDE SERPL-SCNC: 101 MMOL/L (ref 95–110)
CO2 SERPL-SCNC: 23 MMOL/L (ref 23–29)
CO2 SERPL-SCNC: 26 MMOL/L (ref 23–29)
CREAT SERPL-MCNC: 0.8 MG/DL (ref 0.5–1.4)
CREAT SERPL-MCNC: 0.9 MG/DL (ref 0.5–1.4)
DIFFERENTIAL METHOD: ABNORMAL
EOSINOPHIL # BLD AUTO: 0.1 K/UL (ref 0–0.5)
EOSINOPHIL NFR BLD: 2.1 % (ref 0–8)
ERYTHROCYTE [DISTWIDTH] IN BLOOD BY AUTOMATED COUNT: 14.5 % (ref 11.5–14.5)
EST. GFR  (NO RACE VARIABLE): >60 ML/MIN/1.73 M^2
EST. GFR  (NO RACE VARIABLE): >60 ML/MIN/1.73 M^2
GLUCOSE SERPL-MCNC: 439 MG/DL (ref 70–110)
GLUCOSE SERPL-MCNC: 99 MG/DL (ref 70–110)
HCT VFR BLD AUTO: 27.4 % (ref 40–54)
HGB BLD-MCNC: 8.5 G/DL (ref 14–18)
IMM GRANULOCYTES # BLD AUTO: 0.02 K/UL (ref 0–0.04)
IMM GRANULOCYTES NFR BLD AUTO: 0.5 % (ref 0–0.5)
LYMPHOCYTES # BLD AUTO: 0.7 K/UL (ref 1–4.8)
LYMPHOCYTES NFR BLD: 16.4 % (ref 18–48)
MAGNESIUM SERPL-MCNC: 1.6 MG/DL (ref 1.6–2.6)
MCH RBC QN AUTO: 26.2 PG (ref 27–31)
MCHC RBC AUTO-ENTMCNC: 31 G/DL (ref 32–36)
MCV RBC AUTO: 85 FL (ref 82–98)
MONOCYTES # BLD AUTO: 0.4 K/UL (ref 0.3–1)
MONOCYTES NFR BLD: 9.6 % (ref 4–15)
NEUTROPHILS # BLD AUTO: 3 K/UL (ref 1.8–7.7)
NEUTROPHILS NFR BLD: 71.2 % (ref 38–73)
NRBC BLD-RTO: 0 /100 WBC
PHOSPHATE SERPL-MCNC: 2.9 MG/DL (ref 2.7–4.5)
PLATELET # BLD AUTO: 253 K/UL (ref 150–450)
PMV BLD AUTO: 8.9 FL (ref 9.2–12.9)
POCT GLUCOSE: 110 MG/DL (ref 70–110)
POTASSIUM SERPL-SCNC: 4.9 MMOL/L (ref 3.5–5.1)
POTASSIUM SERPL-SCNC: 5.7 MMOL/L (ref 3.5–5.1)
RBC # BLD AUTO: 3.24 M/UL (ref 4.6–6.2)
SODIUM SERPL-SCNC: 129 MMOL/L (ref 136–145)
SODIUM SERPL-SCNC: 134 MMOL/L (ref 136–145)
WBC # BLD AUTO: 4.27 K/UL (ref 3.9–12.7)

## 2023-04-09 PROCEDURE — 36415 COLL VENOUS BLD VENIPUNCTURE: CPT | Performed by: SURGERY

## 2023-04-09 PROCEDURE — 25000003 PHARM REV CODE 250: Performed by: SURGERY

## 2023-04-09 PROCEDURE — 80069 RENAL FUNCTION PANEL: CPT

## 2023-04-09 PROCEDURE — 99900035 HC TECH TIME PER 15 MIN (STAT)

## 2023-04-09 PROCEDURE — 80048 BASIC METABOLIC PNL TOTAL CA: CPT | Performed by: SURGERY

## 2023-04-09 PROCEDURE — 25000003 PHARM REV CODE 250

## 2023-04-09 PROCEDURE — 85025 COMPLETE CBC W/AUTO DIFF WBC: CPT

## 2023-04-09 PROCEDURE — 25000003 PHARM REV CODE 250: Performed by: STUDENT IN AN ORGANIZED HEALTH CARE EDUCATION/TRAINING PROGRAM

## 2023-04-09 PROCEDURE — 36415 COLL VENOUS BLD VENIPUNCTURE: CPT

## 2023-04-09 PROCEDURE — 63600175 PHARM REV CODE 636 W HCPCS

## 2023-04-09 PROCEDURE — 94761 N-INVAS EAR/PLS OXIMETRY MLT: CPT

## 2023-04-09 PROCEDURE — 11000001 HC ACUTE MED/SURG PRIVATE ROOM

## 2023-04-09 PROCEDURE — 83735 ASSAY OF MAGNESIUM: CPT

## 2023-04-09 PROCEDURE — 63600175 PHARM REV CODE 636 W HCPCS: Performed by: SURGERY

## 2023-04-09 RX ORDER — SODIUM CHLORIDE 9 MG/ML
INJECTION, SOLUTION INTRAVENOUS CONTINUOUS
Status: DISCONTINUED | OUTPATIENT
Start: 2023-04-09 | End: 2023-04-11

## 2023-04-09 RX ORDER — DOCUSATE SODIUM 100 MG/1
100 CAPSULE, LIQUID FILLED ORAL 2 TIMES DAILY
Status: DISCONTINUED | OUTPATIENT
Start: 2023-04-09 | End: 2023-04-11 | Stop reason: HOSPADM

## 2023-04-09 RX ORDER — POLYETHYLENE GLYCOL 3350 17 G/17G
17 POWDER, FOR SOLUTION ORAL 2 TIMES DAILY
Status: DISCONTINUED | OUTPATIENT
Start: 2023-04-09 | End: 2023-04-11 | Stop reason: HOSPADM

## 2023-04-09 RX ADMIN — DOCUSATE SODIUM 100 MG: 100 CAPSULE, LIQUID FILLED ORAL at 10:04

## 2023-04-09 RX ADMIN — Medication 6 MG: at 08:04

## 2023-04-09 RX ADMIN — POLYETHYLENE GLYCOL 3350 17 G: 17 POWDER, FOR SOLUTION ORAL at 08:04

## 2023-04-09 RX ADMIN — SODIUM CHLORIDE: 0.9 INJECTION, SOLUTION INTRAVENOUS at 10:04

## 2023-04-09 RX ADMIN — GUAIFENESIN 600 MG: 600 TABLET, EXTENDED RELEASE ORAL at 08:04

## 2023-04-09 RX ADMIN — ENOXAPARIN SODIUM 40 MG: 40 INJECTION SUBCUTANEOUS at 04:04

## 2023-04-09 RX ADMIN — HYDROCODONE BITARTRATE AND ACETAMINOPHEN 1 TABLET: 5; 325 TABLET ORAL at 08:04

## 2023-04-09 RX ADMIN — DEXTROSE, SODIUM CHLORIDE, AND POTASSIUM CHLORIDE: 5; .45; .15 INJECTION INTRAVENOUS at 01:04

## 2023-04-09 RX ADMIN — AMLODIPINE BESYLATE 10 MG: 5 TABLET ORAL at 08:04

## 2023-04-09 RX ADMIN — POLYETHYLENE GLYCOL 3350 17 G: 17 POWDER, FOR SOLUTION ORAL at 10:04

## 2023-04-09 RX ADMIN — ASPIRIN 81 MG CHEWABLE TABLET 81 MG: 81 TABLET CHEWABLE at 08:04

## 2023-04-09 RX ADMIN — ATORVASTATIN CALCIUM 10 MG: 10 TABLET, FILM COATED ORAL at 08:04

## 2023-04-09 RX ADMIN — ONDANSETRON 8 MG: 8 TABLET, ORALLY DISINTEGRATING ORAL at 04:04

## 2023-04-09 RX ADMIN — DOCUSATE SODIUM 100 MG: 100 CAPSULE, LIQUID FILLED ORAL at 08:04

## 2023-04-09 RX ADMIN — HYDROCODONE BITARTRATE AND ACETAMINOPHEN 1 TABLET: 5; 325 TABLET ORAL at 06:04

## 2023-04-09 NOTE — PLAN OF CARE
Problem: Adult Inpatient Plan of Care  Goal: Plan of Care Review  Outcome: Ongoing, Progressing  Goal: Patient-Specific Goal (Individualized)  Outcome: Ongoing, Progressing  Goal: Absence of Hospital-Acquired Illness or Injury  Outcome: Ongoing, Progressing  Goal: Optimal Comfort and Wellbeing  Outcome: Ongoing, Progressing  Goal: Readiness for Transition of Care  Outcome: Ongoing, Progressing     Problem: Bleeding (Surgery Nonspecified)  Goal: Absence of Bleeding  Outcome: Ongoing, Progressing     Problem: Bowel Motility Impaired (Surgery Nonspecified)  Goal: Effective Bowel Elimination  Outcome: Ongoing, Progressing     Problem: Fluid and Electrolyte Imbalance (Surgery Nonspecified)  Goal: Fluid and Electrolyte Balance  Outcome: Ongoing, Progressing     Problem: Glycemic Control Impaired (Surgery Nonspecified)  Goal: Blood Glucose Level Within Targeted Range  Outcome: Ongoing, Progressing     Problem: Infection (Surgery Nonspecified)  Goal: Absence of Infection Signs and Symptoms  Outcome: Ongoing, Progressing     Problem: Ongoing Anesthesia Effects (Surgery Nonspecified)  Goal: Anesthesia/Sedation Recovery  Outcome: Ongoing, Progressing     Problem: Pain (Surgery Nonspecified)  Goal: Acceptable Pain Control  Outcome: Ongoing, Progressing     Problem: Postoperative Nausea and Vomiting (Surgery Nonspecified)  Goal: Nausea and Vomiting Relief  Outcome: Ongoing, Progressing     Problem: Postoperative Urinary Retention (Surgery Nonspecified)  Goal: Effective Urinary Elimination  Outcome: Ongoing, Progressing     Problem: Respiratory Compromise (Surgery Nonspecified)  Goal: Effective Oxygenation and Ventilation  Outcome: Ongoing, Progressing     Problem: Hypertension Acute  Goal: Blood Pressure Within Desired Range  Outcome: Ongoing, Progressing     Problem: Fall Injury Risk  Goal: Absence of Fall and Fall-Related Injury  Outcome: Ongoing, Progressing     Problem: Hypertension Comorbidity  Goal: Blood Pressure in  Desired Range  Outcome: Ongoing, Progressing     Problem: Bariatric Environmental Safety  Goal: Safety Maintained with Care  Outcome: Ongoing, Progressing

## 2023-04-09 NOTE — PLAN OF CARE
Pt is AAOx4. Pt given medications as ordered per MAR. IVFs infusing as scheduled. PRN Norco given for pain. PRN Zofran given for n/v. No c/o of sob. Midline incision with staples YASMIN. Urinal provided and within reach. Safety maintained. Bed alarm set. Instructed to use call light for assistance. Will continue to monitor.

## 2023-04-10 LAB
ALBUMIN SERPL BCP-MCNC: 2.8 G/DL (ref 3.5–5.2)
ANION GAP SERPL CALC-SCNC: 9 MMOL/L (ref 8–16)
BASOPHILS # BLD AUTO: 0.01 K/UL (ref 0–0.2)
BASOPHILS NFR BLD: 0.2 % (ref 0–1.9)
BUN SERPL-MCNC: 14 MG/DL (ref 8–23)
CALCIUM SERPL-MCNC: 8.9 MG/DL (ref 8.7–10.5)
CHLORIDE SERPL-SCNC: 102 MMOL/L (ref 95–110)
CO2 SERPL-SCNC: 23 MMOL/L (ref 23–29)
CREAT SERPL-MCNC: 0.8 MG/DL (ref 0.5–1.4)
DIFFERENTIAL METHOD: ABNORMAL
EOSINOPHIL # BLD AUTO: 0.1 K/UL (ref 0–0.5)
EOSINOPHIL NFR BLD: 2.2 % (ref 0–8)
ERYTHROCYTE [DISTWIDTH] IN BLOOD BY AUTOMATED COUNT: 14.4 % (ref 11.5–14.5)
EST. GFR  (NO RACE VARIABLE): >60 ML/MIN/1.73 M^2
GLUCOSE SERPL-MCNC: 87 MG/DL (ref 70–110)
HCT VFR BLD AUTO: 29.7 % (ref 40–54)
HGB BLD-MCNC: 9.4 G/DL (ref 14–18)
IMM GRANULOCYTES # BLD AUTO: 0.01 K/UL (ref 0–0.04)
IMM GRANULOCYTES NFR BLD AUTO: 0.2 % (ref 0–0.5)
LYMPHOCYTES # BLD AUTO: 1 K/UL (ref 1–4.8)
LYMPHOCYTES NFR BLD: 23.4 % (ref 18–48)
MAGNESIUM SERPL-MCNC: 1.8 MG/DL (ref 1.6–2.6)
MCH RBC QN AUTO: 26.3 PG (ref 27–31)
MCHC RBC AUTO-ENTMCNC: 31.6 G/DL (ref 32–36)
MCV RBC AUTO: 83 FL (ref 82–98)
MONOCYTES # BLD AUTO: 0.5 K/UL (ref 0.3–1)
MONOCYTES NFR BLD: 10.3 % (ref 4–15)
NEUTROPHILS # BLD AUTO: 2.8 K/UL (ref 1.8–7.7)
NEUTROPHILS NFR BLD: 63.7 % (ref 38–73)
NRBC BLD-RTO: 0 /100 WBC
PHOSPHATE SERPL-MCNC: 3.8 MG/DL (ref 2.7–4.5)
PLATELET # BLD AUTO: 283 K/UL (ref 150–450)
PMV BLD AUTO: 8.5 FL (ref 9.2–12.9)
POTASSIUM SERPL-SCNC: 4.2 MMOL/L (ref 3.5–5.1)
RBC # BLD AUTO: 3.57 M/UL (ref 4.6–6.2)
SODIUM SERPL-SCNC: 134 MMOL/L (ref 136–145)
WBC # BLD AUTO: 4.45 K/UL (ref 3.9–12.7)

## 2023-04-10 PROCEDURE — 36415 COLL VENOUS BLD VENIPUNCTURE: CPT

## 2023-04-10 PROCEDURE — 85025 COMPLETE CBC W/AUTO DIFF WBC: CPT

## 2023-04-10 PROCEDURE — 63600175 PHARM REV CODE 636 W HCPCS

## 2023-04-10 PROCEDURE — 94761 N-INVAS EAR/PLS OXIMETRY MLT: CPT

## 2023-04-10 PROCEDURE — 99900035 HC TECH TIME PER 15 MIN (STAT)

## 2023-04-10 PROCEDURE — 25000003 PHARM REV CODE 250: Performed by: STUDENT IN AN ORGANIZED HEALTH CARE EDUCATION/TRAINING PROGRAM

## 2023-04-10 PROCEDURE — 25000003 PHARM REV CODE 250: Performed by: SURGERY

## 2023-04-10 PROCEDURE — 11000001 HC ACUTE MED/SURG PRIVATE ROOM

## 2023-04-10 PROCEDURE — 25000003 PHARM REV CODE 250

## 2023-04-10 PROCEDURE — 83735 ASSAY OF MAGNESIUM: CPT

## 2023-04-10 PROCEDURE — 80069 RENAL FUNCTION PANEL: CPT

## 2023-04-10 RX ORDER — LANOLIN ALCOHOL/MO/W.PET/CERES
400 CREAM (GRAM) TOPICAL ONCE
Status: COMPLETED | OUTPATIENT
Start: 2023-04-10 | End: 2023-04-10

## 2023-04-10 RX ORDER — PROMETHAZINE HYDROCHLORIDE 25 MG/1
25 TABLET ORAL EVERY 6 HOURS PRN
Status: DISCONTINUED | OUTPATIENT
Start: 2023-04-10 | End: 2023-04-11 | Stop reason: HOSPADM

## 2023-04-10 RX ORDER — ONDANSETRON 8 MG/1
8 TABLET, ORALLY DISINTEGRATING ORAL EVERY 8 HOURS
Status: DISCONTINUED | OUTPATIENT
Start: 2023-04-10 | End: 2023-04-11 | Stop reason: HOSPADM

## 2023-04-10 RX ADMIN — ONDANSETRON 8 MG: 8 TABLET, ORALLY DISINTEGRATING ORAL at 10:04

## 2023-04-10 RX ADMIN — ASPIRIN 81 MG CHEWABLE TABLET 81 MG: 81 TABLET CHEWABLE at 08:04

## 2023-04-10 RX ADMIN — DOCUSATE SODIUM 100 MG: 100 CAPSULE, LIQUID FILLED ORAL at 08:04

## 2023-04-10 RX ADMIN — POLYETHYLENE GLYCOL 3350 17 G: 17 POWDER, FOR SOLUTION ORAL at 08:04

## 2023-04-10 RX ADMIN — Medication 400 MG: at 12:04

## 2023-04-10 RX ADMIN — ATORVASTATIN CALCIUM 10 MG: 10 TABLET, FILM COATED ORAL at 08:04

## 2023-04-10 RX ADMIN — GUAIFENESIN 600 MG: 600 TABLET, EXTENDED RELEASE ORAL at 08:04

## 2023-04-10 RX ADMIN — AMLODIPINE BESYLATE 10 MG: 5 TABLET ORAL at 08:04

## 2023-04-10 RX ADMIN — HYDROCODONE BITARTRATE AND ACETAMINOPHEN 1 TABLET: 5; 325 TABLET ORAL at 05:04

## 2023-04-10 RX ADMIN — PROMETHAZINE HYDROCHLORIDE 25 MG: 25 TABLET ORAL at 08:04

## 2023-04-10 RX ADMIN — ENOXAPARIN SODIUM 40 MG: 40 INJECTION SUBCUTANEOUS at 05:04

## 2023-04-10 NOTE — PLAN OF CARE
Per MD no anticipated needs for pt at time of d/c. SW requested f/u appts. Pt will have transport home. Cm will continue to follow pt through transitions of care and assist with any discharge needs.     Juan Antonio Younger MSSAMANTHA  [phone]     04/10/23 1542   Post-Acute Status   Discharge Delays None known at this time   Discharge Plan   Discharge Plan A Home       Future Appointments   Date Time Provider Department Center   4/18/2023  2:20 PM Tyrone Martínez MD [facility]   5/10/2023  1:40 PM Aamir Rushing MD [facility]

## 2023-04-10 NOTE — PROGRESS NOTES
[facility]  General Surgery  Progress Note    Subjective:     History of Present Illness:  No notes on file    Post-Op Info:  Procedure(s) (LRB):  LAPAROSCOPY, DIAGNOSTIC (N/A)  EXCISION, SMALL INTESTINE/SMALL BOWEL RESECTION (N/A)  LAPAROTOMY, EXPLORATORY (N/A)   6 Days Post-Op     Interval History: Mr. Young reports ongoing issues with nausea and vomited once overnight. States he continues to pass flatus. Did not eat much of dinner given nausea.     Medications:  Continuous Infusions:   sodium chloride 0.9% 50 mL/hr at 04/09/23 1023     Scheduled Meds:   amLODIPine  10 mg Oral Daily    aspirin  81 mg Oral Daily    atorvastatin  10 mg Oral Daily    docusate sodium  100 mg Oral BID    enoxaparin  40 mg Subcutaneous Daily    guaiFENesin  600 mg Oral BID    polyethylene glycol  17 g Oral BID     PRN Meds:acetaminophen, HYDROcodone-acetaminophen, melatonin, morphine, ondansetron, ondansetron, pneumoc 20-orin conj-dip cr(PF), promethazine     Review of patient's allergies indicates:  No Known Allergies  Objective:     Vital Signs (Most Recent):  Temp: 97.7 °F (36.5 °C) (04/10/23 0729)  Pulse: 71 (04/10/23 0729)  Resp: 18 (04/10/23 0729)  BP: (!) 153/78 (04/10/23 0729)  SpO2: 95 % (04/10/23 0818)   Vital Signs (24h Range):  Temp:  [97.7 °F (36.5 °C)-98.8 °F (37.1 °C)] 97.7 °F (36.5 °C)  Pulse:  [70-90] 71  Resp:  [16-20] 18  SpO2:  [93 %-95 %] 95 %  BP: (125-154)/(71-80) 153/78     Weight: (!) 202.1 kg (445 lb 8.8 oz)  Body mass index is 60.43 kg/m².    Intake/Output - Last 3 Shifts         04/08 0700  04/09 0659 04/09 0700  04/10 0659 04/10 0700  04/11 0659    P.O. 360 240     Total Intake(mL/kg) 360 (1.8) 240 (1.2)     Urine (mL/kg/hr) 950 (0.2) 1425 (0.3) 300 (0.8)    Total Output 950 1425 300    Net -590 -1185 -300           Urine Occurrence 1 x              Physical Exam  Vitals and nursing note reviewed.   Constitutional:       General: He is not in acute distress.     Appearance: Normal appearance.  He is not ill-appearing.   Eyes:      General: No scleral icterus.     Extraocular Movements: Extraocular movements intact.   Cardiovascular:      Rate and Rhythm: Normal rate.   Pulmonary:      Effort: Pulmonary effort is normal. No respiratory distress.   Abdominal:      General: There is distension.      Palpations: Abdomen is soft.      Tenderness: There is no guarding or rebound.      Comments: Incision well approximated with staples. C/D/I. Appropriately tender to palpation.    Skin:     General: Skin is warm and dry.   Neurological:      General: No focal deficit present.      Mental Status: He is alert and oriented to person, place, and time.       Significant Labs:  I have reviewed all pertinent lab results within the past 24 hours.    Significant Diagnostics:  I have reviewed all pertinent imaging results/findings within the past 24 hours.    Assessment/Plan:     * Small bowel mass  Poli Young is a 67 y.o. male with biopsy proven small bowel adenocarcinoma now status post small bowel resection on 4/4/23. Given the interval development of N/V and abdominal distention without fevers or leukocytosis he likely has developed a post-operative ileus.     -Continue fulls as tolerated  -Gentle mIVF given limited PO intake   -Will schedule zofran with additional PRNs available  -Continue home anti-hypertensives, statin, and ASA  -Daily labs  -PT/OT  -DVT PPX: LVX    Dispo: TBD pending resolution of ileus and dietary tolerance.         Yrn Howell MD  General Surgery  [facility] S[facility]

## 2023-04-10 NOTE — ASSESSMENT & PLAN NOTE
Poli Young is a 67 y.o. male with biopsy proven small bowel adenocarcinoma now status post small bowel resection on 4/4/23. Given the interval development of N/V and abdominal distention without fevers or leukocytosis he likely has developed a post-operative ileus.     -Continue fulls as tolerated  -Gentle mIVF given limited PO intake   -Will schedule zofran with additional PRNs available  -Continue home anti-hypertensives, statin, and ASA  -Daily labs  -PT/OT  -DVT PPX: LVX    Dispo: TBD pending resolution of ileus and dietary tolerance.

## 2023-04-10 NOTE — SUBJECTIVE & OBJECTIVE
Interval History: Mr. Young reports ongoing issues with nausea and vomited once overnight. States he continues to pass flatus. Did not eat much of dinner given nausea.     Medications:  Continuous Infusions:   sodium chloride 0.9% 50 mL/hr at 04/09/23 1023     Scheduled Meds:   amLODIPine  10 mg Oral Daily    aspirin  81 mg Oral Daily    atorvastatin  10 mg Oral Daily    docusate sodium  100 mg Oral BID    enoxaparin  40 mg Subcutaneous Daily    guaiFENesin  600 mg Oral BID    polyethylene glycol  17 g Oral BID     PRN Meds:acetaminophen, HYDROcodone-acetaminophen, melatonin, morphine, ondansetron, ondansetron, pneumoc 20-orin conj-dip cr(PF), promethazine     Review of patient's allergies indicates:  No Known Allergies  Objective:     Vital Signs (Most Recent):  Temp: 97.7 °F (36.5 °C) (04/10/23 0729)  Pulse: 71 (04/10/23 0729)  Resp: 18 (04/10/23 0729)  BP: (!) 153/78 (04/10/23 0729)  SpO2: 95 % (04/10/23 0818)   Vital Signs (24h Range):  Temp:  [97.7 °F (36.5 °C)-98.8 °F (37.1 °C)] 97.7 °F (36.5 °C)  Pulse:  [70-90] 71  Resp:  [16-20] 18  SpO2:  [93 %-95 %] 95 %  BP: (125-154)/(71-80) 153/78     Weight: (!) 202.1 kg (445 lb 8.8 oz)  Body mass index is 60.43 kg/m².    Intake/Output - Last 3 Shifts         04/08 0700  04/09 0659 04/09 0700  04/10 0659 04/10 0700 04/11 0659    P.O. 360 240     Total Intake(mL/kg) 360 (1.8) 240 (1.2)     Urine (mL/kg/hr) 950 (0.2) 1425 (0.3) 300 (0.8)    Total Output 950 1425 300    Net -590 -1185 -300           Urine Occurrence 1 x              Physical Exam  Vitals and nursing note reviewed.   Constitutional:       General: He is not in acute distress.     Appearance: Normal appearance. He is not ill-appearing.   Eyes:      General: No scleral icterus.     Extraocular Movements: Extraocular movements intact.   Cardiovascular:      Rate and Rhythm: Normal rate.   Pulmonary:      Effort: Pulmonary effort is normal. No respiratory distress.   Abdominal:      General: There is  distension.      Palpations: Abdomen is soft.      Tenderness: There is no guarding or rebound.      Comments: Incision well approximated with staples. C/D/I. Appropriately tender to palpation.    Skin:     General: Skin is warm and dry.   Neurological:      General: No focal deficit present.      Mental Status: He is alert and oriented to person, place, and time.       Significant Labs:  I have reviewed all pertinent lab results within the past 24 hours.    Significant Diagnostics:  I have reviewed all pertinent imaging results/findings within the past 24 hours.

## 2023-04-11 VITALS
OXYGEN SATURATION: 95 % | DIASTOLIC BLOOD PRESSURE: 82 MMHG | SYSTOLIC BLOOD PRESSURE: 141 MMHG | RESPIRATION RATE: 18 BRPM | TEMPERATURE: 98 F | WEIGHT: 315 LBS | BODY MASS INDEX: 42.66 KG/M2 | HEIGHT: 72 IN | HEART RATE: 83 BPM

## 2023-04-11 LAB
ALBUMIN SERPL BCP-MCNC: 2.9 G/DL (ref 3.5–5.2)
ANION GAP SERPL CALC-SCNC: 12 MMOL/L (ref 8–16)
BASOPHILS # BLD AUTO: 0.02 K/UL (ref 0–0.2)
BASOPHILS NFR BLD: 0.4 % (ref 0–1.9)
BUN SERPL-MCNC: 14 MG/DL (ref 8–23)
CALCIUM SERPL-MCNC: 8.9 MG/DL (ref 8.7–10.5)
CHLORIDE SERPL-SCNC: 100 MMOL/L (ref 95–110)
CO2 SERPL-SCNC: 22 MMOL/L (ref 23–29)
CREAT SERPL-MCNC: 0.8 MG/DL (ref 0.5–1.4)
DIFFERENTIAL METHOD: ABNORMAL
EOSINOPHIL # BLD AUTO: 0.2 K/UL (ref 0–0.5)
EOSINOPHIL NFR BLD: 2.9 % (ref 0–8)
ERYTHROCYTE [DISTWIDTH] IN BLOOD BY AUTOMATED COUNT: 14.5 % (ref 11.5–14.5)
EST. GFR  (NO RACE VARIABLE): >60 ML/MIN/1.73 M^2
GLUCOSE SERPL-MCNC: 81 MG/DL (ref 70–110)
HCT VFR BLD AUTO: 31.2 % (ref 40–54)
HGB BLD-MCNC: 9.7 G/DL (ref 14–18)
IMM GRANULOCYTES # BLD AUTO: 0.02 K/UL (ref 0–0.04)
IMM GRANULOCYTES NFR BLD AUTO: 0.4 % (ref 0–0.5)
LYMPHOCYTES # BLD AUTO: 1 K/UL (ref 1–4.8)
LYMPHOCYTES NFR BLD: 18.2 % (ref 18–48)
MAGNESIUM SERPL-MCNC: 1.9 MG/DL (ref 1.6–2.6)
MCH RBC QN AUTO: 25.9 PG (ref 27–31)
MCHC RBC AUTO-ENTMCNC: 31.1 G/DL (ref 32–36)
MCV RBC AUTO: 83 FL (ref 82–98)
MONOCYTES # BLD AUTO: 0.5 K/UL (ref 0.3–1)
MONOCYTES NFR BLD: 8.1 % (ref 4–15)
NEUTROPHILS # BLD AUTO: 3.9 K/UL (ref 1.8–7.7)
NEUTROPHILS NFR BLD: 70 % (ref 38–73)
NRBC BLD-RTO: 0 /100 WBC
PHOSPHATE SERPL-MCNC: 3.7 MG/DL (ref 2.7–4.5)
PLATELET # BLD AUTO: 308 K/UL (ref 150–450)
PMV BLD AUTO: 8.4 FL (ref 9.2–12.9)
POTASSIUM SERPL-SCNC: 4.1 MMOL/L (ref 3.5–5.1)
RBC # BLD AUTO: 3.74 M/UL (ref 4.6–6.2)
SODIUM SERPL-SCNC: 134 MMOL/L (ref 136–145)
WBC # BLD AUTO: 5.54 K/UL (ref 3.9–12.7)

## 2023-04-11 PROCEDURE — 94761 N-INVAS EAR/PLS OXIMETRY MLT: CPT

## 2023-04-11 PROCEDURE — 99900035 HC TECH TIME PER 15 MIN (STAT)

## 2023-04-11 PROCEDURE — 25000003 PHARM REV CODE 250

## 2023-04-11 PROCEDURE — 80069 RENAL FUNCTION PANEL: CPT

## 2023-04-11 PROCEDURE — 85025 COMPLETE CBC W/AUTO DIFF WBC: CPT

## 2023-04-11 PROCEDURE — 83735 ASSAY OF MAGNESIUM: CPT

## 2023-04-11 PROCEDURE — 25000003 PHARM REV CODE 250: Performed by: SURGERY

## 2023-04-11 PROCEDURE — 36415 COLL VENOUS BLD VENIPUNCTURE: CPT

## 2023-04-11 RX ORDER — HYDROCODONE BITARTRATE AND ACETAMINOPHEN 5; 325 MG/1; MG/1
1 TABLET ORAL EVERY 4 HOURS PRN
Qty: 10 TABLET | Refills: 0 | Status: SHIPPED | OUTPATIENT
Start: 2023-04-11

## 2023-04-11 RX ORDER — ONDANSETRON HYDROCHLORIDE 8 MG/1
8 TABLET, FILM COATED ORAL EVERY 8 HOURS PRN
Qty: 20 TABLET | Refills: 1 | Status: SHIPPED | OUTPATIENT
Start: 2023-04-11

## 2023-04-11 RX ADMIN — AMLODIPINE BESYLATE 10 MG: 5 TABLET ORAL at 08:04

## 2023-04-11 RX ADMIN — ATORVASTATIN CALCIUM 10 MG: 10 TABLET, FILM COATED ORAL at 08:04

## 2023-04-11 RX ADMIN — ONDANSETRON 8 MG: 8 TABLET, ORALLY DISINTEGRATING ORAL at 01:04

## 2023-04-11 RX ADMIN — DOCUSATE SODIUM 100 MG: 100 CAPSULE, LIQUID FILLED ORAL at 08:04

## 2023-04-11 RX ADMIN — POLYETHYLENE GLYCOL 3350 17 G: 17 POWDER, FOR SOLUTION ORAL at 08:04

## 2023-04-11 RX ADMIN — ONDANSETRON 8 MG: 8 TABLET, ORALLY DISINTEGRATING ORAL at 05:04

## 2023-04-11 RX ADMIN — ASPIRIN 81 MG CHEWABLE TABLET 81 MG: 81 TABLET CHEWABLE at 08:04

## 2023-04-11 RX ADMIN — GUAIFENESIN 600 MG: 600 TABLET, EXTENDED RELEASE ORAL at 08:04

## 2023-04-11 NOTE — PROGRESS NOTES
[facility]  General Surgery  Progress Note    Subjective:     History of Present Illness:  No notes on file    Post-Op Info:  Procedure(s) (LRB):  LAPAROSCOPY, DIAGNOSTIC (N/A)  EXCISION, SMALL INTESTINE/SMALL BOWEL RESECTION (N/A)  LAPAROTOMY, EXPLORATORY (N/A)   7 Days Post-Op     Interval History: Patient had a small bowel movement overnight after ambulation with improvement in distention and abdominal pain, however vomited once over night. Continues to endorse flatus. VSS. Afebrile.     Medications:  Continuous Infusions:  Scheduled Meds:   amLODIPine  10 mg Oral Daily    aspirin  81 mg Oral Daily    atorvastatin  10 mg Oral Daily    docusate sodium  100 mg Oral BID    enoxaparin  40 mg Subcutaneous Daily    guaiFENesin  600 mg Oral BID    ondansetron  8 mg Oral Q8H    polyethylene glycol  17 g Oral BID     PRN Meds:acetaminophen, HYDROcodone-acetaminophen, melatonin, morphine, pneumoc 20-orin conj-dip cr(PF), promethazine     Review of patient's allergies indicates:  No Known Allergies  Objective:     Vital Signs (Most Recent):  Temp: 98.2 °F (36.8 °C) (04/11/23 0720)  Pulse: 74 (04/11/23 0751)  Resp: 18 (04/11/23 0720)  BP: 137/74 (04/11/23 0720)  SpO2: (!) 94 % (04/11/23 0751)   Vital Signs (24h Range):  Temp:  [97.6 °F (36.4 °C)-98.6 °F (37 °C)] 98.2 °F (36.8 °C)  Pulse:  [72-83] 74  Resp:  [17-20] 18  SpO2:  [94 %-97 %] 94 %  BP: (136-148)/(74-81) 137/74     Weight: (!) 202.1 kg (445 lb 8.8 oz)  Body mass index is 60.43 kg/m².    Intake/Output - Last 3 Shifts         04/09 0700  04/10 0659 04/10 0700  04/11 0659 04/11 0700 04/12 0659    P.O. 240      Total Intake(mL/kg) 240 (1.2)      Urine (mL/kg/hr) 1425 (0.3) 700 (0.1) 500 (0.7)    Total Output 1425 700 500    Net -1185 -700 -500           Urine Occurrence  250 x             Physical Exam  Vitals and nursing note reviewed.   Constitutional:       General: He is not in acute distress.     Appearance: Normal appearance. He is not  ill-appearing.   Eyes:      General: No scleral icterus.     Extraocular Movements: Extraocular movements intact.   Cardiovascular:      Rate and Rhythm: Normal rate.   Pulmonary:      Effort: Pulmonary effort is normal. No respiratory distress.   Abdominal:      General: There is distension (Improved from prior exam).      Palpations: Abdomen is soft.      Tenderness: There is no guarding or rebound.      Comments: Incision well approximated with staples. C/D/I. Appropriately tender to palpation.    Skin:     General: Skin is warm and dry.   Neurological:      General: No focal deficit present.      Mental Status: He is alert and oriented to person, place, and time.       Significant Labs:  I have reviewed all pertinent lab results within the past 24 hours.    Significant Diagnostics:  I have reviewed all pertinent imaging results/findings within the past 24 hours.    Assessment/Plan:     * Small bowel mass  Poli Young is a 67 y.o. male with biopsy proven small bowel adenocarcinoma now status post small bowel resection on 4/4/23. Given the interval development of N/V and abdominal distention without fevers or leukocytosis he likely has developed a post-operative ileus.     -Advance to regular diet  -DC mIVF  -Scheduled zofran with additional PRNs available  -Continue home anti-hypertensives, statin, and ASA  -Daily labs  -PT/OT  -DVT PPX: LVX    Dispo: Possibly home today pending tolerance of regular diet         Yrn Howell MD  General Surgery  [facility] S[facility]

## 2023-04-11 NOTE — PHYSICIAN QUERY
PT Name: Poli Young  MR #: [id number]    DOCUMENTATION CLARIFICATION     CDS: Jennifer HANCOCK RN  Contact information: [email or web address]  This form is a permanent document in the medical record.     Query Date: April 11, 2023    By submitting this query, we are merely seeking further clarification of documentation. Please utilize your independent clinical judgment when addressing the question(s) below.       The Medical Record contains the following:   Indicators   Supporting Clinical Findings Location in Medical Record   X Postoperative Ileus documented * Small bowel mass  Poli Young is a 67 y.o. male with biopsy proven small bowel adenocarcinoma now status post small bowel resection on 4/4/23. Given the interval development of N/V and abdominal distention without fevers or leukocytosis he likely has developed a post-operative ileus.  PN Gen Surg 4/6/2023   X Subjective/Objective GI assessment Interval History: Mr. Young states he vomited early this morning prior to AM rounds and this was proceeded by a bout of nausea. He is not hungry and endorses frequent belching. Apart from mild soreness following emesis, he otherwise feels well. He endorses flatulence, but no BM.     Interval History: Mr. Young reports ongoing issues with nausea and vomited once overnight. States he continues to pass flatus. Did not eat much of dinner given nausea.  PN Gen Surg 4/6/2023          PN Gen Surg 4/10/2023    Radiology Reports     X Surgical Procedure Procedure: Procedure(s) (LRB):  LAPAROSCOPY, DIAGNOSTIC (N/A)  EXCISION, SMALL INTESTINE, LAPAROSCOPIC (N/A)     Exploratory laparotomy Op Note 4/4/2023   X Treatment/Medication -NPO, ok for meds and ice chips  -mIVF while NPO  -PRN pain and anti-emetics PN Gen Surg 4/6/2023    Other           Please further specify the postoperative ileus:  [   ] Paralytic/Adynamic ileus, complication of surgery    [  x ] Paralytic/Adynamic ileus -- occurring in the post-operative  period but not a complication of the surgery    [   ] Postoperative Ileus ruled out; Expected delay of return of bowel function    [   ] Other clarification (please specify): _____________   [  ] Clinically Undetermined       Please document in your progress notes daily for the duration of treatment, until resolved, and include in your discharge summary.       Form No. [id number]

## 2023-04-11 NOTE — SUBJECTIVE & OBJECTIVE
Interval History: Patient had a small bowel movement overnight after ambulation with improvement in distention and abdominal pain, however vomited once over night. Continues to endorse flatus. VSS. Afebrile.     Medications:  Continuous Infusions:  Scheduled Meds:   amLODIPine  10 mg Oral Daily    aspirin  81 mg Oral Daily    atorvastatin  10 mg Oral Daily    docusate sodium  100 mg Oral BID    enoxaparin  40 mg Subcutaneous Daily    guaiFENesin  600 mg Oral BID    ondansetron  8 mg Oral Q8H    polyethylene glycol  17 g Oral BID     PRN Meds:acetaminophen, HYDROcodone-acetaminophen, melatonin, morphine, pneumoc 20-orin conj-dip cr(PF), promethazine     Review of patient's allergies indicates:  No Known Allergies  Objective:     Vital Signs (Most Recent):  Temp: 98.2 °F (36.8 °C) (04/11/23 0720)  Pulse: 74 (04/11/23 0751)  Resp: 18 (04/11/23 0720)  BP: 137/74 (04/11/23 0720)  SpO2: (!) 94 % (04/11/23 0751)   Vital Signs (24h Range):  Temp:  [97.6 °F (36.4 °C)-98.6 °F (37 °C)] 98.2 °F (36.8 °C)  Pulse:  [72-83] 74  Resp:  [17-20] 18  SpO2:  [94 %-97 %] 94 %  BP: (136-148)/(74-81) 137/74     Weight: (!) 202.1 kg (445 lb 8.8 oz)  Body mass index is 60.43 kg/m².    Intake/Output - Last 3 Shifts         04/09 0700  04/10 0659 04/10 0700  04/11 0659 04/11 0700 04/12 0659    P.O. 240      Total Intake(mL/kg) 240 (1.2)      Urine (mL/kg/hr) 1425 (0.3) 700 (0.1) 500 (0.7)    Total Output 1425 700 500    Net -1185 -700 -500           Urine Occurrence  250 x             Physical Exam  Vitals and nursing note reviewed.   Constitutional:       General: He is not in acute distress.     Appearance: Normal appearance. He is not ill-appearing.   Eyes:      General: No scleral icterus.     Extraocular Movements: Extraocular movements intact.   Cardiovascular:      Rate and Rhythm: Normal rate.   Pulmonary:      Effort: Pulmonary effort is normal. No respiratory distress.   Abdominal:      General: There is distension (Improved from  prior exam).      Palpations: Abdomen is soft.      Tenderness: There is no guarding or rebound.      Comments: Incision well approximated with staples. C/D/I. Appropriately tender to palpation.    Skin:     General: Skin is warm and dry.   Neurological:      General: No focal deficit present.      Mental Status: He is alert and oriented to person, place, and time.       Significant Labs:  I have reviewed all pertinent lab results within the past 24 hours.    Significant Diagnostics:  I have reviewed all pertinent imaging results/findings within the past 24 hours.

## 2023-04-11 NOTE — PLAN OF CARE
SW met with pt and pt's S.O. Janine [phone] at bedside to complete the final assessment. Pt will have Janine help transport him home today. Pt has f/u appts listed on avs. Rounds completed on pt. All questions addressed. Bedside nurse to discuss d/c medications. Discussed importance to attend all f/u appts and take medications as prescribed. Verbalized understanding. Case Management to sign off.     Juan Antonio Aren, MSSAMANTHA  [phone]    Future Appointments   Date Time Provider Department Center   4/18/2023  2:20 PM Tyrone Martínez MD [facility]   5/10/2023  1:40 PM Aamir Rushing MD [facility]        04/11/23 7290   Final Note   Assessment Type Final Discharge Note   Anticipated Discharge Disposition Home   What phone number can be called within the next 1-3 days to see how you are doing after discharge? [phone]   Hospital Resources/Appts/Education Provided Appointments scheduled and added to AVS   Post-Acute Status   Coverage PHN   Discharge Delays None known at this time       Initial Assessment (most recent)       Adult Discharge Assessment - 04/05/23 1052          Discharge Assessment    Assessment Type Discharge Planning Assessment     Confirmed/corrected address, phone number and insurance Yes     Confirmed Demographics Correct on Facesheet     Source of Information patient     When was your last doctors appointment? --   per pt 1 week    Communicated BARBARA with patient/caregiver Yes     Reason For Admission Small bowel mass     People in Home significant other     Facility Arrived From: home     Do you expect to return to your current living situation? Yes     Do you have help at home or someone to help you manage your care at home? Yes     Who are your caregiver(s) and their phone number(s)? S.O. Janine [phone]     Prior to hospitilization cognitive status: Alert/Oriented     Current cognitive status: Alert/Oriented     Home Accessibility wheelchair accessible      Home Layout Able to live on 1st floor     Equipment Currently Used at Home none     Readmission within 30 days? No     Patient currently being followed by outpatient case management? No     Do you currently have service(s) that help you manage your care at home? No     Do you take prescription medications? Yes     Do you have prescription coverage? Yes     Coverage PHN     Do you have any problems affording any of your prescribed medications? No     Is the patient taking medications as prescribed? yes     Who is going to help you get home at discharge? S.O. Janine [phone]     How do you get to doctors appointments? family or friend will provide;car, drives self     Are you on dialysis? No     Do you take coumadin? No     Discharge Plan A Home with family     DME Needed Upon Discharge  none     Discharge Plan discussed with: Patient     Discharge Barriers Identified None        OTHER    Name(s) of People in Home S.O. Janine [phone]

## 2023-04-11 NOTE — PROGRESS NOTES
[facility]                    Pharmacy       Discharge Medication Education    Patient ACCEPTED medication education. Pharmacy has provided education on the name, indication, and possible side effects of the medication(s) prescribed, using teach-back method.     The following medications have also been discussed, during this admission.        Medication List        START taking these medications      HYDROcodone-acetaminophen 5-325 mg per tablet  Commonly known as: NORCO  Take 1 tablet by mouth every 4 (four) hours as needed for Pain.     ondansetron 8 MG tablet  Commonly known as: ZOFRAN  Take 1 tablet (8 mg total) by mouth every 8 (eight) hours as needed for Nausea.            CONTINUE taking these medications      amLODIPine 10 MG tablet  Commonly known as: NORVASC     aspirin 81 MG EC tablet  Commonly known as: ECOTRIN     atorvastatin 10 MG tablet  Commonly known as: LIPITOR     fluticasone propionate 50 mcg/actuation nasal spray  Commonly known as: FLONASE     krill-om-3-dha-epa-phospho-ast 1,000-230-60 mg Cap     lisinopriL 2.5 MG tablet  Commonly known as: PRINIVIL,ZESTRIL     pantoprazole 40 MG tablet  Commonly known as: PROTONIX  Take 1 tablet (40 mg total) by mouth once daily.     sildenafiL 50 MG tablet  Commonly known as: VIAGRA               Where to Get Your Medications        These medications were sent to [facility]  [address] [place] [address]      Hours: Mon-Fri, 8a-5:30p Phone: [phone]   HYDROcodone-acetaminophen 5-325 mg per tablet  ondansetron 8 MG tablet          Thank you  Ingris Flores, PharmD  [phone]

## 2023-04-11 NOTE — ASSESSMENT & PLAN NOTE
Poli Young is a 67 y.o. male with biopsy proven small bowel adenocarcinoma now status post small bowel resection on 4/4/23. Given the interval development of N/V and abdominal distention without fevers or leukocytosis he likely has developed a post-operative ileus.     -Advance to regular diet  -DC mIVF  -Scheduled zofran with additional PRNs available  -Continue home anti-hypertensives, statin, and ASA  -Daily labs  -PT/OT  -DVT PPX: LVX    Dispo: Possibly home today pending tolerance of regular diet

## 2023-04-12 ENCOUNTER — PATIENT OUTREACH (OUTPATIENT)
Dept: ADMINISTRATIVE | Facility: OTHER | Age: 68
End: 2023-04-12
Payer: MEDICARE

## 2023-04-12 ENCOUNTER — NURSE TRIAGE (OUTPATIENT)
Dept: ADMINISTRATIVE | Facility: CLINIC | Age: 68
End: 2023-04-12
Payer: MEDICARE

## 2023-04-12 ENCOUNTER — PATIENT OUTREACH (OUTPATIENT)
Dept: ADMINISTRATIVE | Facility: CLINIC | Age: 68
End: 2023-04-12
Payer: MEDICARE

## 2023-04-12 ENCOUNTER — PATIENT MESSAGE (OUTPATIENT)
Dept: SURGERY | Facility: CLINIC | Age: 68
End: 2023-04-12
Payer: MEDICARE

## 2023-04-12 NOTE — PROGRESS NOTES
IP Liaison - Final Visit Note    Patient: Poli Young  MRN:  [id number]  Date of Service:  4/12/2023  Completed by:  ANASTASIA Yoder    Reason for Visit   Patient presents with    IP Liaison Chart Review        Patient Summary     Discharge Date: 4/11/2023  Discharge telephone number/address: [phone] / [address] [place] [address]  Follow up provider: Tyrone Martínez MD / Aamir Rushing MD  Follow up appointments: 4/18/2023 @ 2:20pm / 5/10/2023 @ 1:40pm  Home Health agency & telephone number: n/a  DME ordered &  name: n/a  Assigned OPCM RN/SW: n/a  Report sent to follow up team (PCP/OPCM) via in basket message: n/a  Community Resources arranged including agency name & contact info: n/a      ANASTASIA Yoder

## 2023-04-12 NOTE — TELEPHONE ENCOUNTER
Spoke with ms. Mehta ( SO) regarding pt . Pt had laparotomy 4/4. States pt has yellow to green colored crust to sutures. Denies fever. Advised will reach out to office regarding matter. Unable to reach anyone in office via . Advised will send message to office. Advised Pt can be seen in ED/UC Verbalized understanding.      Reason for Disposition   Caller has URGENT question and triager unable to answer question    Additional Information   Negative: Major abdominal surgical incision and wound gaping open with visible internal organs   Negative: Sounds like a life-threatening emergency to the triager   Negative: Bleeding from incision and won't stop after 10 minutes of direct pressure   Negative: Bleeding (more than a few drops) from incision and after blood vessel surgery (e.g., carotidendarterectomy, femoral bypass graft, kidney dialysis fistula, tracheostomy)   Negative: Bright red, wide-spread, sunburn-like rash   Negative: SEVERE pain in the incision   Negative: Incision gaping open and < 2 days (48 hours) since wound re-opened   Negative: Incision gaping open and length of opening > 2 inches (5 cm)   Negative: Patient sounds very sick or weak to the triager   Negative: Sounds like a serious complication to the triager   Negative: Fever > 100.4 F (38.0 C)   Negative: Incision looks infected (spreading redness, pain)   Negative: Red streak runs from the incision and longer than 1 inch (2.5 cm)   Negative: Pus or bad-smelling fluid draining from incision   Negative: Dressing soaked with blood or body fluid (e.g., drainage)   Negative: Raised bruise and size > 2 inches (5 cm) and expanding   Negative: Scant bleeding (e.g., few drops) from incision and after blood vessel surgery (e.g., carotid endarterectomy, femoral bypass graft, kidney dialysis fistula    Protocols used: Post-Op Incision Symptoms and [id number]

## 2023-04-12 NOTE — PROGRESS NOTES
C3 nurse spoke with Poli Young and caregiver for a TCC post hospital discharge follow up call. The patient reports does not have a scheduled HOSFU appointment. C3 nurse was unable to schedule HOSFU appointment for [facility] PCP. Patient advised to contact their PCP to schedule a HOSPFU within 5-7 days.

## 2023-04-12 NOTE — DISCHARGE SUMMARY
[facility] Surg  Short Stay  Discharge Summary    Admit Date: 4/4/2023    Discharge Date and Time: 4/11/2023  3:38 PM      Discharge Attending Physician: No att. providers found     Hospital Course (synopsis of major diagnoses, care, treatment, and services provided during the course of the hospital stay): 67M was brought in for elective small bowel resection. He underwent the procedure without complication. Post op his dieet was advanced but he began experiencing some distention, nausea. His electrolytes were  corrected and he improved. He began having bowel function and his diet was slowly advanced and he was discharged home.     Final Diagnoses:    Principal Problem: Small bowel mass   Secondary Diagnoses:   Active Hospital Problems    Diagnosis  POA    *Small bowel mass [K63.89]  Yes      Resolved Hospital Problems   No resolved problems to display.       Discharged Condition: stable    Disposition: Home or Self Care    Follow up/Patient Instructions:    Medications:  Reconciled Home Medications:      Medication List        START taking these medications      HYDROcodone-acetaminophen 5-325 mg per tablet  Commonly known as: NORCO  Take 1 tablet by mouth every 4 (four) hours as needed for Pain.     ondansetron 8 MG tablet  Commonly known as: ZOFRAN  Take 1 tablet (8 mg total) by mouth every 8 (eight) hours as needed for Nausea.            CONTINUE taking these medications      amLODIPine 10 MG tablet  Commonly known as: NORVASC  Take 10 mg by mouth once daily.     aspirin 81 MG EC tablet  Commonly known as: ECOTRIN  Take 81 mg by mouth.     atorvastatin 10 MG tablet  Commonly known as: LIPITOR  Take 10 mg by mouth once daily.     fluticasone propionate 50 mcg/actuation nasal spray  Commonly known as: FLONASE  1 spray by Each Nostril route once daily.     krill-om-3-dha-epa-phospho-ast 1,000-230-60 mg Cap  MegaRed Omega-3 Krill Oil 1,000 mg-230 mg-60 mg capsule   Take 1 capsule by oral route.     lisinopriL 2.5  MG tablet  Commonly known as: PRINIVIL,ZESTRIL  Take 2.5 mg by mouth once daily.     pantoprazole 40 MG tablet  Commonly known as: PROTONIX  Take 1 tablet (40 mg total) by mouth once daily.     sildenafiL 50 MG tablet  Commonly known as: VIAGRA  Take 50 mg by mouth daily as needed.            No discharge procedures on file.   Follow-up Information       Tyrone Martínez MD Follow up in 1 week(s).    Specialties: Surgery, General Surgery  Contact information:  [address]  [address]  [facility] [address] [phone]

## 2023-04-18 ENCOUNTER — OFFICE VISIT (OUTPATIENT)
Dept: SURGERY | Facility: CLINIC | Age: 68
End: 2023-04-18
Payer: MEDICARE

## 2023-04-18 VITALS
WEIGHT: 181.13 LBS | HEART RATE: 91 BPM | HEIGHT: 72 IN | SYSTOLIC BLOOD PRESSURE: 115 MMHG | BODY MASS INDEX: 24.53 KG/M2 | DIASTOLIC BLOOD PRESSURE: 74 MMHG

## 2023-04-18 DIAGNOSIS — K63.89 SMALL BOWEL MASS: Primary | ICD-10-CM

## 2023-04-18 LAB
FINAL PATHOLOGIC DIAGNOSIS: NORMAL
GROSS: NORMAL
Lab: NORMAL
MICROSCOPIC EXAM: NORMAL

## 2023-04-18 PROCEDURE — 1159F MED LIST DOCD IN RCRD: CPT | Mod: CPTII,S$GLB,, | Performed by: STUDENT IN AN ORGANIZED HEALTH CARE EDUCATION/TRAINING PROGRAM

## 2023-04-18 PROCEDURE — 99024 PR POST-OP FOLLOW-UP VISIT: ICD-10-PCS | Mod: S$GLB,,, | Performed by: STUDENT IN AN ORGANIZED HEALTH CARE EDUCATION/TRAINING PROGRAM

## 2023-04-18 PROCEDURE — 3288F PR FALLS RISK ASSESSMENT DOCUMENTED: ICD-10-PCS | Mod: CPTII,S$GLB,, | Performed by: STUDENT IN AN ORGANIZED HEALTH CARE EDUCATION/TRAINING PROGRAM

## 2023-04-18 PROCEDURE — 99024 POSTOP FOLLOW-UP VISIT: CPT | Mod: S$GLB,,, | Performed by: STUDENT IN AN ORGANIZED HEALTH CARE EDUCATION/TRAINING PROGRAM

## 2023-04-18 PROCEDURE — 4010F PR ACE/ARB THEARPY RXD/TAKEN: ICD-10-PCS | Mod: CPTII,S$GLB,, | Performed by: STUDENT IN AN ORGANIZED HEALTH CARE EDUCATION/TRAINING PROGRAM

## 2023-04-18 PROCEDURE — 3078F DIAST BP <80 MM HG: CPT | Mod: CPTII,S$GLB,, | Performed by: STUDENT IN AN ORGANIZED HEALTH CARE EDUCATION/TRAINING PROGRAM

## 2023-04-18 PROCEDURE — 3008F BODY MASS INDEX DOCD: CPT | Mod: CPTII,S$GLB,, | Performed by: STUDENT IN AN ORGANIZED HEALTH CARE EDUCATION/TRAINING PROGRAM

## 2023-04-18 PROCEDURE — 1126F PR PAIN SEVERITY QUANTIFIED, NO PAIN PRESENT: ICD-10-PCS | Mod: CPTII,S$GLB,, | Performed by: STUDENT IN AN ORGANIZED HEALTH CARE EDUCATION/TRAINING PROGRAM

## 2023-04-18 PROCEDURE — 1126F AMNT PAIN NOTED NONE PRSNT: CPT | Mod: CPTII,S$GLB,, | Performed by: STUDENT IN AN ORGANIZED HEALTH CARE EDUCATION/TRAINING PROGRAM

## 2023-04-18 PROCEDURE — 4010F ACE/ARB THERAPY RXD/TAKEN: CPT | Mod: CPTII,S$GLB,, | Performed by: STUDENT IN AN ORGANIZED HEALTH CARE EDUCATION/TRAINING PROGRAM

## 2023-04-18 PROCEDURE — 99999 PR PBB SHADOW E&M-EST. PATIENT-LVL III: CPT | Mod: PBBFAC,,, | Performed by: STUDENT IN AN ORGANIZED HEALTH CARE EDUCATION/TRAINING PROGRAM

## 2023-04-18 PROCEDURE — 3074F SYST BP LT 130 MM HG: CPT | Mod: CPTII,S$GLB,, | Performed by: STUDENT IN AN ORGANIZED HEALTH CARE EDUCATION/TRAINING PROGRAM

## 2023-04-18 PROCEDURE — 3288F FALL RISK ASSESSMENT DOCD: CPT | Mod: CPTII,S$GLB,, | Performed by: STUDENT IN AN ORGANIZED HEALTH CARE EDUCATION/TRAINING PROGRAM

## 2023-04-18 PROCEDURE — 1101F PT FALLS ASSESS-DOCD LE1/YR: CPT | Mod: CPTII,S$GLB,, | Performed by: STUDENT IN AN ORGANIZED HEALTH CARE EDUCATION/TRAINING PROGRAM

## 2023-04-18 PROCEDURE — 1101F PR PT FALLS ASSESS DOC 0-1 FALLS W/OUT INJ PAST YR: ICD-10-PCS | Mod: CPTII,S$GLB,, | Performed by: STUDENT IN AN ORGANIZED HEALTH CARE EDUCATION/TRAINING PROGRAM

## 2023-04-18 PROCEDURE — 3078F PR MOST RECENT DIASTOLIC BLOOD PRESSURE < 80 MM HG: ICD-10-PCS | Mod: CPTII,S$GLB,, | Performed by: STUDENT IN AN ORGANIZED HEALTH CARE EDUCATION/TRAINING PROGRAM

## 2023-04-18 PROCEDURE — 1159F PR MEDICATION LIST DOCUMENTED IN MEDICAL RECORD: ICD-10-PCS | Mod: CPTII,S$GLB,, | Performed by: STUDENT IN AN ORGANIZED HEALTH CARE EDUCATION/TRAINING PROGRAM

## 2023-04-18 PROCEDURE — 3074F PR MOST RECENT SYSTOLIC BLOOD PRESSURE < 130 MM HG: ICD-10-PCS | Mod: CPTII,S$GLB,, | Performed by: STUDENT IN AN ORGANIZED HEALTH CARE EDUCATION/TRAINING PROGRAM

## 2023-04-18 PROCEDURE — 3008F PR BODY MASS INDEX (BMI) DOCUMENTED: ICD-10-PCS | Mod: CPTII,S$GLB,, | Performed by: STUDENT IN AN ORGANIZED HEALTH CARE EDUCATION/TRAINING PROGRAM

## 2023-04-18 PROCEDURE — 99999 PR PBB SHADOW E&M-EST. PATIENT-LVL III: ICD-10-PCS | Mod: PBBFAC,,, | Performed by: STUDENT IN AN ORGANIZED HEALTH CARE EDUCATION/TRAINING PROGRAM

## 2023-04-18 NOTE — PROGRESS NOTES
[facility]  Post Op Visit    SUBJECTIVE:  Poli Young is a 67 y.o. male who presents to clinic today for post op follow up after small bowel resection on 4/4/23. He  denies pain, fevers, chills, nausea, vomiting, diarrhea, constipation, or hematochezia and is returning to their usual activity level. He is tolerating diet with normal appetite and bowel function and denies redness around or drainage from incisions. He did have an ED visit for bloody drainage from his incision that was managed with the application of dry gauze.    OBJECTIVE:  Vitals:    04/18/23 1342   BP: 115/74   Pulse: 91     Body mass index is 24.56 kg/m².    General: male in NAD. Not toxic appearing.   HENT: EOM intact.   Pulmonary: No respiratory distress. Effort normal.  Cardiovascular: Regular rate.   Abdomen: soft, non-tender, non-distended  Skin: Incisions are healing well without signs of infection. Staples removed in office without complication. No erythema, drainage, or increased warmth noted.       Path: In Process     ASSESSMENT/PLAN:    Poli Young is a 67 y.o. y/o male who presents to clinic today for f/u s/p small bowel resection on 4/4/23. Clinically improving and meeting all post op milestones     Patient is advised to avoid heavy lifting or strenuous activity for another 4 weeks.   Path reviewed  Follow-up in 2 weeks for path review  Call clinic with any questions or concerns  ED precautions given.   All questions answered; patient is comfortable with the above follow-up plan and verbalized understanding.

## 2023-04-19 ENCOUNTER — TELEPHONE (OUTPATIENT)
Dept: HEMATOLOGY/ONCOLOGY | Facility: CLINIC | Age: 68
End: 2023-04-19
Payer: MEDICARE

## 2023-04-19 DIAGNOSIS — K63.89 SMALL BOWEL MASS: Primary | ICD-10-CM

## 2023-04-20 ENCOUNTER — TELEPHONE (OUTPATIENT)
Dept: HEMATOLOGY/ONCOLOGY | Facility: CLINIC | Age: 68
End: 2023-04-20
Payer: MEDICARE

## 2023-04-20 NOTE — PHYSICIAN QUERY
PT Name: Poli Young  MR #: [id number]    DOCUMENTATION CLARIFICATION     CDS: Jennifer HANCOCK RN  Contact information: [email or web address]  This form is a permanent document in the medical record.     Query Date: April 20, 2023    By submitting this query, we are merely seeking further clarification of documentation.  Please utilize your independent clinical judgment when addressing the question(s) below.    The medical record contains the following:  Pathology Findings Location in Medical Record   - 17 lymph nodes, 3 positive for metastatic carcinoma (3/17)    Regional lymph node status:  Tumor present in regional lymph nodes   Number of lymph nodes with tumor:  3   Number of lymph nodes examined:  17 Surgical Pathology 4/4/2023    Surgical Pathology 4/4/2023       Please clarify the pathology findings.  [ x ] Pathology findings noted above are ruled in/confirmed as diagnoses   [  ] Pathology findings noted above are not confirmed as diagnoses   [  ] Pathology findings noted above are incidental   [  ] Other diagnosis (please specify): ___________   [  ] Clinically Undetermined     Please document in your progress notes daily for the duration of treatment until resolved and include in your discharge summary.    Form No. [id number]

## 2023-04-20 NOTE — TELEPHONE ENCOUNTER
TC attempted to patient to schedule an appointment from a referral.  Phone not working at this time    Again attempted pt's number  Phone not working at this time    Tc to Yasmin Soto significant otherTC attempted to friend to schedule an appointment from a referral.  No response  message left on VM for pt  or friend to contact office for assistance w/ scheduling an appointment       TC to Lissett Emerson sister to attempt   to schedule an appointment from a referral.  For her brother or to ask if she can reach him for our office  No response  message left on VM for  sister or patient pt to contact office for assistance w/ scheduling an appointment     Good morning  I am Margaret from the [employer] office  I have made 4 phone call attempts to contact him but have had no response  Today I will mail out a letter. Hopefully he will respond at that time  Thank you Margaret kwan RN    Letter mailed to pt on this date to contact office for an appointmnet

## 2023-04-24 ENCOUNTER — TELEPHONE (OUTPATIENT)
Dept: HEMATOLOGY/ONCOLOGY | Facility: CLINIC | Age: 68
End: 2023-04-24
Payer: MEDICARE

## 2023-04-26 ENCOUNTER — LAB VISIT (OUTPATIENT)
Dept: LAB | Facility: HOSPITAL | Age: 68
End: 2023-04-26
Attending: INTERNAL MEDICINE
Payer: MEDICARE

## 2023-04-26 ENCOUNTER — OFFICE VISIT (OUTPATIENT)
Dept: HEMATOLOGY/ONCOLOGY | Facility: CLINIC | Age: 68
End: 2023-04-26
Payer: MEDICARE

## 2023-04-26 VITALS
SYSTOLIC BLOOD PRESSURE: 146 MMHG | WEIGHT: 186.94 LBS | HEART RATE: 88 BPM | BODY MASS INDEX: 25.32 KG/M2 | DIASTOLIC BLOOD PRESSURE: 85 MMHG | OXYGEN SATURATION: 98 % | HEIGHT: 72 IN

## 2023-04-26 DIAGNOSIS — I10 HYPERTENSION, UNSPECIFIED TYPE: ICD-10-CM

## 2023-04-26 DIAGNOSIS — D64.9 ANEMIA, UNSPECIFIED TYPE: ICD-10-CM

## 2023-04-26 DIAGNOSIS — K63.89 SMALL BOWEL MASS: ICD-10-CM

## 2023-04-26 DIAGNOSIS — C17.9 ADENOCARCINOMA OF SMALL BOWEL: Primary | ICD-10-CM

## 2023-04-26 DIAGNOSIS — C17.9 ADENOCARCINOMA OF SMALL BOWEL: ICD-10-CM

## 2023-04-26 LAB
ALBUMIN SERPL BCP-MCNC: 3.7 G/DL (ref 3.5–5.2)
ALP SERPL-CCNC: 82 U/L (ref 55–135)
ALT SERPL W/O P-5'-P-CCNC: 24 U/L (ref 10–44)
ANION GAP SERPL CALC-SCNC: 6 MMOL/L (ref 8–16)
AST SERPL-CCNC: 18 U/L (ref 10–40)
BASOPHILS # BLD AUTO: 0.03 K/UL (ref 0–0.2)
BASOPHILS NFR BLD: 0.8 % (ref 0–1.9)
BILIRUB SERPL-MCNC: 0.2 MG/DL (ref 0.1–1)
BUN SERPL-MCNC: 16 MG/DL (ref 8–23)
CALCIUM SERPL-MCNC: 9.5 MG/DL (ref 8.7–10.5)
CEA SERPL-MCNC: 2.6 NG/ML (ref 0–5)
CHLORIDE SERPL-SCNC: 107 MMOL/L (ref 95–110)
CO2 SERPL-SCNC: 26 MMOL/L (ref 23–29)
CREAT SERPL-MCNC: 1 MG/DL (ref 0.5–1.4)
DIFFERENTIAL METHOD: ABNORMAL
EOSINOPHIL # BLD AUTO: 0.1 K/UL (ref 0–0.5)
EOSINOPHIL NFR BLD: 2 % (ref 0–8)
ERYTHROCYTE [DISTWIDTH] IN BLOOD BY AUTOMATED COUNT: 15.6 % (ref 11.5–14.5)
EST. GFR  (NO RACE VARIABLE): >60 ML/MIN/1.73 M^2
FERRITIN SERPL-MCNC: 22 NG/ML (ref 20–300)
GLUCOSE SERPL-MCNC: 104 MG/DL (ref 70–110)
HCT VFR BLD AUTO: 33.7 % (ref 40–54)
HGB BLD-MCNC: 10.5 G/DL (ref 14–18)
IMM GRANULOCYTES # BLD AUTO: 0.01 K/UL (ref 0–0.04)
IMM GRANULOCYTES NFR BLD AUTO: 0.3 % (ref 0–0.5)
IRON SERPL-MCNC: 26 UG/DL (ref 45–160)
LYMPHOCYTES # BLD AUTO: 1.2 K/UL (ref 1–4.8)
LYMPHOCYTES NFR BLD: 34.4 % (ref 18–48)
MAGNESIUM SERPL-MCNC: 1.9 MG/DL (ref 1.6–2.6)
MCH RBC QN AUTO: 26.2 PG (ref 27–31)
MCHC RBC AUTO-ENTMCNC: 31.2 G/DL (ref 32–36)
MCV RBC AUTO: 84 FL (ref 82–98)
MONOCYTES # BLD AUTO: 0.2 K/UL (ref 0.3–1)
MONOCYTES NFR BLD: 6.4 % (ref 4–15)
NEUTROPHILS # BLD AUTO: 2 K/UL (ref 1.8–7.7)
NEUTROPHILS NFR BLD: 56.1 % (ref 38–73)
NRBC BLD-RTO: 0 /100 WBC
PLATELET # BLD AUTO: 322 K/UL (ref 150–450)
PMV BLD AUTO: 7.6 FL (ref 9.2–12.9)
POTASSIUM SERPL-SCNC: 4.8 MMOL/L (ref 3.5–5.1)
PROT SERPL-MCNC: 7.6 G/DL (ref 6–8.4)
RBC # BLD AUTO: 4.01 M/UL (ref 4.6–6.2)
SATURATED IRON: 6 % (ref 20–50)
SODIUM SERPL-SCNC: 139 MMOL/L (ref 136–145)
TOTAL IRON BINDING CAPACITY: 443 UG/DL (ref 250–450)
TRANSFERRIN SERPL-MCNC: 299 MG/DL (ref 200–375)
WBC # BLD AUTO: 3.58 K/UL (ref 3.9–12.7)

## 2023-04-26 PROCEDURE — 99205 PR OFFICE/OUTPT VISIT, NEW, LEVL V, 60-74 MIN: ICD-10-PCS | Mod: S$GLB,,, | Performed by: INTERNAL MEDICINE

## 2023-04-26 PROCEDURE — 3008F PR BODY MASS INDEX (BMI) DOCUMENTED: ICD-10-PCS | Mod: CPTII,S$GLB,, | Performed by: INTERNAL MEDICINE

## 2023-04-26 PROCEDURE — 84466 ASSAY OF TRANSFERRIN: CPT | Performed by: INTERNAL MEDICINE

## 2023-04-26 PROCEDURE — 36415 COLL VENOUS BLD VENIPUNCTURE: CPT | Performed by: INTERNAL MEDICINE

## 2023-04-26 PROCEDURE — 4010F PR ACE/ARB THEARPY RXD/TAKEN: ICD-10-PCS | Mod: CPTII,S$GLB,, | Performed by: INTERNAL MEDICINE

## 2023-04-26 PROCEDURE — 85025 COMPLETE CBC W/AUTO DIFF WBC: CPT | Performed by: INTERNAL MEDICINE

## 2023-04-26 PROCEDURE — 1126F AMNT PAIN NOTED NONE PRSNT: CPT | Mod: CPTII,S$GLB,, | Performed by: INTERNAL MEDICINE

## 2023-04-26 PROCEDURE — 80053 COMPREHEN METABOLIC PANEL: CPT | Performed by: INTERNAL MEDICINE

## 2023-04-26 PROCEDURE — 83735 ASSAY OF MAGNESIUM: CPT | Performed by: INTERNAL MEDICINE

## 2023-04-26 PROCEDURE — 82728 ASSAY OF FERRITIN: CPT | Performed by: INTERNAL MEDICINE

## 2023-04-26 PROCEDURE — 1111F DSCHRG MED/CURRENT MED MERGE: CPT | Mod: CPTII,S$GLB,, | Performed by: INTERNAL MEDICINE

## 2023-04-26 PROCEDURE — 99999 PR PBB SHADOW E&M-EST. PATIENT-LVL IV: CPT | Mod: PBBFAC,,, | Performed by: INTERNAL MEDICINE

## 2023-04-26 PROCEDURE — 3079F DIAST BP 80-89 MM HG: CPT | Mod: CPTII,S$GLB,, | Performed by: INTERNAL MEDICINE

## 2023-04-26 PROCEDURE — 3288F FALL RISK ASSESSMENT DOCD: CPT | Mod: CPTII,S$GLB,, | Performed by: INTERNAL MEDICINE

## 2023-04-26 PROCEDURE — 3288F PR FALLS RISK ASSESSMENT DOCUMENTED: ICD-10-PCS | Mod: CPTII,S$GLB,, | Performed by: INTERNAL MEDICINE

## 2023-04-26 PROCEDURE — 3077F SYST BP >= 140 MM HG: CPT | Mod: CPTII,S$GLB,, | Performed by: INTERNAL MEDICINE

## 2023-04-26 PROCEDURE — 1126F PR PAIN SEVERITY QUANTIFIED, NO PAIN PRESENT: ICD-10-PCS | Mod: CPTII,S$GLB,, | Performed by: INTERNAL MEDICINE

## 2023-04-26 PROCEDURE — 4010F ACE/ARB THERAPY RXD/TAKEN: CPT | Mod: CPTII,S$GLB,, | Performed by: INTERNAL MEDICINE

## 2023-04-26 PROCEDURE — 3077F PR MOST RECENT SYSTOLIC BLOOD PRESSURE >= 140 MM HG: ICD-10-PCS | Mod: CPTII,S$GLB,, | Performed by: INTERNAL MEDICINE

## 2023-04-26 PROCEDURE — 82378 CARCINOEMBRYONIC ANTIGEN: CPT | Performed by: INTERNAL MEDICINE

## 2023-04-26 PROCEDURE — 1101F PR PT FALLS ASSESS DOC 0-1 FALLS W/OUT INJ PAST YR: ICD-10-PCS | Mod: CPTII,S$GLB,, | Performed by: INTERNAL MEDICINE

## 2023-04-26 PROCEDURE — 1101F PT FALLS ASSESS-DOCD LE1/YR: CPT | Mod: CPTII,S$GLB,, | Performed by: INTERNAL MEDICINE

## 2023-04-26 PROCEDURE — 99999 PR PBB SHADOW E&M-EST. PATIENT-LVL IV: ICD-10-PCS | Mod: PBBFAC,,, | Performed by: INTERNAL MEDICINE

## 2023-04-26 PROCEDURE — 99205 OFFICE O/P NEW HI 60 MIN: CPT | Mod: S$GLB,,, | Performed by: INTERNAL MEDICINE

## 2023-04-26 PROCEDURE — 1111F PR DISCHARGE MEDS RECONCILED W/ CURRENT OUTPATIENT MED LIST: ICD-10-PCS | Mod: CPTII,S$GLB,, | Performed by: INTERNAL MEDICINE

## 2023-04-26 PROCEDURE — 3079F PR MOST RECENT DIASTOLIC BLOOD PRESSURE 80-89 MM HG: ICD-10-PCS | Mod: CPTII,S$GLB,, | Performed by: INTERNAL MEDICINE

## 2023-04-26 PROCEDURE — 3008F BODY MASS INDEX DOCD: CPT | Mod: CPTII,S$GLB,, | Performed by: INTERNAL MEDICINE

## 2023-04-26 NOTE — Clinical Note
Cbc,Fe studies, CEA, cmp , pharmacogenomics, TEMPUS TODAY CT chest this week or early next week Schedule Chemo teaching Class in 2weeks Chemo start in 2weeks F/u in 1mo with cbc,cmp, prior to f/u

## 2023-04-26 NOTE — PROGRESS NOTES
Subjective     Patient ID: Poli Young is a 67 y.o. male.    Chief Complaint: No chief complaint on file.  Reason For Consultation: Small bowel adenocarcinoma  HPI\  67M underwent work up for anemia and was found to have a small bowel mass about 100cm from IC valve by enteroscopy by Dr Cruz. Got one blood transfusion. He reports history of left central intermittent abdominal pain for several months. No NV. Was found to be anemic on bloodwork. EGD in June 2022 did not show any obvious cause of bleeding.  Capsule endoscopy in January showed blood in small bowel. Enteroscopy showed mass in March that was biopsied and tattooed. Biopsy positive for adenocarcinoma. He is status post small bowel resection on 4/4/23. Pathology positive for  Invasive adenocarcinoma with partial mucinous component (40% of tumor) Lymphovascular invasion is present   17 lymph nodes, 3 positive for metastatic carcinoma (3/17) .Resection margins free of tumor  Pathologic stage pT3 pN Category:  pN2 . Today , he is doing well. Denies dizziness, weakness. No CP, SOB.Appetite improving postop.CT a/p w/contrast 4/13/23 shows Postoperative changes from partial small-bowel resection for a small bowel tumor . No evidence of distant metastases.Accompanied by his wife. He is here for further evaluation.       Sochx: Retired . Never smoked. Drinks etoh occasionally.   .       Past Medical History:   Diagnosis Date    Hyperlipemia     Hypertension        Past Surgical History:   Procedure Laterality Date    DIAGNOSTIC LAPAROSCOPY N/A 4/4/2023    Procedure: LAPAROSCOPY, DIAGNOSTIC;  Surgeon: Tyrone Martínez MD;  Location: [facility] OR;  Service: General;  Laterality: N/A;    ESOPHAGOGASTRODUODENOSCOPY N/A 6/8/2022    Procedure: EGD (ESOPHAGOGASTRODUODENOSCOPY);  Surgeon: Aamir Rushing MD;  Location: [facility];  Service: Endoscopy;  Laterality: N/A;    EXCISION, SMALL INTESTINE N/A 4/4/2023    Procedure: EXCISION, SMALL INTESTINE/SMALL  BOWEL RESECTION;  Surgeon: Tyrone Martínez MD;  Location: [facility] OR;  Service: General;  Laterality: N/A;    INTRALUMINAL GASTROINTESTINAL TRACT IMAGING VIA CAPSULE N/A 1/20/2023    Procedure: IMAGING PROCEDURE, GI TRACT, INTRALUMINAL, VIA CAPSULE;  Surgeon: Aamir Rushing MD;  Location: [facility] ENDO;  Service: Endoscopy;  Laterality: N/A;    LAPAROTOMY, EXPLORATORY N/A 4/4/2023    Procedure: LAPAROTOMY, EXPLORATORY;  Surgeon: Tyrone Martínez MD;  Location: [facility] OR;  Service: General;  Laterality: N/A;    SMALL BOWEL ENTEROSCOPY N/A 3/20/2023    Procedure: ENTEROSCOPY Upper DBE;  Surgeon: Giancarlo Cruz MD;  Location: [facility] ENDO;  Service: Endoscopy;  Laterality: N/A;        Social History     Tobacco Use    Smoking status: Never    Smokeless tobacco: Never   Substance Use Topics    Alcohol use: Yes     Comment: socially    Drug use: Never        Review of Systems   Constitutional:  Positive for appetite change (improved). Negative for fatigue, fever and unexpected weight change.   HENT:  Negative for mouth sores.    Eyes:  Negative for visual disturbance.   Respiratory:  Negative for cough and shortness of breath.    Cardiovascular:  Negative for chest pain.   Gastrointestinal:  Negative for abdominal pain and diarrhea.   Genitourinary:  Negative for frequency.   Musculoskeletal:  Negative for back pain.   Integumentary:  Negative for rash.   Neurological:  Negative for headaches.   Hematological:  Negative for adenopathy.   Psychiatric/Behavioral:  The patient is not nervous/anxious.         Objective   Vitals:    04/26/23 0905   BP: (!) 146/85   BP Location: Right arm   Patient Position: Sitting   BP Method: Large (Automatic)   Pulse: 88   SpO2: 98%   Weight: 84.8 kg (186 lb 15.2 oz)   Height: 6' (1.829 m)       Physical Exam  Constitutional:       Appearance: He is well-developed.   HENT:      Head: Normocephalic.      Mouth/Throat:      Pharynx: No oropharyngeal exudate.   Eyes:      General: No scleral  icterus.        Right eye: No discharge.         Left eye: No discharge.      Conjunctiva/sclera: Conjunctivae normal.   Neck:      Thyroid: No thyromegaly.   Cardiovascular:      Rate and Rhythm: Normal rate and regular rhythm.      Heart sounds: Normal heart sounds. No murmur heard.  Pulmonary:      Effort: Pulmonary effort is normal.      Breath sounds: Normal breath sounds. No wheezing or rales.   Abdominal:      General: Bowel sounds are normal.      Palpations: Abdomen is soft.      Tenderness: There is no abdominal tenderness. There is no guarding or rebound.   Musculoskeletal:         General: No swelling. Normal range of motion.      Cervical back: Normal range of motion and neck supple.   Lymphadenopathy:      Cervical: No cervical adenopathy.      Upper Body:      Right upper body: No supraclavicular adenopathy.      Left upper body: No supraclavicular adenopathy.   Skin:     Findings: No erythema or rash.   Neurological:      Mental Status: He is alert and oriented to person, place, and time.      Cranial Nerves: No cranial nerve deficit.   Psychiatric:         Mood and Affect: Mood normal.         Behavior: Behavior normal.            Pathology 4/4/23  Small intestine, segmental resection:   - Invasive adenocarcinoma with partial mucinous component (40% of tumor)       - Tumor invades through muscularis propria into subserosa   - Lymphovascular invasion is present   - 17 lymph nodes, 3 positive for metastatic carcinoma (3/17)   - Resection margins free of tumor   - Background small intestinal mucosa without significant histopathologic alteration   - See CAP Tumor synoptic and comment     CAP Tumor Synoptic:   Procedure:  Segmental resection   Tumor site:  Small intestine, not otherwise specified   Histologic type:  Adenocarcinoma (not otherwise characterized), with mucinous component comprising approximately 40% of tumor   Histologic grade:  G2, moderately differentiated   Tumor size:  5.5 cm in  greatest dimension   Tumor extent:  Invades through muscularis propria into subserosa without serosal penetration   Lymphovascular invasion:  Present   Macroscopic tumor perforation:  Not identified   Margin status for invasive carcinoma:  All margins negative for invasive carcinoma   Margin status for dysplasia:  All margins negative for carcinoma in situ (high-grade dysplasia)/ adenoma   Regional lymph node status:  Tumor present in regional lymph nodes   Number of lymph nodes with tumor:  3   Number of lymph nodes examined:  17   PATHOLOGIC STAGE CLASSIFICATION   TNM descriptors:  Not applicable   pT Category:  pT3   pN Category:  pN2   Special studies:  Intact expression of DNA mismatch repair proteins (MLH1, MSH2, MSH6, PMS2) in tumor by immunohistochemistry (performed on prior biopsy KES-, collected 3/20/2023); interpreted by Dr. Garcias)   Comment:  Records from this patient's reported recent colonoscopy are not available for review.       CT a/p w/contrast 4/13/23  Impression:     Postoperative changes from partial small-bowel resection for a small bowel tumor.  Pathology results are pending.     Skin thickening and subcutaneous edema near the umbilicus, which could be inflammatory or infectious.  No organized fluid collections along the surgical incision.     Dilated small bowel loops with gradual tapering distally, likely ileus in the early postoperative setting.     Small volume ascites and trace pneumoperitoneum, likely postoperative.  Anastomotic leak is less likely.  No organized intraperitoneal collections.     Other findings as described.      Lab Results   Component Value Date    WBC 5.01 04/13/2023    HGB 10.2 (L) 04/13/2023    HCT 32.0 (L) 04/13/2023    MCV 83 04/13/2023     04/13/2023             Assessment and Plan     Problem List Items Addressed This Visit          Oncology    Adenocarcinoma of small bowel - Primary  Poli Young is a 67 y.o. male with biopsy proven small  bowel adenocarcinoma now status post small bowel resection on 4/4/23.pT3 pN2M0  CT a/p no evidence of distant mets  Plan to complete staging  Plan PAC placement for long term IV access   Adjuvant chemotherapy for 6 mo is recommended for patients with node-positive, completely resected disease. The regimens of choice are CAPOX (capecitabine and oxaliplatin) and FOLFOX (folinic acid, fluorouracil, and oxaliplatin).  Plan mFOLFOX chemotherapy   Rx provided for odansteron  Cbc,Fe studies, CEA, cmp , pharmacogenomics, TEMPUS TODAY  CT chest this week or early next week  Schedule Chemo teaching Class in 2weeks  Chemo start in 2weeks    Relevant Orders    CBC Auto Differential    Comprehensive Metabolic Panel    Magnesium    CEA    PHARMACOGENOMICS PANEL    Consult to Pharmacogenomics    Tumor NGS Tissue    Tumor NGS Blood    Tumor NGS Blood Add-on (Deselect if not ordering Tumor NGS Blood)    CT Chest Without Contrast    Anemia    Relevant Orders    Ferritin    Iron and TIBC       GI    Small bowel mass     Other Visit Diagnoses       Hypertension, unspecified type                         Rx provided for odansteron  Cbc,Fe studies, CEA, cmp , pharmacogenomics, TEMPUS TODAY  CT chest this week or early next week  Schedule Chemo teaching Class in 2weeks  Chemo start in 2weeks  F/u in 1mo with cbc,cmp, prior to f/u

## 2023-04-27 ENCOUNTER — TELEPHONE (OUTPATIENT)
Dept: HEMATOLOGY/ONCOLOGY | Facility: CLINIC | Age: 68
End: 2023-04-27
Payer: MEDICARE

## 2023-04-27 DIAGNOSIS — C17.9 ADENOCARCINOMA OF SMALL BOWEL: Primary | ICD-10-CM

## 2023-04-28 ENCOUNTER — TELEPHONE (OUTPATIENT)
Dept: SURGERY | Facility: CLINIC | Age: 68
End: 2023-04-28
Payer: MEDICARE

## 2023-04-28 NOTE — TELEPHONE ENCOUNTER
4/28/23   0810  Spoke to patient and significant other confirming surgery date of 5/2/23. Surgery instructions provided. Both verbalized understanding.         ----- Message from Tyrone Martínez MD sent at 4/27/2023  4:49 PM CDT -----  I booked this radha for a port Tuesday May 2. Can you call him and make sure hes ok with that?

## 2023-05-01 ENCOUNTER — HOSPITAL ENCOUNTER (OUTPATIENT)
Dept: RADIOLOGY | Facility: HOSPITAL | Age: 68
Discharge: HOME | End: 2023-05-01
Attending: INTERNAL MEDICINE
Payer: MEDICARE

## 2023-05-01 DIAGNOSIS — C17.9 ADENOCARCINOMA OF SMALL BOWEL: ICD-10-CM

## 2023-05-01 PROCEDURE — 71250 CT CHEST WITHOUT CONTRAST: ICD-10-PCS | Mod: 26,,, | Performed by: RADIOLOGY

## 2023-05-01 PROCEDURE — 71250 CT THORAX DX C-: CPT | Mod: 26,,, | Performed by: RADIOLOGY

## 2023-05-01 PROCEDURE — 71250 CT THORAX DX C-: CPT | Mod: TC

## 2023-05-02 ENCOUNTER — ANESTHESIA (OUTPATIENT)
Dept: SURGERY | Facility: HOSPITAL | Age: 68
End: 2023-05-02
Payer: MEDICARE

## 2023-05-02 ENCOUNTER — ANESTHESIA EVENT (OUTPATIENT)
Dept: SURGERY | Facility: HOSPITAL | Age: 68
End: 2023-05-02
Payer: MEDICARE

## 2023-05-02 ENCOUNTER — HOSPITAL ENCOUNTER (OUTPATIENT)
Facility: HOSPITAL | Age: 68
Discharge: HOME | End: 2023-05-02
Attending: STUDENT IN AN ORGANIZED HEALTH CARE EDUCATION/TRAINING PROGRAM | Admitting: STUDENT IN AN ORGANIZED HEALTH CARE EDUCATION/TRAINING PROGRAM
Payer: MEDICARE

## 2023-05-02 VITALS
SYSTOLIC BLOOD PRESSURE: 137 MMHG | DIASTOLIC BLOOD PRESSURE: 78 MMHG | BODY MASS INDEX: 25.19 KG/M2 | HEIGHT: 72 IN | HEART RATE: 78 BPM | OXYGEN SATURATION: 97 % | RESPIRATION RATE: 16 BRPM | WEIGHT: 186 LBS | TEMPERATURE: 99 F

## 2023-05-02 DIAGNOSIS — C17.9 ADENOCARCINOMA OF SMALL BOWEL: Primary | ICD-10-CM

## 2023-05-02 PROCEDURE — 36000706: Performed by: STUDENT IN AN ORGANIZED HEALTH CARE EDUCATION/TRAINING PROGRAM

## 2023-05-02 PROCEDURE — 71000016 HC POSTOP RECOV ADDL HR: Performed by: STUDENT IN AN ORGANIZED HEALTH CARE EDUCATION/TRAINING PROGRAM

## 2023-05-02 PROCEDURE — 63600175 PHARM REV CODE 636 W HCPCS: Performed by: STUDENT IN AN ORGANIZED HEALTH CARE EDUCATION/TRAINING PROGRAM

## 2023-05-02 PROCEDURE — 37000009 HC ANESTHESIA EA ADD 15 MINS: Performed by: STUDENT IN AN ORGANIZED HEALTH CARE EDUCATION/TRAINING PROGRAM

## 2023-05-02 PROCEDURE — 25000003 PHARM REV CODE 250: Performed by: STUDENT IN AN ORGANIZED HEALTH CARE EDUCATION/TRAINING PROGRAM

## 2023-05-02 PROCEDURE — 77001 FLUOROGUIDE FOR VEIN DEVICE: CPT | Mod: 26,,, | Performed by: STUDENT IN AN ORGANIZED HEALTH CARE EDUCATION/TRAINING PROGRAM

## 2023-05-02 PROCEDURE — D9220A PRA ANESTHESIA: Mod: ANES,,, | Performed by: ANESTHESIOLOGY

## 2023-05-02 PROCEDURE — 36561 PR INSERT TUNNELED CV CATH WITH PORT: ICD-10-PCS | Mod: 79,RT,, | Performed by: STUDENT IN AN ORGANIZED HEALTH CARE EDUCATION/TRAINING PROGRAM

## 2023-05-02 PROCEDURE — 37000008 HC ANESTHESIA 1ST 15 MINUTES: Performed by: STUDENT IN AN ORGANIZED HEALTH CARE EDUCATION/TRAINING PROGRAM

## 2023-05-02 PROCEDURE — 77001 CHG FLUOROGUIDE CNTRL VEN ACCESS,PLACE,REPLACE,REMOVE: ICD-10-PCS | Mod: 26,,, | Performed by: STUDENT IN AN ORGANIZED HEALTH CARE EDUCATION/TRAINING PROGRAM

## 2023-05-02 PROCEDURE — 25000003 PHARM REV CODE 250: Performed by: NURSE ANESTHETIST, CERTIFIED REGISTERED

## 2023-05-02 PROCEDURE — 36000707: Performed by: STUDENT IN AN ORGANIZED HEALTH CARE EDUCATION/TRAINING PROGRAM

## 2023-05-02 PROCEDURE — 63600175 PHARM REV CODE 636 W HCPCS: Performed by: ANESTHESIOLOGY

## 2023-05-02 PROCEDURE — 71000015 HC POSTOP RECOV 1ST HR: Performed by: STUDENT IN AN ORGANIZED HEALTH CARE EDUCATION/TRAINING PROGRAM

## 2023-05-02 PROCEDURE — D9220A PRA ANESTHESIA: ICD-10-PCS | Mod: ANES,,, | Performed by: ANESTHESIOLOGY

## 2023-05-02 PROCEDURE — D9220A PRA ANESTHESIA: Mod: CRNA,,, | Performed by: NURSE ANESTHETIST, CERTIFIED REGISTERED

## 2023-05-02 PROCEDURE — 36561 INSERT TUNNELED CV CATH: CPT | Mod: 79,RT,, | Performed by: STUDENT IN AN ORGANIZED HEALTH CARE EDUCATION/TRAINING PROGRAM

## 2023-05-02 PROCEDURE — C1788 PORT, INDWELLING, IMP: HCPCS | Performed by: STUDENT IN AN ORGANIZED HEALTH CARE EDUCATION/TRAINING PROGRAM

## 2023-05-02 PROCEDURE — D9220A PRA ANESTHESIA: ICD-10-PCS | Mod: CRNA,,, | Performed by: NURSE ANESTHETIST, CERTIFIED REGISTERED

## 2023-05-02 PROCEDURE — 63600175 PHARM REV CODE 636 W HCPCS: Performed by: NURSE ANESTHETIST, CERTIFIED REGISTERED

## 2023-05-02 DEVICE — PORT POWER CLEAR VIEW: Type: IMPLANTABLE DEVICE | Site: CHEST | Status: FUNCTIONAL

## 2023-05-02 RX ORDER — LIDOCAINE HYDROCHLORIDE 10 MG/ML
INJECTION INFILTRATION; PERINEURAL
Status: DISCONTINUED | OUTPATIENT
Start: 2023-05-02 | End: 2023-05-02 | Stop reason: HOSPADM

## 2023-05-02 RX ORDER — DEXAMETHASONE SODIUM PHOSPHATE 4 MG/ML
INJECTION, SOLUTION INTRA-ARTICULAR; INTRALESIONAL; INTRAMUSCULAR; INTRAVENOUS; SOFT TISSUE
Status: DISCONTINUED | OUTPATIENT
Start: 2023-05-02 | End: 2023-05-02

## 2023-05-02 RX ORDER — FENTANYL CITRATE 50 UG/ML
25 INJECTION, SOLUTION INTRAMUSCULAR; INTRAVENOUS EVERY 5 MIN PRN
Status: DISCONTINUED | OUTPATIENT
Start: 2023-05-02 | End: 2023-05-02 | Stop reason: HOSPADM

## 2023-05-02 RX ORDER — ONDANSETRON 2 MG/ML
INJECTION INTRAMUSCULAR; INTRAVENOUS
Status: DISCONTINUED | OUTPATIENT
Start: 2023-05-02 | End: 2023-05-02

## 2023-05-02 RX ORDER — LIDOCAINE HYDROCHLORIDE 20 MG/ML
INJECTION INTRAVENOUS
Status: DISCONTINUED | OUTPATIENT
Start: 2023-05-02 | End: 2023-05-02

## 2023-05-02 RX ORDER — HYDROCODONE BITARTRATE AND ACETAMINOPHEN 5; 325 MG/1; MG/1
1 TABLET ORAL EVERY 4 HOURS PRN
Status: DISCONTINUED | OUTPATIENT
Start: 2023-05-02 | End: 2023-05-02 | Stop reason: HOSPADM

## 2023-05-02 RX ORDER — FENTANYL CITRATE 50 UG/ML
INJECTION, SOLUTION INTRAMUSCULAR; INTRAVENOUS
Status: DISCONTINUED | OUTPATIENT
Start: 2023-05-02 | End: 2023-05-02

## 2023-05-02 RX ORDER — BUPIVACAINE HYDROCHLORIDE 2.5 MG/ML
INJECTION, SOLUTION EPIDURAL; INFILTRATION; INTRACAUDAL
Status: DISCONTINUED | OUTPATIENT
Start: 2023-05-02 | End: 2023-05-02 | Stop reason: HOSPADM

## 2023-05-02 RX ORDER — PROCHLORPERAZINE EDISYLATE 5 MG/ML
5 INJECTION INTRAMUSCULAR; INTRAVENOUS EVERY 30 MIN PRN
Status: DISCONTINUED | OUTPATIENT
Start: 2023-05-02 | End: 2023-05-02 | Stop reason: HOSPADM

## 2023-05-02 RX ORDER — HEPARIN SODIUM 10000 [USP'U]/ML
INJECTION, SOLUTION INTRAVENOUS; SUBCUTANEOUS
Status: DISCONTINUED | OUTPATIENT
Start: 2023-05-02 | End: 2023-05-02 | Stop reason: HOSPADM

## 2023-05-02 RX ORDER — HYDROMORPHONE HYDROCHLORIDE 2 MG/ML
0.2 INJECTION, SOLUTION INTRAMUSCULAR; INTRAVENOUS; SUBCUTANEOUS EVERY 5 MIN PRN
Status: DISCONTINUED | OUTPATIENT
Start: 2023-05-02 | End: 2023-05-02 | Stop reason: HOSPADM

## 2023-05-02 RX ORDER — CEFAZOLIN SODIUM 2 G/50ML
2 SOLUTION INTRAVENOUS
Status: COMPLETED | OUTPATIENT
Start: 2023-05-02 | End: 2023-05-02

## 2023-05-02 RX ORDER — OXYCODONE AND ACETAMINOPHEN 5; 325 MG/1; MG/1
1 TABLET ORAL
Status: DISCONTINUED | OUTPATIENT
Start: 2023-05-02 | End: 2023-05-02 | Stop reason: HOSPADM

## 2023-05-02 RX ORDER — PROPOFOL 10 MG/ML
VIAL (ML) INTRAVENOUS
Status: DISCONTINUED | OUTPATIENT
Start: 2023-05-02 | End: 2023-05-02

## 2023-05-02 RX ORDER — AMOXICILLIN 250 MG
1 CAPSULE ORAL 2 TIMES DAILY
COMMUNITY
Start: 2023-05-02

## 2023-05-02 RX ORDER — ONDANSETRON 2 MG/ML
4 INJECTION INTRAMUSCULAR; INTRAVENOUS DAILY PRN
Status: DISCONTINUED | OUTPATIENT
Start: 2023-05-02 | End: 2023-05-02 | Stop reason: HOSPADM

## 2023-05-02 RX ADMIN — FENTANYL CITRATE 100 MCG: 50 INJECTION, SOLUTION INTRAMUSCULAR; INTRAVENOUS at 02:05

## 2023-05-02 RX ADMIN — LIDOCAINE HYDROCHLORIDE 100 MG: 20 INJECTION, SOLUTION INTRAVENOUS at 02:05

## 2023-05-02 RX ADMIN — PROPOFOL 50 MG: 10 INJECTION, EMULSION INTRAVENOUS at 02:05

## 2023-05-02 RX ADMIN — HYDROMORPHONE HYDROCHLORIDE 0.2 MG: 2 INJECTION, SOLUTION INTRAMUSCULAR; INTRAVENOUS; SUBCUTANEOUS at 03:05

## 2023-05-02 RX ADMIN — CEFAZOLIN SODIUM 2 G: 2 SOLUTION INTRAVENOUS at 02:05

## 2023-05-02 RX ADMIN — SODIUM CHLORIDE: 0.9 INJECTION, SOLUTION INTRAVENOUS at 01:05

## 2023-05-02 RX ADMIN — ONDANSETRON 4 MG: 2 INJECTION, SOLUTION INTRAMUSCULAR; INTRAVENOUS at 02:05

## 2023-05-02 RX ADMIN — DEXAMETHASONE SODIUM PHOSPHATE 4 MG: 4 INJECTION, SOLUTION INTRA-ARTICULAR; INTRALESIONAL; INTRAMUSCULAR; INTRAVENOUS; SOFT TISSUE at 02:05

## 2023-05-02 RX ADMIN — HYDROCODONE BITARTRATE AND ACETAMINOPHEN 1 TABLET: 5; 325 TABLET ORAL at 03:05

## 2023-05-02 RX ADMIN — GLYCOPYRROLATE 0.4 MG: 0.2 INJECTION, SOLUTION INTRAMUSCULAR; INTRAVITREAL at 02:05

## 2023-05-02 NOTE — TRANSFER OF CARE
Anesthesia Transfer of Care Note    Patient: Poli Young    Procedure(s) Performed: Procedure(s) (LRB):  INSERTION, VENOUS ACCESS PORT (Right)    Patient location: OPS    Anesthesia Type: general    Transport from OR: Transported from OR on room air with adequate spontaneous ventilation    Post pain: adequate analgesia    Post assessment: no apparent anesthetic complications and tolerated procedure well    Post vital signs: stable    Level of consciousness: awake and alert    Nausea/Vomiting: no nausea/vomiting    Complications: none    Transfer of care protocol was followed      Last vitals:   Visit Vitals  /74   Pulse 65   Temp 36.6 °C (97.9 °F) (Skin)   Resp 16   Ht 6' (1.829 m)   Wt 84.4 kg (186 lb)   SpO2 99%   BMI 25.23 kg/m²

## 2023-05-02 NOTE — DISCHARGE INSTRUCTIONS
You may shower in 24 hours, no tub baths, swimming pools or hot tubs until cleared by Dr. Martínez.  Do not pull white tape off of incision sites, they will fall off when they are ready to come off.  Use an ice pack to incision site as needed to help with pain and swelling.  Call Dr. Martínez for any questions or concerns regarding your surgery.    ANESTHESIA  -For the first 24 hours after surgery:  Do not drive, use heavy equipment, make important decisions, or drink alcohol  -It is normal to feel sleepy for several hours.  Rest until you are more awake.  -Have someone stay with you, if needed.  They can watch for problems and help keep you safe.  -Some possible post anesthesia side effects include: nausea and vomiting, sore throat and hoarseness, sleepiness, and dizziness.    PAIN  -If you have pain after surgery, pain medicine will help you feel better.  Take it as directed, before pain becomes severe.  Most pain relievers taken by mouth need at least 20-30 minutes to start working.  -Do not drive or drink alcohol while taking pain medicine.  -Pain medication can upset your stomach.  Taking them with a little food may help.  -Other ways to help control pain: elevation, ice, and relaxation  -Call your surgeon if still having unmanageable pain an hour after taking pain medicine.  -Pain medicine can cause constipation.  Taking an over-the counter stool softener while on prescription pain medicine and drinking plenty of fluids can prevent this side effect.  -Call your surgeon if you have severe side effects like: breathing problems, trouble waking up, dizziness, confusion, or severe constipation.    NAUSEA  -Some people have nausea after surgery.  This is often because of anesthesia, pain, pain medicine, or the stress of surgery.  -Do not push yourself to eat.  Start off with clear liquids and soup.  Slowly move to solid foods.  Don't eat fatty, rich, spicy foods at first.  Eat smaller amounts.  -If you develop  persistent nausea and vomiting please notify your surgeon immediately.    BLEEDING  -Different types of surgery require different types of care and dressing changes.  It is important to follow all instructions and advice from your surgeon.  Change dressing as directed.  Call your surgeon for any concerns regarding postop bleeding.    SIGNS OF INFECTION  -Signs of infection include: fever, swelling, drainage, and redness  -Notify your surgeon if you have a fever of 100.4 F (38.0 C) or higher.  -Notify your surgeon if you notice redness, swelling, increased pain, pus, or a foul smell at the incision site.

## 2023-05-02 NOTE — OP NOTE
[facility] ([facility])  General Surgery  Operative Note    SUMMARY     Date of Procedure: 5/2/2023     Procedure: Procedure(s) (LRB):  INSERTION, VENOUS ACCESS PORT (Right)       Surgeon(s) and Role:     * Tyrone Martínez MD - Primary    Assisting Surgeon: Yrn Howell PGY2    Pre-Operative Diagnosis: Adenocarcinoma of small bowel [C17.9]    Post-Operative Diagnosis: Post-Op Diagnosis Codes:     * Adenocarcinoma of small bowel [C17.9]    Anesthesia: Local MAC    Operative Findings (including complications, if any):     Description of Technical Procedures:   The patient was identified in the preoperative unit and taken back to the operating room and laid supine on the operating room table. IV antibiotics were administered prior to the start of anesthesia. MAC anesthesia was administered without complication. The patient was then prepped and draped in the standard sterile fashion. The ultrasound was used to identify the Right internal jugular vein. The patient was placed into the Trendelenburg position and an 18g needle was used to access the vein with one pass under ultrasound guidance. The ultrasound images were stored and interpreted by me. The wire was advanced without issue under fluoroscopic guidance. The bed was returned to leveled position and we turned our attention to creation of the port pocket in the right upper chest. 5ml of 1% local anesthesia was injected into the right upper chest in the location of the port pocket. A 4cm incision was made using a 15 blade scalpel, and the tissues were dissected using a bovie cautery until the pectoral fascia was identified. A pocket was made in the subcutaneous tissue to accommodate the port. Once we were satisfied with the pocket, the catheter was flushed and then measured using fluoro and then cut to an appropriate length. Using a tunneling device it was passed from the pocket to the neck incision and then connected and secured to the port. The port was  then sutured into position using 3.0 vicryl suture. The patient was returned to the Trendelenburg position. Under fluoroscopic guidance the dilator and sheath were placed over the wire using Seldinger technique. The dilator and wire were removed and the catheter was introduced and fed into the sheath as the peel away sheath was removed. Fluoro was used to confirm the position of the catheter in the distal SVC and that the catheter did not appear to be kinked at the neck location. The dermis of the port pocket was re-approximated with 4.0 vicryl suture in an interrupted fashion. A bobby needle was used to draw back blood and was then flushed and locked with heparin. The skin was re-approximated using 5.0 monocryl suture in a running fashion. Steri strips and dry sterile dressing were then applied. The patient was awakened from anesthesia without complication and returned to the postoperative recovery unit in stable condition. At the end of the case, sponge, instrument, and needle counts were correct and hemostasis was achieved. I was present and scrubbed throughout the entirety of the case.       Significant Surgical Tasks Conducted by the Assistant(s), if Applicable:     Estimated Blood Loss (EBL): 5ml           Implants: * No implants in log *    Specimens:   Specimen (24h ago, onward)      None                    Condition: Stable    Disposition: PACU - hemodynamically stable.    Attestation: I was present and scrubbed for the entire procedure.

## 2023-05-02 NOTE — H&P
Patient ID: Poli Young is a 67 y.o. male.    Chief Complaint: No chief complaint on file.      HPI:  HPI  67M with small bowel adenocarcinoma. Seen by oncology. Several LNs involved. Overall feels well, no ab pain, eating well.     Review of Systems   Constitutional:  Negative for chills, diaphoresis and fever.   HENT:  Negative for trouble swallowing.    Respiratory:  Negative for cough, shortness of breath, wheezing and stridor.    Cardiovascular:  Negative for chest pain and palpitations.   Gastrointestinal:  Negative for abdominal distention, abdominal pain, blood in stool, diarrhea, nausea and vomiting.   Endocrine: Negative for cold intolerance and heat intolerance.   Genitourinary:  Negative for difficulty urinating.   Musculoskeletal:  Negative for back pain.   Skin:  Negative for rash.   Allergic/Immunologic: Negative for immunocompromised state.   Neurological:  Negative for dizziness, syncope and numbness.   Hematological:  Negative for adenopathy.   Psychiatric/Behavioral:  Negative for agitation.      Current Facility-Administered Medications   Medication Dose Route Frequency Provider Last Rate Last Admin    cefazolin (ANCEF) 2 gram in dextrose 5% 50 mL IVPB (premix)  2 g Intravenous On Call Procedure Tyrone Martínez MD           Review of patient's allergies indicates:  No Known Allergies    Past Medical History:   Diagnosis Date    Hyperlipemia     Hypertension        Past Surgical History:   Procedure Laterality Date    DIAGNOSTIC LAPAROSCOPY N/A 4/4/2023    Procedure: LAPAROSCOPY, DIAGNOSTIC;  Surgeon: Tyrone Martínez MD;  Location: [facility];  Service: General;  Laterality: N/A;    ESOPHAGOGASTRODUODENOSCOPY N/A 6/8/2022    Procedure: EGD (ESOPHAGOGASTRODUODENOSCOPY);  Surgeon: Aamir Rushing MD;  Location: [facility];  Service: Endoscopy;  Laterality: N/A;    EXCISION, SMALL INTESTINE N/A 4/4/2023    Procedure: EXCISION, SMALL INTESTINE/SMALL BOWEL RESECTION;  Surgeon: Tyrone MCDANIEL  MD Tanya;  Location: [facility] OR;  Service: General;  Laterality: N/A;    INTRALUMINAL GASTROINTESTINAL TRACT IMAGING VIA CAPSULE N/A 1/20/2023    Procedure: IMAGING PROCEDURE, GI TRACT, INTRALUMINAL, VIA CAPSULE;  Surgeon: Aamir Rushing MD;  Location: [facility] ENDO;  Service: Endoscopy;  Laterality: N/A;    LAPAROTOMY, EXPLORATORY N/A 4/4/2023    Procedure: LAPAROTOMY, EXPLORATORY;  Surgeon: Tyrone Martínez MD;  Location: [facility] OR;  Service: General;  Laterality: N/A;    SMALL BOWEL ENTEROSCOPY N/A 3/20/2023    Procedure: ENTEROSCOPY Upper DBE;  Surgeon: Giancarlo Cruz MD;  Location: [facility] ENDO;  Service: Endoscopy;  Laterality: N/A;       No family history on file.    Social History     Socioeconomic History    Marital status: Single   Tobacco Use    Smoking status: Never    Smokeless tobacco: Never   Substance and Sexual Activity    Alcohol use: Yes     Comment: socially    Drug use: Never    Sexual activity: Yes     Partners: Female     Social Determinants of Health     Financial Resource Strain: Low Risk     Difficulty of Paying Living Expenses: Not hard at all   Food Insecurity: No Food Insecurity    Worried About Running Out of Food in the Last Year: Never true    Ran Out of Food in the Last Year: Never true   Transportation Needs: No Transportation Needs    Lack of Transportation (Medical): No    Lack of Transportation (Non-Medical): No   Physical Activity: Sufficiently Active    Days of Exercise per Week: 5 days    Minutes of Exercise per Session: 30 min   Stress: No Stress Concern Present    Feeling of Stress : Not at all   Social Connections: Unknown    Frequency of Communication with Friends and Family: More than three times a week    Frequency of Social Gatherings with Friends and Family: More than three times a week    Attends [employer] Services: Never    Active Member of Clubs or Organizations: Patient refused    Attends Club or Organization Meetings: Patient refused    Marital Status: Living with  partner   Housing Stability: Low Risk     Unable to Pay for Housing in the Last Year: No    Number of Places Lived in the Last Year: 1    Unstable Housing in the Last Year: No       There were no vitals filed for this visit.    Physical Exam  Constitutional:       General: He is not in acute distress.  HENT:      Head: Normocephalic and atraumatic.   Eyes:      General: No scleral icterus.  Cardiovascular:      Rate and Rhythm: Normal rate.   Pulmonary:      Effort: Pulmonary effort is normal. No respiratory distress.      Breath sounds: No stridor.   Abdominal:      Palpations: Abdomen is soft.      Tenderness: There is no abdominal tenderness.   Lymphadenopathy:      Cervical: No cervical adenopathy.   Skin:     General: Skin is warm.      Findings: No erythema.   Neurological:      Mental Status: He is alert and oriented to person, place, and time.   Psychiatric:         Behavior: Behavior normal.       Assessment & Plan:  67M with small bowel adenocarcinoma  To OR for port palcement

## 2023-05-02 NOTE — ANESTHESIA PREPROCEDURE EVALUATION
05/02/2023  Poli Young is a 67 y.o., male.      Pre-op Assessment    I have reviewed the Patient Summary Reports.     I have reviewed the Nursing Notes. I have reviewed the NPO Status.   I have reviewed the Medications.     Review of Systems  Anesthesia Hx:  Denies Family Hx of Anesthesia complications.    Social:  Non-Smoker    Hematology/Oncology:     Oncology Normal    -- Anemia: Hematology Comments: Acute gi bleed    EENT/Dental:EENT/Dental Normal   Cardiovascular:   Hypertension  Denies Angina. hyperlipidemia  Denies Cardiovascular Symptoms.  Denies Coronary Artery Disease.  Denies Valvular Heart Disease. Denies Congenital Heart Disease.    Denies Congenital Heart Disease.   Denies Deep Venous Thrombosis (DVT)  Hypertension   Denies Disorder of Cardiac Rhythm       Pulmonary:  Pulmonary Normal  Denies Pulmonary Symptoms.  Denies Asthma.  Denies Chronic Obstructive Pulmonary Disease (COPD).  Denies Obstructive Sleep Apnea (DEMETRIO) Denies Possible Obstructive Sleep Apnea      Denies Pulmonary Infection.    Renal/:  Renal/ Normal   Denies Renal Symptoms/Infections/Stones    Hepatic/GI:  Hepatic/GI Normal  Hepatic/GI Symptoms: melena.  Denies Liver Disease    Musculoskeletal:  Musculoskeletal Normal  Denies Musculoskeletal General/Symptoms  Denies Rheumatic Disease  Denies Cervical Spine Disorder    Neurological:  Neurology Normal  Denies Dx of Headaches Denies Seizure Disorder  Denies CVA - Cerebrovasular Accident  Denies TIA - Transient Ischemic Attack  Denies Movement Disorder Dx Denies Neuromuscular Disease   Endocrine:  Endocrine Normal  Denies Diabetes  Denies Thyroid Disease    Dermatological:  Skin Normal    Psych:  Psychiatric Normal           Physical Exam  General: Well nourished, Cooperative, Alert and Oriented    Airway:  Mallampati: I   Mouth Opening: Normal  TM Distance: Normal  Tongue:  Normal  Neck ROM: Normal ROM    Chest/Lungs:  Clear to auscultation, Normal Respiratory Rate    Heart:  Rate: Normal  Rhythm: Regular Rhythm        Anesthesia Plan  Type of Anesthesia, risks & benefits discussed:    Anesthesia Type: Gen Natural Airway, MAC  Intra-op Monitoring Plan: Standard ASA Monitors  Post Op Pain Control Plan: multimodal analgesia  Induction:  IV  Airway Plan: Direct  Informed Consent: Informed consent signed with the Patient and all parties understand the risks and agree with anesthesia plan.  All questions answered. Patient consented to blood products? Yes  ASA Score: 3  Day of Surgery Review of History & Physical: H&P Update referred to the surgeon/provider.    Ready For Surgery From Anesthesia Perspective.     .

## 2023-05-03 NOTE — PLAN OF CARE
START OFF PATHWAY REGIMEN - Other            Oxaliplatin (Eloxatin)       Leucovorin       Fluorouracil       Fluorouracil     **Always confirm dose/schedule in your pharmacy ordering system**    Patient Characteristics:  Intent of Therapy:  Curative Intent, Discussed with Patient

## 2023-05-03 NOTE — ANESTHESIA POSTPROCEDURE EVALUATION
Anesthesia Post Evaluation    Patient: Poli Young    Procedure(s) Performed: Procedure(s) (LRB):  INSERTION, VENOUS ACCESS PORT (Right)    Final Anesthesia Type: general      Patient location during evaluation: PACU  Patient participation: Yes- Able to Participate  Level of consciousness: awake and alert  Post-procedure vital signs: reviewed and stable  Pain management: adequate  Airway patency: patent    PONV status at discharge: No PONV  Anesthetic complications: no      Cardiovascular status: blood pressure returned to baseline  Respiratory status: unassisted  Hydration status: euvolemic            Vitals Value Taken Time   /78 05/02/23 1610   Temp 37.2 °C (98.9 °F) 05/02/23 1610   Pulse 78 05/02/23 1610   Resp 16 05/02/23 1610   SpO2 97 % 05/02/23 1610         No case tracking events are documented in the log.      Pain/Gui Score: Pain Rating Prior to Med Admin: 7 (5/2/2023  3:24 PM)  Pain Rating Post Med Admin: 2 (5/2/2023  4:10 PM)  Gui Score: 10 (5/2/2023  4:10 PM)

## 2023-05-03 NOTE — DISCHARGE SUMMARY
[facility] Surgery ([facility])  Short Stay  Discharge Summary    Admit Date: 5/2/2023    Discharge Date and Time: 5/2/2023  4:17 PM      Discharge Attending Physician: No att. providers found     Hospital Course (synopsis of major diagnoses, care, treatment, and services provided during the course of the hospital stay): Patient brought in for elective surgery. Underwent procedure without complication and was discharged.       Final Diagnoses:    Principal Problem: Adenocarcinoma of small bowel   Secondary Diagnoses:   Active Hospital Problems    Diagnosis  POA    *Adenocarcinoma of small bowel [C17.9]  Yes      Resolved Hospital Problems   No resolved problems to display.       Discharged Condition: stable    Disposition: Home or Self Care    Follow up/Patient Instructions:    Medications:  Reconciled Home Medications:      Medication List        START taking these medications      senna-docusate 8.6-50 mg 8.6-50 mg per tablet  Commonly known as: PERICOLACE  Take 1 tablet by mouth 2 (two) times daily.            CONTINUE taking these medications      amLODIPine 10 MG tablet  Commonly known as: NORVASC  Take 10 mg by mouth once daily.     aspirin 81 MG EC tablet  Commonly known as: ECOTRIN  Take 81 mg by mouth.     atorvastatin 10 MG tablet  Commonly known as: LIPITOR  Take 10 mg by mouth once daily.     fluticasone propionate 50 mcg/actuation nasal spray  Commonly known as: FLONASE  1 spray by Each Nostril route once daily.     HYDROcodone-acetaminophen 5-325 mg per tablet  Commonly known as: NORCO  Take 1 tablet by mouth every 4 (four) hours as needed for Pain.     krill-om-3-dha-epa-phospho-ast 1,000-230-60 mg Cap  MegaRed Omega-3 Krill Oil 1,000 mg-230 mg-60 mg capsule   Take 1 capsule by oral route.     lisinopriL 2.5 MG tablet  Commonly known as: PRINIVIL,ZESTRIL  Take 2.5 mg by mouth once daily.     ondansetron 8 MG tablet  Commonly known as: ZOFRAN  Take 1 tablet (8 mg total) by mouth every 8 (eight) hours as  needed for Nausea.     pantoprazole 40 MG tablet  Commonly known as: PROTONIX  Take 1 tablet (40 mg total) by mouth once daily.     sildenafiL 50 MG tablet  Commonly known as: VIAGRA  Take 50 mg by mouth daily as needed.            Discharge Procedure Orders   Diet general     Call MD for:  temperature >100.4     Call MD for:  persistent nausea and vomiting     Call MD for:  severe uncontrolled pain     No dressing needed     Shower on day dressing removed (No bath)      Follow-up Information       SELWYN Novak Follow up.    Specialty: Family Medicine  Contact information:  [address]  [address]  [place] [address] [phone]               Tyrone Martínez MD Follow up.    Specialties: Surgery, General Surgery  Why: Can follow up with me if needed  Contact information:  [address]  [address]  [place] [address] [phone]

## 2023-05-04 ENCOUNTER — TELEPHONE (OUTPATIENT)
Dept: HEMATOLOGY/ONCOLOGY | Facility: CLINIC | Age: 68
End: 2023-05-04
Payer: MEDICARE

## 2023-05-07 RX ORDER — ONDANSETRON 8 MG/1
8 TABLET, ORALLY DISINTEGRATING ORAL EVERY 8 HOURS PRN
Qty: 60 TABLET | Refills: 5 | Status: SHIPPED | OUTPATIENT
Start: 2023-05-09 | End: 2023-07-11 | Stop reason: SDUPTHER

## 2023-05-07 RX ORDER — DEXAMETHASONE 4 MG/1
8 TABLET ORAL DAILY
Qty: 24 TABLET | Refills: 5 | Status: SHIPPED | OUTPATIENT
Start: 2023-05-10 | End: 2023-07-09 | Stop reason: SDUPTHER

## 2023-05-08 LAB
ONEOME COMMENT: NORMAL
ONEOME METHOD: NORMAL

## 2023-05-09 ENCOUNTER — TELEPHONE (OUTPATIENT)
Dept: HEMATOLOGY/ONCOLOGY | Facility: CLINIC | Age: 68
End: 2023-05-09
Payer: MEDICARE

## 2023-05-09 ENCOUNTER — LAB VISIT (OUTPATIENT)
Dept: LAB | Facility: HOSPITAL | Age: 68
End: 2023-05-09
Attending: INTERNAL MEDICINE
Payer: MEDICARE

## 2023-05-09 DIAGNOSIS — C17.9 ADENOCARCINOMA OF SMALL BOWEL: ICD-10-CM

## 2023-05-09 DIAGNOSIS — D64.9 ANEMIA, UNSPECIFIED TYPE: ICD-10-CM

## 2023-05-09 LAB
ALBUMIN SERPL BCP-MCNC: 4.1 G/DL (ref 3.5–5.2)
ALP SERPL-CCNC: 98 U/L (ref 55–135)
ALT SERPL W/O P-5'-P-CCNC: 21 U/L (ref 10–44)
ANION GAP SERPL CALC-SCNC: 8 MMOL/L (ref 8–16)
AST SERPL-CCNC: 16 U/L (ref 10–40)
BASOPHILS # BLD AUTO: 0.03 K/UL (ref 0–0.2)
BASOPHILS NFR BLD: 0.7 % (ref 0–1.9)
BILIRUB SERPL-MCNC: 0.2 MG/DL (ref 0.1–1)
BUN SERPL-MCNC: 16 MG/DL (ref 8–23)
CALCIUM SERPL-MCNC: 9.6 MG/DL (ref 8.7–10.5)
CEA SERPL-MCNC: 2 NG/ML (ref 0–5)
CHLORIDE SERPL-SCNC: 107 MMOL/L (ref 95–110)
CO2 SERPL-SCNC: 27 MMOL/L (ref 23–29)
CREAT SERPL-MCNC: 1 MG/DL (ref 0.5–1.4)
DIFFERENTIAL METHOD: ABNORMAL
EOSINOPHIL # BLD AUTO: 0.2 K/UL (ref 0–0.5)
EOSINOPHIL NFR BLD: 3.3 % (ref 0–8)
ERYTHROCYTE [DISTWIDTH] IN BLOOD BY AUTOMATED COUNT: 16.1 % (ref 11.5–14.5)
EST. GFR  (NO RACE VARIABLE): >60 ML/MIN/1.73 M^2
FERRITIN SERPL-MCNC: 17 NG/ML (ref 20–300)
GLUCOSE SERPL-MCNC: 100 MG/DL (ref 70–110)
HCT VFR BLD AUTO: 35.3 % (ref 40–54)
HGB BLD-MCNC: 11.1 G/DL (ref 14–18)
IMM GRANULOCYTES # BLD AUTO: 0.01 K/UL (ref 0–0.04)
IMM GRANULOCYTES NFR BLD AUTO: 0.2 % (ref 0–0.5)
IRON SERPL-MCNC: 25 UG/DL (ref 45–160)
LYMPHOCYTES # BLD AUTO: 1.7 K/UL (ref 1–4.8)
LYMPHOCYTES NFR BLD: 37.3 % (ref 18–48)
MAGNESIUM SERPL-MCNC: 1.9 MG/DL (ref 1.6–2.6)
MCH RBC QN AUTO: 25.9 PG (ref 27–31)
MCHC RBC AUTO-ENTMCNC: 31.4 G/DL (ref 32–36)
MCV RBC AUTO: 82 FL (ref 82–98)
MONOCYTES # BLD AUTO: 0.5 K/UL (ref 0.3–1)
MONOCYTES NFR BLD: 10.1 % (ref 4–15)
NEUTROPHILS # BLD AUTO: 2.2 K/UL (ref 1.8–7.7)
NEUTROPHILS NFR BLD: 48.4 % (ref 38–73)
NRBC BLD-RTO: 0 /100 WBC
PLATELET # BLD AUTO: 246 K/UL (ref 150–450)
PMV BLD AUTO: 8.1 FL (ref 9.2–12.9)
POTASSIUM SERPL-SCNC: 4.7 MMOL/L (ref 3.5–5.1)
PROT SERPL-MCNC: 7.8 G/DL (ref 6–8.4)
RBC # BLD AUTO: 4.29 M/UL (ref 4.6–6.2)
SATURATED IRON: 5 % (ref 20–50)
SODIUM SERPL-SCNC: 142 MMOL/L (ref 136–145)
TOTAL IRON BINDING CAPACITY: 503 UG/DL (ref 250–450)
TRANSFERRIN SERPL-MCNC: 340 MG/DL (ref 200–375)
WBC # BLD AUTO: 4.56 K/UL (ref 3.9–12.7)

## 2023-05-09 PROCEDURE — 83735 ASSAY OF MAGNESIUM: CPT | Performed by: INTERNAL MEDICINE

## 2023-05-09 PROCEDURE — 82728 ASSAY OF FERRITIN: CPT | Performed by: INTERNAL MEDICINE

## 2023-05-09 PROCEDURE — 82378 CARCINOEMBRYONIC ANTIGEN: CPT | Performed by: INTERNAL MEDICINE

## 2023-05-09 PROCEDURE — 84466 ASSAY OF TRANSFERRIN: CPT | Performed by: INTERNAL MEDICINE

## 2023-05-09 PROCEDURE — 36415 COLL VENOUS BLD VENIPUNCTURE: CPT | Performed by: INTERNAL MEDICINE

## 2023-05-09 PROCEDURE — 85025 COMPLETE CBC W/AUTO DIFF WBC: CPT | Performed by: INTERNAL MEDICINE

## 2023-05-09 PROCEDURE — 80053 COMPREHEN METABOLIC PANEL: CPT | Performed by: INTERNAL MEDICINE

## 2023-05-09 NOTE — TELEPHONE ENCOUNTER
Patient and significant other presented for chemo class.. Nurse reviewed in detail the following: benefits and side effects of chemotherapy.  The following was discussed : the necessity of limiting exposure to anyone with an active illness, such as flu,virus,temperature or infectious disease such as pneumonia,meningitis ,TBC, hepatitis etc.   Explained that their immune system is compromised and it is important they avoid  contact with infectious illnesses due to the potential harm to themselves .    Proper hand washing hygiene reviewed with patient and requested they review this procedure with all coming in contact with them especially in immediate household. Reviewed neutropenic precautions with patient informing patient that their white count may decrease due to his chemotherapy and this will put them at a higher risk of infection .   Instructed patient to contact physician if they develop a temperature, uncontrolled N,V,or D  that last 24 hours or greater.    If a new pain develops or existing pain is not controlled by current  prescribed medication to contact physician for recommendation. Advised patient that all fruits and vegetables are to be washed thoroughly prior to eating. It is recommended that they not eat at buffet bars ,eat sushi,or consume oysters in any form cooked or not due to potential bacteria. Pt informed they  are allowed to eat salad at home after it is washed thoroughly.   Explained that it is important to use sunscreen as chemo causes individuals to be sun sensitive and  we want to avoid sunburns which has the potential for infection.   Instructed patient to use gloves if working in a garden due to bacteria in potting soil and dirt. Advised when participating in outdoor activities such as hunting and fishing to use precautions not to be exposed to marine bacteria such as let someone else bait fishing hook,clean fish and take fish off line.  During hunting let someone else deal with cleaning  animals.    Sexual activity information sheets given to  and reviewed with patient.   Explained that prior to any dental care physician should be notified as to what is to be performed so the physician may make recommendations .   Instructed  pt to contact  physician if mouth sores develop for advise. Recipe for baking soda and saline mouthwash reviewed with patient   Alopecia discussed with pt.  Advised patient that if their newly diagnosed disease is creating undue stress,anxiety or fears, the  nurse can facilitate locating a psychiatrist or psychologist for them to speak with.   Importance of keeping all scheduled appointments with physician and ancillary testing.    Patient presented opportunity to ask questions . All questions answered in detail and pt acknowledged  understanding of information given during presentation.  Nurse instructed pt how to contact her with any concerns, questions or needs that may arise.     Total time 1 hr 13 min

## 2023-05-10 ENCOUNTER — TELEPHONE (OUTPATIENT)
Dept: HEMATOLOGY/ONCOLOGY | Facility: CLINIC | Age: 68
End: 2023-05-10
Payer: MEDICARE

## 2023-05-10 ENCOUNTER — OFFICE VISIT (OUTPATIENT)
Dept: GASTROENTEROLOGY | Facility: CLINIC | Age: 68
End: 2023-05-10
Payer: MEDICARE

## 2023-05-10 VITALS — HEIGHT: 72 IN | WEIGHT: 192.88 LBS | BODY MASS INDEX: 26.12 KG/M2

## 2023-05-10 DIAGNOSIS — C17.9 ADENOCARCINOMA OF SMALL BOWEL: Primary | ICD-10-CM

## 2023-05-10 DIAGNOSIS — D50.0 IRON DEFICIENCY ANEMIA DUE TO CHRONIC BLOOD LOSS: ICD-10-CM

## 2023-05-10 PROCEDURE — 1101F PR PT FALLS ASSESS DOC 0-1 FALLS W/OUT INJ PAST YR: ICD-10-PCS | Mod: CPTII,S$GLB,, | Performed by: INTERNAL MEDICINE

## 2023-05-10 PROCEDURE — 3008F BODY MASS INDEX DOCD: CPT | Mod: CPTII,S$GLB,, | Performed by: INTERNAL MEDICINE

## 2023-05-10 PROCEDURE — 99213 PR OFFICE/OUTPT VISIT, EST, LEVL III, 20-29 MIN: ICD-10-PCS | Mod: S$GLB,,, | Performed by: INTERNAL MEDICINE

## 2023-05-10 PROCEDURE — 3288F PR FALLS RISK ASSESSMENT DOCUMENTED: ICD-10-PCS | Mod: CPTII,S$GLB,, | Performed by: INTERNAL MEDICINE

## 2023-05-10 PROCEDURE — 1159F PR MEDICATION LIST DOCUMENTED IN MEDICAL RECORD: ICD-10-PCS | Mod: CPTII,S$GLB,, | Performed by: INTERNAL MEDICINE

## 2023-05-10 PROCEDURE — 1101F PT FALLS ASSESS-DOCD LE1/YR: CPT | Mod: CPTII,S$GLB,, | Performed by: INTERNAL MEDICINE

## 2023-05-10 PROCEDURE — 3288F FALL RISK ASSESSMENT DOCD: CPT | Mod: CPTII,S$GLB,, | Performed by: INTERNAL MEDICINE

## 2023-05-10 PROCEDURE — 4010F PR ACE/ARB THEARPY RXD/TAKEN: ICD-10-PCS | Mod: CPTII,S$GLB,, | Performed by: INTERNAL MEDICINE

## 2023-05-10 PROCEDURE — 99999 PR PBB SHADOW E&M-EST. PATIENT-LVL III: CPT | Mod: PBBFAC,,, | Performed by: INTERNAL MEDICINE

## 2023-05-10 PROCEDURE — 1125F PR PAIN SEVERITY QUANTIFIED, PAIN PRESENT: ICD-10-PCS | Mod: CPTII,S$GLB,, | Performed by: INTERNAL MEDICINE

## 2023-05-10 PROCEDURE — 1111F PR DISCHARGE MEDS RECONCILED W/ CURRENT OUTPATIENT MED LIST: ICD-10-PCS | Mod: CPTII,S$GLB,, | Performed by: INTERNAL MEDICINE

## 2023-05-10 PROCEDURE — 4010F ACE/ARB THERAPY RXD/TAKEN: CPT | Mod: CPTII,S$GLB,, | Performed by: INTERNAL MEDICINE

## 2023-05-10 PROCEDURE — 1111F DSCHRG MED/CURRENT MED MERGE: CPT | Mod: CPTII,S$GLB,, | Performed by: INTERNAL MEDICINE

## 2023-05-10 PROCEDURE — 99213 OFFICE O/P EST LOW 20 MIN: CPT | Mod: S$GLB,,, | Performed by: INTERNAL MEDICINE

## 2023-05-10 PROCEDURE — 1159F MED LIST DOCD IN RCRD: CPT | Mod: CPTII,S$GLB,, | Performed by: INTERNAL MEDICINE

## 2023-05-10 PROCEDURE — 99999 PR PBB SHADOW E&M-EST. PATIENT-LVL III: ICD-10-PCS | Mod: PBBFAC,,, | Performed by: INTERNAL MEDICINE

## 2023-05-10 PROCEDURE — 3008F PR BODY MASS INDEX (BMI) DOCUMENTED: ICD-10-PCS | Mod: CPTII,S$GLB,, | Performed by: INTERNAL MEDICINE

## 2023-05-10 PROCEDURE — 1125F AMNT PAIN NOTED PAIN PRSNT: CPT | Mod: CPTII,S$GLB,, | Performed by: INTERNAL MEDICINE

## 2023-05-10 NOTE — PROGRESS NOTES
GASTROENTEROLOGY CLINIC NOTE    Reason for visit: The primary encounter diagnosis was Adenocarcinoma of small bowel. A diagnosis of Iron deficiency anemia due to chronic blood loss was also pertinent to this visit.  Referring provider/PCP: SELWYN Novak    HPI:  Poli Young is a 67 y.o. male here today for follow up iron def intussuception    Interval  Accompanied by significatn other  Here for follow up  Underwent VCE then upper balloon, showed small bowel adeno.  Had surgical resection. Has scar midline, healed.  Doing well. Recovering well. Good BM. No vomiting.  He has port now for planned chemo.      =================  Intial clinic visit  Hospital followup. See bloew for EGD results.  abd pain has improved. Aching in left side has gone. Some gurlging in the abd.   Completed all of triple therapy, unclear if he truly completed them all at same time. He thinks he is still taking protonix.  No vomiting. No changes in bowels. No blood in stool    Prior Endoscopy:  EGD:  6/2022  Impression:            - Normal esophagus.                          - Gastritis, very mild and clinically                          insignificant. Biopsied.                          - Prominent area of the papilla found in the                          duodenum. Biopsied. suspected benign.   Recommendation:        - return to [facility], possible D/C same day.                          - Patient has a contact number available for                          emergencies. The signs and symptoms of potential                          delayed complications were discussed with the                          patient. Return to normal activities tomorrow.                          Written discharge instructions were provided to                          the patient.                          - Resume previous diet.                          - Continue present medications.                          - Await pathology results.                           - Return to GI clinic at the next available                          appointment.                          - Await path to rule out Hpylori. Consider                          colonoscopy as outpatient if pathology negative.                          - Would obtain non-urgent CT scan of abd / pelvis                          to rule out anh-ampullary changes.   PATH  RELIAPATH DIAGNOSIS:   A.   DUODENUM, PAPILLA, BIOPSY:   - Duodenal mucosa with reactive epithelial changes and increased chronic   inflammation within lamina propria.   - No Helicobacter pylori organisms identified by *[facility].   - Negative for dysplasia or malignancy.   B.   STOMACH, BIOPSY:   - Moderate chronic inactive gastritis with microfocal activity.   - Numerous Helicobacter pylori organisms identified by *[facility].   - Negative for intestinal metaplasia, dysplasia, or malignancy.     Colon:    He reports 2017 at [facility], normal repeat 10 year.    VCE  1/2023   Red plume of blood mid small bowel    Upper ballon 3/2023  Impression:            - Mass lesion in the mid jejunum typical for                          adenocarcinoma - biopsied and marked with Inna Ink                          - History of anemia due to GI blood loss localized                          to the mid jejunum by recent video capsule study     status post small bowel resection on 4/4/23. Pathology positive for  Invasive adenocarcinoma with partial mucinous component (40% of tumor) Lymphovascular invasion is present   17 lymph nodes, 3 positive for metastatic carcinoma (3/17) .Resection margins free of tumor  Pathologic stage pT3 pN Category:  pN2     (Portions of this note were dictated using voice recognition software and may contain dictation related errors in spelling/grammar/syntax not found on text review)    Review of Systems   Constitutional:  Negative for fever and malaise/fatigue.   Respiratory:  Negative for cough and shortness of breath.    Cardiovascular:  Negative for  chest pain and palpitations.   Gastrointestinal:  Negative for abdominal pain, blood in stool, nausea and vomiting.   Neurological:  Negative for dizziness and headaches.     Past Medical History: has a past medical history of Hyperlipemia and Hypertension.    Past Surgical History: has a past surgical history that includes Esophagogastroduodenoscopy (N/A, 6/8/2022); Intraluminal gastrointestinal tract imaging via capsule (N/A, 1/20/2023); Small bowel enteroscopy (N/A, 3/20/2023); Diagnostic laparoscopy (N/A, 4/4/2023); excision, small intestine (N/A, 4/4/2023); laparotomy, exploratory (N/A, 4/4/2023); and Insertion of venous access port (Right, 5/2/2023).    Family History:family history is not on file.    Allergies: Review of patient's allergies indicates:  No Known Allergies    Social History: reports that he has never smoked. He has never used smokeless tobacco. He reports current alcohol use. He reports that he does not use drugs.    Home medications:   Current Outpatient Medications on File Prior to Visit   Medication Sig Dispense Refill    amLODIPine (NORVASC) 10 MG tablet Take 10 mg by mouth once daily.      aspirin (ECOTRIN) 81 MG EC tablet Take 81 mg by mouth.      atorvastatin (LIPITOR) 10 MG tablet Take 10 mg by mouth once daily.      dexAMETHasone (DECADRON) 4 MG Tab Take 2 tablets (8 mg total) by mouth once daily. Take as directed on days 2 and 3 of your chemotherapy cycle. 24 tablet 5    fluticasone propionate (FLONASE) 50 mcg/actuation nasal spray 1 spray by Each Nostril route once daily.      krill-om-3-dha-epa-phospho-ast 1,000-230-60 mg Cap MegaRed Omega-3 Krill Oil 1,000 mg-230 mg-60 mg capsule   Take 1 capsule by oral route.      lisinopriL (PRINIVIL,ZESTRIL) 2.5 MG tablet Take 2.5 mg by mouth once daily.      ondansetron (ZOFRAN-ODT) 8 MG TbDL Take 1 tablet (8 mg total) by mouth every 8 (eight) hours as needed (nausea/vomiting). 60 tablet 5    pantoprazole (PROTONIX) 40 MG tablet Take 1 tablet  (40 mg total) by mouth once daily. 90 tablet 3    senna-docusate 8.6-50 mg (PERICOLACE) 8.6-50 mg per tablet Take 1 tablet by mouth 2 (two) times daily.      sildenafiL (VIAGRA) 50 MG tablet Take 50 mg by mouth daily as needed.      HYDROcodone-acetaminophen (NORCO) 5-325 mg per tablet Take 1 tablet by mouth every 4 (four) hours as needed for Pain. (Patient not taking: Reported on 5/10/2023) 10 tablet 0    ondansetron (ZOFRAN) 8 MG tablet Take 1 tablet (8 mg total) by mouth every 8 (eight) hours as needed for Nausea. 20 tablet 1     No current facility-administered medications on file prior to visit.       Vital signs:  Ht 6' (1.829 m)   Wt 87.5 kg (192 lb 14.4 oz)   BMI 26.16 kg/m²     Physical Exam  Vitals reviewed.   Constitutional:       Appearance: He is not toxic-appearing.   HENT:      Head: Normocephalic and atraumatic.   Eyes:      General: No scleral icterus.     Conjunctiva/sclera: Conjunctivae normal.   Abdominal:      Comments: Midline healed scar   Neurological:      Mental Status: He is alert and oriented to person, place, and time.      Gait: Gait normal.   Psychiatric:         Mood and Affect: Mood normal.         Behavior: Behavior normal.       I have reviewed prior labs, imaging, notes from last month    Assessment:  1. Adenocarcinoma of small bowel    2. Iron deficiency anemia due to chronic blood loss      Iron def anemia, positive VCE now with small bowel adenocarcinoma, s/p surgical resection  Recovering well  Has oncology plans in place      Plan:       Continue current treatment with oncology    Contact us as needed.    RTC prn        Aamir Rushing MD  [facility]

## 2023-05-10 NOTE — TELEPHONE ENCOUNTER
Tc to pt's significant other viviana advising her that Mr rosario is to take his dexamethasone 2 tablets on Days 2 & 3 of chemo once daily Message left on VM advsiing her she may call back if needed

## 2023-05-10 NOTE — LETTER
May 10, 2023        Tyrone Martínez MD  [address]  [address]  [facility] [address]              [facility] [facility] [address]  [address]  [facility] [address]  Phone: [phone]  Fax: [phone]   Patient: Poli Young   MR Number: [id number]   YOB: 1955   Date of Visit: 5/10/2023       Dear Dr. Martínez:    Thank you for referring Poli Young to me for evaluation. Below are the relevant portions of my assessment and plan of care.            If you have questions, please do not hesitate to call me. I look forward to following Poli along with you.    Sincerely,      Aamir Rushing MD           CC    No Recipients

## 2023-05-11 ENCOUNTER — DOCUMENTATION ONLY (OUTPATIENT)
Dept: HEMATOLOGY/ONCOLOGY | Facility: CLINIC | Age: 68
End: 2023-05-11
Payer: MEDICARE

## 2023-05-11 NOTE — PROGRESS NOTES
PGx Consult note   REPORT DATE: 5/11/2023 This 67 y.o. patient has a recorded medication list that includes 1 medication that is potentially influenced by the patients genetics.  Specific recommendations are noted below along with future medications to avoid, use with caution, or use with dose modification.   PATIENT NAME: Poli Young    MRN: [id number]    YOB: 1955    SEX: male    MEDICATION ALLERGIES: Patient has no known allergies.    These results should always be considered relative to other clinical indicators and the patients full medication profile. These results comprise only one aspect of the many components used in making clinical treatment decisions. Non-genetic factors can influence patient response to medication and dosage needs. Drug-drug and drug-gene interactions that lead to enzyme inhibition or induction may lead to altered metabolism.  Patients should not make changes to their medications without discussing test results with their healthcare provider.     This patient has 4 pharmacogenes that are considered actionable depending on certain medication use. Based on the patient's recorded medication list, 1 current medication(s) are potentially influenced by assessed patient genetics.     Pharmacogenomics Considerations Based on Current Medication Use    Current medication: Pantoprazole 40 mg daily  Related result: [id number] Intermediate Metabolizer  Recommendation: Initiate standard daily dose. For chronic therapy (>12 weeks) consider 50% reduction of dose due to increased plasma concentration and risk of toxicity.        Pertinent Findings    Mr. Young is a 68 yo male with recent PGx results. Pantoprazole is the only current medication that is affected by his results; see above for details. He has been taking a PPI since at least 6/2022 and could be considered for a dose reduction.     See below for all medication recommendations.    Medications to Use With Caution    CYP2B6  Poor Metabolizer    Efavirenz: Consider initiating efavirenz with decreased dose of 200 or 400 mg/day due to increased risk of CNS adverse effects.    Sertraline: Consider a lower starting dose, slower titration schedule and 25% reduction of standard maintenance dose or select a clinically appropriate alternative antidepressant not predominantly metabolized by CYP2B6.      RKL5S40 Intermediate Metabolizer    PPIs (omeprazole, lansoprazole, and pantoprazole): Initiate standard daily dose; for chronic therapy (>12 weeks) consider 50% reduction of dose due to increased plasma concentration and risk of toxicity.      CY Intermediate Metabolizer    Tacrolimus: Increase starting dose 1.5-2 times recommended starting dose due to lower trough concentrations and decreased chance of achieving target levels. Total starting dose should not exceed 0.3 mg/kg/day.      SLC6A4 Intermediate Responder     SSRIs: SLC6A4 is a target molecule for SSRIs. There are no current guidelines for this interaction but indicates close monitoring for possibly decreased effectiveness and/or increased side effects.    Medications to Avoid    GVK1S41 Intermediate Metabolizer    Clopidogrel: Avoid due to reduced conversion to active metabolite; use prasugrel or ticagrelor if no contraindication.            Genomic Indicators        COMT Intermediate Activity [id number] GA RASHIDA/MET  Updated 2023 by Security, Standard Interface User      COMT is an enzyme that degrades dopamine and norepinephrine, primarily in the prefrontal cortex. 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 The G allele is associated with higher enzyme activity, compared with the A allele. Thus, patients with GA genotype are predicted to have intermediate COMT activity, ie, intermediate dopamine degradation and concentrations, compared to patients with GG genotype. Patients with GA genotype are more likely to respond to psychotropic  medications than those with GG genotype.      Reference Links    Very Important Pharmacogene: COMT                      CY Rapid Metabolizer  Updated 2023 by Security, Standard Interface User      Genomic results predict that patient may metabolize CY substrates at a rate that exceeds average metabolic capacity. Patient may be more susceptible to the effects of enzyme inducers (such as tobacco smoke). The clinical significance of this phenotype is not fully known, and current guidelines provide no clinical recommendations related to CY variation. Commonly used medications metabolized by CY include clozapine and olanzapine. For questions, call [phone] of Classic ([phone] or order PGx Consult [[id number]].               CYP2B6 Poor Metabolizer  Updated 2023 by Security, Standard Interface User      Genomic results predict that this patient may metabolize CYP2B6 substrates at a rate that falls greatly below average metabolic capacity or approaches zero. Thus, this patient may have an increased risk of adverse or poor response to medications metabolized by CYP2B6. To avoid these types of responses, dose adjustments or alternative therapeutic agents may be necessary when medications metabolized by CYP2B6 are being considered. A commonly used medication metabolized by CYP2B6 is efavirenz. For questions, call [phone] of Classic ([phone] or order PGx Consult [[id number]].      Reference Links    CPIC® Guideline for Efavirenz based on CYP2B6 genotype                      KQN4S90 Intermediate Metabolizer  Updated 2023 by Security, Standard Interface User      Genomic results predict that this patient may metabolize UTL9A59 substrates at a rate that falls below average metabolic capacity. Thus, this patient may have an increased risk of adverse or poor response to medications metabolized by SUY5W62. To avoid these types of responses, dose adjustments or alternative therapeutic agents may be necessary when medications  metabolized by ZCW9X03 are being considered. Commonly used medications metabolized by LZW1L22 include the following: amitriptyline, brivaracetam, citalopram, clobazam, clopidogrel, dexlansoprazole, doxepin, escitalopram, lansoprazole, omeprazole, pantoprazole, sertraline, and voriconazole. For questions, call [phone] ([phone] or order PGx Consult [[id number]].      Reference Links    CPIC® Guideline for Clopidogrel and MAX8T24    CPIC® Guideline for Proton Pump Inhibitors and CAJ3L93    CPIC® Guideline for Voriconazole and ZET2W97    CPIC® Guideline for Selective Serotonin Reuptake Inhibitors and CYP2D6 and PBN1T80    CPIC® Guideline for Tricyclic Antidepressants and CYP2D6 and YGY3L99                      CYP2C9 Normal Metabolizer  Updated 5/8/2023 by Security, Standard Interface User      Genomic results predict that this patient may metabolize CYP2C9 substrates at a rate consistent with average metabolic capacity. Thus, there is no clear indication for selective dose adjustment for most medications metabolized by CYP2C9. Commonly used medications metabolized by CYP2C9 include the following: celecoxib, flurbiprofen, fosphenytoin, ibuprofen, lornoxicam, meloxicam, phenytoin, piroxicam, and warfarin. For questions, call [phone] ([phone] or order PGx Consult [[id number]].      Reference Links    CPIC® Guideline for NSAIDs based on CYP2C9 genotype    CPIC® Guideline for Phenytoin and CYP2C9 and HLA-B    CPIC® Guideline for Pharmacogenetics-Guided Warfarin Dosing                      CYP2D6 Normal Metabolizer  Updated 5/8/2023 by Security, Standard Interface User      Genomic results predict that this patient may metabolize CYP2D6 substrates at a rate consistent with average metabolic capacity. Thus, there is no clear indication for selective dose adjustment for most medications metabolized by CYP2D6. For questions, call [phone] ([phone] or order PGx Consult [[id number]].      Reference Links    CPIC® Guideline for  Ondansetron and Tropisetron based on CYP2D6 genotype    CPIC® Guideline for Atomoxetine based on CYP2D6 genotype    CPIC® Guideline for Selective Serotonin Reuptake Inhibitors and CYP2D6 and [id number]    CPIC® Guideline for Tricyclic Antidepressants and CYP2D6 and NCE2U70    CPIC® Guideline for Opioids and CYP2D6, OPRM1, and COMT    CPIC® Guideline for Tamoxifen based on CYP2D6 genotype                      CY Normal Metabolizer  Updated 2023 by Security, Standard Interface User      CY is involved in the metabolism of nearly 50% of all prescribed medications. Genomic results predict that patient may metabolize CY substrates at rates consistent with average metabolic capacity. Current guidelines provide no clinical recommendations related to CY variations or reasons to selectively adjust dose of medications metabolized (inactivated or activated) by CY. Related literature is evolving. Commonly used medication metabolized by CY include atorvastatin, lovastatin, simvastatin and tacrolimus. For questions, call [phone] ([phone] or order PGx Consult [[id number]].      Reference Links    Very Important Pharmacogene: CY                      CY Intermediate Metabolizer  Updated 2023 by Security, Standard Interface User      Genomic results predict that this patient may metabolize CY substrates at a rate that falls below metabolic capacity.  For some populations, absence of functional CY is the normal state.  Patients with this genotype may require higher starting doses of certain medications metabolized by CY, including tacrolimus.  Please request a PGx consult for more information about patient CY metabolizer status or implications related to drug selection or dosing.      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 This information may be used in assessing patient sensitivity to warfarin; however, warfarin effect is also influenced by decreases in CYP2C9 function and VKORC1 status. To avoid risk of adverse or poor response to warfarin, genomic results for CYP2C9, VKORC1 function, and CYP2C cluster should be considered before initial dose determination or adjustment. Warfarin dosing algorithms are now available that incorporate CYP2C9, VKORC1, and CYP4F2 function ([email or web address].org). For questions, call [phone] ([phone]) or order PGx Consult [[id number]].      Reference Links    CPIC® Guideline for Pharmacogenetics-Guided Warfarin Dosing                      DPYD Normal Metabolizer  Updated 5/8/2023 by Security, Standard Interface User      Genomic results predict that this patient may metabolize DPYD substrates at a rate consistent with average metabolic capacity (activity score = 2). Thus, there is no clear indication for selective dose adjustment for most medications metabolized by DPYD. Commonly used medications metabolized by DPYD include 5-fluorouracil and capecitabine. For questions, call [phone] ([phone]) or order PGx Consult [[id number]].      Reference Links    CPIC® Guideline for Fluoropyrimidines and DPYD                      DRD2 Reduced Expression [id number] GA  Updated 5/8/2023 by Security, Standard Interface User      Genomic results indicate that patient is heterozygous for the G allele (GA) and may have reduced expression of the dopamine receptor D2. DRD2 polymorphisms are only one of many factors that contribute to a patient's clinical picture and response to medication. The utility of DRD2 genotyping is currently limited in terms of risk prediction and medication selection. For questions, call [phone] ([phone]) or order PGx Consult [[id number]].      Reference Links    SNPedia: rs [id number]                       F2 Normal Thrombosis Risk (GG)  Updated 5/8/2023 by Security, Standard Interface User      F2 genotype may be useful in identifying increased risk of thrombosis due to prothrombin thrombophilia. Genomic results indicate that patient does not have variant c.*97G>A (formerly known as [id number]). This finding suggests no increase in genetic risk for thromboembolic events in patients taking oral contraceptives. For questions, call [phone] ([phone]) or order PGx Consult [[id number]].               F5 Normal Thrombosis Risk (GG)  Updated 5/8/2023 by Security, Standard Interface User      F5 genotype may be useful in identifying increased risk of thrombosis due to Factor 5 Leiden thrombophilia. Genomic results indicate that patient does not have variant c.1601G>A (formerly known as Leiden, K2857G or R506Q). This finding suggests no increase in genetic risk for thromboembolic events in patients taking oral contraceptives. For questions, call [phone] ([phone]) or order PGx Consult [[id number]].               GRIK4 Normal Receptor Function [id number] CC  Updated 5/8/2023 by Security, Standard Interface User      GRIK4 is a protein involved in the transmission of certain signals in the brain. Genomic results indicate that patient is homozygous for the C allele (CC). GRIK4 polymorphisms are only one of many factors that contribute to a patient's clinical picture and response to medication; thus, the utility of GRIK4 genotyping is currently limited in terms of risk prediction and medication selection. Some evidence suggests that patients with genotype CC have an increased likelihood of responding to certain antidepressants, compared to patients with genotype TT. For questions, call [phone] ([phone]) or order PGx Consult [[id number]].      Reference Links    SNPedia: [id number]    [employer] Clinical Annotation for [id number] (GRIK4)                      HLA-A*31:01 Not Detected  Updated 5/8/2023 by Security, Standard Interface User       Genomic results indicate that this patient is negative for HLA-A*31:01. When present, the HLA-A*31:01 allele is associated with severe cutaneous adverse reactions among patients taking certain aromatic anticonvulsants, including carbamazepine. No change in therapy is warranted based on these results. Note, however, that a negative HLA-A*31:01 result does not absolutely rule out the possibility of an adverse drug reaction. Results for HLA-B*15:02 should be considered. For questions, call [phone] Traffic & Transport TechnologyGENE ([phone]) or order PGx Consult [[id number]].      Reference Links    CPIC® Guideline for HLA genotype and Use of Carbamazepine and Oxcarbazepine                      HLA-B*15:02 Not Detected  Updated 5/8/2023 by Security, Standard Interface User      Genomic results indicate that this patient is negative for HLA-B*15:02. The HLA-B*15:02 allele is associated with severe cutaneous adverse reactions among patients taking certain aromatic anticonvulsants, including carbamazepine, oxcarbazepine, and phenytoin or fosphenytoin. No change in therapy is warranted based on these results. Note, however, that a negative HLA-B*15:02 result does not absolutely rule out the possibility of an adverse drug reaction. Results for HLA-A*31:01 should also be considered. For questions, call [phone] ([phone]) or order PGx Consult [[id number]].      Reference Links    CPIC® Guideline for Phenytoin and CYP2C9 and HLA-B    CPIC® Guideline for HLA genotype and Use of Carbamazepine and Oxcarbazepine    CPIC® Guideline for Abacavir and HLA-B    CPIC® Guideline for Allopurinol and HLA-B                      HLA-B*57:01 Not Detected  Updated 5/8/2023 by Security, Standard Interface User      Genomic results indicate that this patient is negative for HLA-B*57:01. Based on this result, abacavir may be prescribed. Refer to formulary dosing guidelines. A negative HLA-B*57:01 result does not absolutely rule out the possibility of some form of abacavir  hypersensitivity. Administration of abacavir requires close observation including immediate discontinuation of therapy should any signs or symptoms of hypersensitivity develop. For questions, call [phone] ([phone]) or order PGx Consult [[id number]].      Reference Links    CPIC® Guideline for Abacavir and HLA-B                      HLA-B*58:01 Not Detected  Updated 5/8/2023 by Security, Standard Interface User      Genomic results indicate that this patient is negative for HLA-B*58:01. Based on this result, allopurinol may be prescribed or continued. A negative HLA-B*58:01 result does not rule out the possibility of some form of allopurinol hypersensitivity. For questions, call [phone] ([phone]) or order PGx Consult [[id number]].      Reference Links    CPIC® Guideline for Allopurinol and HLA-B                      HTR2A Intron 2 Genotype GG ([id number])  Updated 5/8/2023 by Security, Standard Interface User      Genomic results indicate that patient is homozygous for the G allele (GG). This genotype may predict altered HTR2A (serotonin or 5-hydroxytryptamine receptor 2A) receptor function. HTR2A polymorphisms are only one of many factors that contribute to a patient's clinical picture and response to medication. The usefulness of this result is currently limited in terms of predicting risk and guiding medication selection; however, some evidence suggests that GA and GG variations may be associated with increase response to certain antidepressants, compared with AA genotype. For questions, call [phone] ([phone]) or order PGx Consult [[id number]].               HTR2C Normal Influence [id number] CC  Updated 5/8/2023 by Security, Standard Interface User      HTR2C (serotonin or 5-hydroxytryptamine receptor 2C) plays a role in the satiety pathway. Genomic results indicate that patient is homozygous for the wild-type C allele (CC). The usefulness of this result is currently limited in terms of predicting risk and guiding  medication selection. Some evidence suggests that CC genotype is associated with risk of weight gain in patients taking certain antipsychotics, but contradictory evidence exists. Other genetic and clinical factors may also influence risk of weight gain. For questions, call [phone] ([phone]) or order PGx Consult [[id number]].               IFNL3 (IL28B) [id number] T/T  Updated 5/8/2023 by Security, Standard Interface User      Genomic results predict that this patient has a decreased likelihood of response (lower sustained virologic response [SVR] rate) to PEG-IFN-a and ribavirin (RBV) combination therapy when used to treat hepatitis C virus (HCV) genotype 1 infections (compared to patients with favorable-response genotype). IFNL3 genotype is only one factor that can influence response rates to PEG-IFN-a and RBV therapy in HCV genotype 1 infection and should be interpreted in the context of other clinical and genetic factors. PEG-IFN-a, pegylated interferon-a 2a or 2b. For questions, call [phone] ([phone] or order PGx Consult [[id number]].      Reference Links    CPIC® Guideline for PEG Interferon-Alpha-Based Regimens and IFNL3                      MTHFR Normal Activity  Updated 5/8/2023 by Security, Standard Interface User      Genomic results predict that patient has normal MTHFR activity, thus, normal conversion of folic acid to methylated folate (the active form of folate) is expected. Other genetic and/or clinical factors may influence the folate cycle. The usefulness of this result is currently limited in terms of predicting risk and guiding medication selection. For questions, call [phone] ([phone]) or order PGx Consult [[id number]].      Reference Links    Very Important Pharmacogene: MTHFR                      NUDT15 Normal Metabolizer  Updated 5/8/2023 by Security, Standard Interface User      Genomic results predict that this patient may metabolize NUDT15 substrates, including thiopurines, at a rate consistent  with average metabolic capacity. A normal risk of drug-related leukopenia, neutropenia, and/or myelosuppression is predicted. While there is no clear indication for thiopurine dose adjustment based on NUDT15 function, dose adjustment may be recommended based on TPMT function. A TPMT metabolizer status may be available for this patient. For questions, call [phone] ([phone] or order PGx Consult [[id number]].      Reference Links    CPIC® Guideline for Thiopurines and TPMT and NUDT15                      OPRM1 Normal Opioid Responder ([id number]) AA  Updated 5/8/2023 by Security, Standard Interface User      The opioid receptor mu 1 (OPRM1) gene has more than 200 known variations; the [id number] variant has been studied for its role in opioid response. Genomic results indicate that patient is homozygous for the A allele (AA) of the c.397A>G polymorphism. This genotype is consistent with a need for average doses of opioids to achieve the desired therapeutic effect at opioid initiation; however, results for CYP2D6 should also be considered. For questions, call [phone] ([phone] or order PGx Consult [[id number]].      Reference Links    Clinical Pharmacogenetics Implementation Consortium (CPIC) guideline for CYP2D6, OPRM1, and COMT genotype and select opioid therapy (December 2020)                      SLC6A4 Intermediate Responder  Updated 5/8/2023 by Security, Standard Interface User      SLC6A4 plays a role in the synaptic reuptake of serotonin. Genomic results indicate that patient has 1 copy of the long-form (LA or LG) promoter polymorphism and 1 copy of the short-form (S) promoter polymorphism. Patients with this result may have an increased risk of drug-induced side effects or may be less likely to achieve remission of depression with selective serotonin reuptake inhibitors (SSRIs). Caution is recommended when initiating or discontinuing SSRIs in this patient. Other genetic and clinical factors may also influence  medication response. For questions, call [phone] ([phone] or order PGx Consult [[id number]].      Reference Links    [employer] SLC6A4 Clinical Annotations                      QMCD2A3 Normal Function  Updated 5/8/2023 by Security, Standard Interface User      Genomic results predict that this patient may have normal LQYI5H3 function. Thus, there is no clear indication for selective dose adjustment for most medications transported/affected by PFKF6T7, including simvastatin. For questions, call [phone] ([phone] or order PGx Consult [[id number]].      Reference Links    CPIC® Guideline for Simvastatin and WKLQ9Q3                      TPMT Normal Metabolizer  Updated 5/8/2023 by Security, Standard Interface User      Genomic results predict that this patient may metabolize TPMT substrates, including thiopurines, at a rate consistent with average metabolic capacity. A normal risk of drug-related leukopenia, neutropenia, and/or myelosuppression is predicted. While there is no clear indication for thiopurine dose adjustment based on TPMT function, dose adjustment may be recommended based on NUDT15 function. A NUDT15 metabolizer status may be available for this patient. For questions, call [phone] ([phone] or order PGx Consult [[id number]].      Reference Links    CPIC® Guideline for Thiopurines and TPMT and NUDT15                      UGT1A1 Intermediate Metabolizer  Updated 5/8/2023 by Security, Standard Interface User      Genomic results predict that this patient may metabolize UGT1A1 substrates at a rate that falls below average metabolic capacity. Current guidance provides no recommendations to selectively adjust the dose of specific medications metabolized by UGT1A1. UGT1A1 intermediate metabolizers may experience increased bilirubin levels and related jaundice when taking medications that inhibit UGT1A1, including atazanavir and nilotinib. For questions, call [phone] ([phone] or order PGx Consult [[id number]].       Reference Links    CPIC® Guideline for Atazanavir and UGT1A1                      VKORC1 [id number] G/G  Updated 5/8/2023 by Security, Standard Interface User      Genomic results predict that this patient may have normal sensitivity to warfarin based on VKORC1 results. However, warfarin effect is also influenced by decreases in CYP2C9 function; thus, an increased risk of adverse or poor response upon initiation of therapy is not ruled out with this result. To avoid risk of adverse or poor response to warfarin, CYP2C9 functionality should be considered before initial dose determination or adjustment. Note: Some dosing algorithms for warfarin initiation also incorporate the CYP4F2 gene and CYP2C cluster variant. For questions, call [phone] ([phone]) or order PGx Consult [[id number]].      Reference Links    CPIC® Guideline for Pharmacogenetics-Guided Warfarin Dosing                           This summary is based on genetic results provided by [facility] and its affiliated laboratories. For questions related to testing methods, please request a PGx consult or email your questions to [email or web address].

## 2023-05-15 DIAGNOSIS — D50.9 IRON DEFICIENCY ANEMIA, UNSPECIFIED IRON DEFICIENCY ANEMIA TYPE: ICD-10-CM

## 2023-05-15 RX ORDER — SODIUM CHLORIDE 0.9 % (FLUSH) 0.9 %
10 SYRINGE (ML) INJECTION
Status: CANCELLED | OUTPATIENT
Start: 2023-05-16

## 2023-05-15 RX ORDER — EPINEPHRINE 0.3 MG/.3ML
0.3 INJECTION SUBCUTANEOUS ONCE AS NEEDED
Status: CANCELLED | OUTPATIENT
Start: 2023-05-16

## 2023-05-15 RX ORDER — HEPARIN 100 UNIT/ML
500 SYRINGE INTRAVENOUS
Status: CANCELLED | OUTPATIENT
Start: 2023-05-16

## 2023-05-15 RX ORDER — FLUOROURACIL 50 MG/ML
400 INJECTION, SOLUTION INTRAVENOUS
Status: CANCELLED | OUTPATIENT
Start: 2023-05-16

## 2023-05-15 RX ORDER — HEPARIN 100 UNIT/ML
500 SYRINGE INTRAVENOUS
Status: CANCELLED | OUTPATIENT
Start: 2023-05-23

## 2023-05-15 RX ORDER — SODIUM CHLORIDE 0.9 % (FLUSH) 0.9 %
10 SYRINGE (ML) INJECTION
Status: CANCELLED | OUTPATIENT
Start: 2023-05-18

## 2023-05-15 RX ORDER — HEPARIN 100 UNIT/ML
500 SYRINGE INTRAVENOUS
Status: CANCELLED | OUTPATIENT
Start: 2023-05-18

## 2023-05-15 RX ORDER — SODIUM CHLORIDE 0.9 % (FLUSH) 0.9 %
10 SYRINGE (ML) INJECTION
Status: CANCELLED | OUTPATIENT
Start: 2023-05-23

## 2023-05-15 RX ORDER — DIPHENHYDRAMINE HYDROCHLORIDE 50 MG/ML
50 INJECTION INTRAMUSCULAR; INTRAVENOUS ONCE AS NEEDED
Status: CANCELLED | OUTPATIENT
Start: 2023-05-16

## 2023-05-16 ENCOUNTER — INFUSION (OUTPATIENT)
Dept: INFUSION THERAPY | Facility: HOSPITAL | Age: 68
End: 2023-05-16
Attending: INTERNAL MEDICINE
Payer: MEDICARE

## 2023-05-16 VITALS
SYSTOLIC BLOOD PRESSURE: 124 MMHG | HEART RATE: 91 BPM | OXYGEN SATURATION: 98 % | DIASTOLIC BLOOD PRESSURE: 79 MMHG | RESPIRATION RATE: 18 BRPM

## 2023-05-16 DIAGNOSIS — C17.9 ADENOCARCINOMA OF SMALL BOWEL: Primary | ICD-10-CM

## 2023-05-16 PROCEDURE — 96367 TX/PROPH/DG ADDL SEQ IV INF: CPT

## 2023-05-16 PROCEDURE — 96411 CHEMO IV PUSH ADDL DRUG: CPT

## 2023-05-16 PROCEDURE — 63600150 PHARM REV CODE 636: Performed by: INTERNAL MEDICINE

## 2023-05-16 PROCEDURE — 25000003 PHARM REV CODE 250: Performed by: INTERNAL MEDICINE

## 2023-05-16 PROCEDURE — 96413 CHEMO IV INFUSION 1 HR: CPT

## 2023-05-16 PROCEDURE — 96416 CHEMO PROLONG INFUSE W/PUMP: CPT

## 2023-05-16 PROCEDURE — 96366 THER/PROPH/DIAG IV INF ADDON: CPT

## 2023-05-16 PROCEDURE — 96415 CHEMO IV INFUSION ADDL HR: CPT

## 2023-05-16 PROCEDURE — 63600175 PHARM REV CODE 636 W HCPCS: Performed by: INTERNAL MEDICINE

## 2023-05-16 PROCEDURE — 96368 THER/DIAG CONCURRENT INF: CPT

## 2023-05-16 RX ORDER — SODIUM CHLORIDE 0.9 % (FLUSH) 0.9 %
10 SYRINGE (ML) INJECTION
Status: DISCONTINUED | OUTPATIENT
Start: 2023-05-16 | End: 2023-05-16 | Stop reason: HOSPADM

## 2023-05-16 RX ORDER — HEPARIN 100 UNIT/ML
500 SYRINGE INTRAVENOUS
Status: DISCONTINUED | OUTPATIENT
Start: 2023-05-16 | End: 2023-05-16 | Stop reason: HOSPADM

## 2023-05-16 RX ORDER — FLUOROURACIL 50 MG/ML
400 INJECTION, SOLUTION INTRAVENOUS
Status: COMPLETED | OUTPATIENT
Start: 2023-05-16 | End: 2023-05-16

## 2023-05-16 RX ADMIN — LEUCOVORIN CALCIUM 830 MG: 10 INJECTION INTRAMUSCULAR; INTRAVENOUS at 11:05

## 2023-05-16 RX ADMIN — FLUOROURACIL 830 MG: 50 INJECTION, SOLUTION INTRAVENOUS at 01:05

## 2023-05-16 RX ADMIN — OXALIPLATIN 177 MG: 5 INJECTION, SOLUTION INTRAVENOUS at 11:05

## 2023-05-16 RX ADMIN — FLUOROURACIL 4990 MG: 50 INJECTION, SOLUTION INTRAVENOUS at 02:05

## 2023-05-16 RX ADMIN — DEXAMETHASONE SODIUM PHOSPHATE 0.25 MG: 10 INJECTION, SOLUTION INTRAMUSCULAR; INTRAVENOUS at 11:05

## 2023-05-16 NOTE — PLAN OF CARE
Pt arrived to unit for C1D1 of FOLFOX. Pt and spouse oriented to unit. Chemo consent obtained and signed. Pt had labs done last week. PAC was placed about 2 weeks ago. Steri strips still in place and incision healing well. Pt denies any complaints. VSS. Any potential side effects or adverse reactions reviewed. Pt given pre-med of Aloxi/Dex. Oxaliplatin infused over 2 hours with Leucovorin infused concurrently. Pt tolerated well. INFUSYSTEM consent signed by patient. Pt and spouse also watched the informative video about the 5FU pump. Pt given his 5FU bolus over 5 minutes. 5FU pump connected at 1405. Pt and spouse instructed to call the 1800 number on the side if any issues with the pump occur. Also given the number for our direct unit if the pump needs to be troubleshooted. Pt and spouse instructed to return on Thursday, 5/18, at 12PM for his first Injectafer and his pump d/c. Verbalized understanding. Pt discharged from unit in NAD.

## 2023-05-18 ENCOUNTER — INFUSION (OUTPATIENT)
Dept: INFUSION THERAPY | Facility: HOSPITAL | Age: 68
End: 2023-05-18
Attending: INTERNAL MEDICINE
Payer: MEDICARE

## 2023-05-18 VITALS
OXYGEN SATURATION: 97 % | SYSTOLIC BLOOD PRESSURE: 139 MMHG | RESPIRATION RATE: 18 BRPM | HEART RATE: 78 BPM | DIASTOLIC BLOOD PRESSURE: 76 MMHG | TEMPERATURE: 98 F

## 2023-05-18 DIAGNOSIS — D50.9 IRON DEFICIENCY ANEMIA, UNSPECIFIED IRON DEFICIENCY ANEMIA TYPE: Primary | ICD-10-CM

## 2023-05-18 DIAGNOSIS — C17.9 ADENOCARCINOMA OF SMALL BOWEL: ICD-10-CM

## 2023-05-18 LAB
DNA RANGE(S) EXAMINED NAR: NORMAL
GENE DIS ANL INTERP-IMP: POSITIVE
GENE DIS ASSESSED: NORMAL
GENE MUT TESTED BLD/T: 8.9 M/MB
MLH1+MSH2+MSH6+PMS2 GN DEL+DUP+FUL M: NORMAL
MMR ENDO PMS2 CA SPEC QL IMSTN: PRESENT
MMR PROT MLH1 CA SPEC QL IMSTN: PRESENT
MMR PROT MSH2 CA SPEC QL IMSTN: PRESENT
MMR PROT MSH6 CA SPEC QL IMSTN: PRESENT
MSI CA SPEC-IMP: NORMAL
REASON FOR STUDY: NORMAL
TEMPUS FUSIONADDENDUM: NORMAL
TEMPUS LCA: NORMAL
TEMPUS PD-L1 (22C3) COMBINED POSITIVE SCORE: 8
TEMPUS PD-L1 (22C3) TUMOR PROPORTION SCORE: <1 %
TEMPUS PORTAL: NORMAL
TEMPUS TRIAL1: NORMAL
TEMPUS TRIAL2: NORMAL
TEMPUS TRIAL3: NORMAL
TEMPUS TRIALCOUNT: 3

## 2023-05-18 PROCEDURE — A4216 STERILE WATER/SALINE, 10 ML: HCPCS | Performed by: INTERNAL MEDICINE

## 2023-05-18 PROCEDURE — 63600175 PHARM REV CODE 636 W HCPCS: Performed by: INTERNAL MEDICINE

## 2023-05-18 PROCEDURE — 25000003 PHARM REV CODE 250: Performed by: INTERNAL MEDICINE

## 2023-05-18 PROCEDURE — 96365 THER/PROPH/DIAG IV INF INIT: CPT

## 2023-05-18 RX ORDER — HEPARIN 100 UNIT/ML
500 SYRINGE INTRAVENOUS
Status: DISCONTINUED | OUTPATIENT
Start: 2023-05-18 | End: 2023-05-18 | Stop reason: HOSPADM

## 2023-05-18 RX ORDER — SODIUM CHLORIDE 0.9 % (FLUSH) 0.9 %
10 SYRINGE (ML) INJECTION
Status: DISCONTINUED | OUTPATIENT
Start: 2023-05-18 | End: 2023-05-18 | Stop reason: HOSPADM

## 2023-05-18 RX ADMIN — HEPARIN 500 UNITS: 100 SYRINGE at 12:05

## 2023-05-18 RX ADMIN — FERRIC CARBOXYMALTOSE INJECTION 750 MG: 50 INJECTION, SOLUTION INTRAVENOUS at 11:05

## 2023-05-18 RX ADMIN — Medication 10 ML: at 12:05

## 2023-05-18 NOTE — PLAN OF CARE
Pt arrived to unit for his C1D3 for his pump d/c and first ordered Injectafer. Pt denies having any issues with his 5FU pump. Endorses some mild stomach pain that subsided but otherwise voices no complaints today. Per 5FU pump, fluorouracil completed. Injectafer infused with a post-infusion observation period completed. No issues noted. Pt received discharge instructions along with his next appointments. Discharged from unit in NAD.

## 2023-05-19 DIAGNOSIS — K63.89 SMALL BOWEL MASS: ICD-10-CM

## 2023-05-19 DIAGNOSIS — C17.9 ADENOCARCINOMA OF SMALL BOWEL: Primary | ICD-10-CM

## 2023-05-25 LAB
DNA RANGE(S) EXAMINED NAR: NORMAL
GENE DIS ANL INTERP-IMP: NORMAL
GENE DIS ASSESSED: NORMAL
REASON FOR STUDY: NORMAL
TEMPUS PORTAL: NORMAL

## 2023-05-30 ENCOUNTER — OFFICE VISIT (OUTPATIENT)
Dept: HEMATOLOGY/ONCOLOGY | Facility: CLINIC | Age: 68
End: 2023-05-30
Payer: MEDICARE

## 2023-05-30 ENCOUNTER — INFUSION (OUTPATIENT)
Dept: INFUSION THERAPY | Facility: HOSPITAL | Age: 68
End: 2023-05-30
Attending: INTERNAL MEDICINE
Payer: MEDICARE

## 2023-05-30 VITALS
SYSTOLIC BLOOD PRESSURE: 147 MMHG | HEART RATE: 81 BPM | OXYGEN SATURATION: 97 % | HEIGHT: 72 IN | DIASTOLIC BLOOD PRESSURE: 81 MMHG | BODY MASS INDEX: 25.95 KG/M2 | WEIGHT: 191.56 LBS

## 2023-05-30 VITALS
HEART RATE: 64 BPM | OXYGEN SATURATION: 98 % | SYSTOLIC BLOOD PRESSURE: 160 MMHG | RESPIRATION RATE: 18 BRPM | DIASTOLIC BLOOD PRESSURE: 75 MMHG | TEMPERATURE: 98 F

## 2023-05-30 DIAGNOSIS — Z79.899 IMMUNODEFICIENCY SECONDARY TO CHEMOTHERAPY: ICD-10-CM

## 2023-05-30 DIAGNOSIS — C17.9 ADENOCARCINOMA OF SMALL BOWEL: Primary | ICD-10-CM

## 2023-05-30 DIAGNOSIS — D50.9 IRON DEFICIENCY ANEMIA, UNSPECIFIED IRON DEFICIENCY ANEMIA TYPE: ICD-10-CM

## 2023-05-30 DIAGNOSIS — R91.8 PULMONARY NODULES: ICD-10-CM

## 2023-05-30 DIAGNOSIS — D84.821 IMMUNODEFICIENCY SECONDARY TO CHEMOTHERAPY: ICD-10-CM

## 2023-05-30 DIAGNOSIS — T45.1X5A IMMUNODEFICIENCY SECONDARY TO CHEMOTHERAPY: ICD-10-CM

## 2023-05-30 DIAGNOSIS — E04.1 THYROID NODULE: ICD-10-CM

## 2023-05-30 PROCEDURE — 96367 TX/PROPH/DG ADDL SEQ IV INF: CPT

## 2023-05-30 PROCEDURE — 4010F ACE/ARB THERAPY RXD/TAKEN: CPT | Mod: CPTII,S$GLB,, | Performed by: INTERNAL MEDICINE

## 2023-05-30 PROCEDURE — 3288F FALL RISK ASSESSMENT DOCD: CPT | Mod: CPTII,S$GLB,, | Performed by: INTERNAL MEDICINE

## 2023-05-30 PROCEDURE — 4010F PR ACE/ARB THEARPY RXD/TAKEN: ICD-10-PCS | Mod: CPTII,S$GLB,, | Performed by: INTERNAL MEDICINE

## 2023-05-30 PROCEDURE — 96415 CHEMO IV INFUSION ADDL HR: CPT

## 2023-05-30 PROCEDURE — 99999 PR PBB SHADOW E&M-EST. PATIENT-LVL III: CPT | Mod: PBBFAC,,, | Performed by: INTERNAL MEDICINE

## 2023-05-30 PROCEDURE — 96413 CHEMO IV INFUSION 1 HR: CPT

## 2023-05-30 PROCEDURE — 63600175 PHARM REV CODE 636 W HCPCS: Performed by: INTERNAL MEDICINE

## 2023-05-30 PROCEDURE — 96368 THER/DIAG CONCURRENT INF: CPT

## 2023-05-30 PROCEDURE — 1126F AMNT PAIN NOTED NONE PRSNT: CPT | Mod: CPTII,S$GLB,, | Performed by: INTERNAL MEDICINE

## 2023-05-30 PROCEDURE — 99999 PR PBB SHADOW E&M-EST. PATIENT-LVL III: ICD-10-PCS | Mod: PBBFAC,,, | Performed by: INTERNAL MEDICINE

## 2023-05-30 PROCEDURE — 96416 CHEMO PROLONG INFUSE W/PUMP: CPT

## 2023-05-30 PROCEDURE — 3288F PR FALLS RISK ASSESSMENT DOCUMENTED: ICD-10-PCS | Mod: CPTII,S$GLB,, | Performed by: INTERNAL MEDICINE

## 2023-05-30 PROCEDURE — 3079F PR MOST RECENT DIASTOLIC BLOOD PRESSURE 80-89 MM HG: ICD-10-PCS | Mod: CPTII,S$GLB,, | Performed by: INTERNAL MEDICINE

## 2023-05-30 PROCEDURE — 1126F PR PAIN SEVERITY QUANTIFIED, NO PAIN PRESENT: ICD-10-PCS | Mod: CPTII,S$GLB,, | Performed by: INTERNAL MEDICINE

## 2023-05-30 PROCEDURE — 99214 PR OFFICE/OUTPT VISIT, EST, LEVL IV, 30-39 MIN: ICD-10-PCS | Mod: S$GLB,,, | Performed by: INTERNAL MEDICINE

## 2023-05-30 PROCEDURE — 3077F SYST BP >= 140 MM HG: CPT | Mod: CPTII,S$GLB,, | Performed by: INTERNAL MEDICINE

## 2023-05-30 PROCEDURE — 1101F PT FALLS ASSESS-DOCD LE1/YR: CPT | Mod: CPTII,S$GLB,, | Performed by: INTERNAL MEDICINE

## 2023-05-30 PROCEDURE — 1101F PR PT FALLS ASSESS DOC 0-1 FALLS W/OUT INJ PAST YR: ICD-10-PCS | Mod: CPTII,S$GLB,, | Performed by: INTERNAL MEDICINE

## 2023-05-30 PROCEDURE — 3079F DIAST BP 80-89 MM HG: CPT | Mod: CPTII,S$GLB,, | Performed by: INTERNAL MEDICINE

## 2023-05-30 PROCEDURE — 96411 CHEMO IV PUSH ADDL DRUG: CPT

## 2023-05-30 PROCEDURE — 3077F PR MOST RECENT SYSTOLIC BLOOD PRESSURE >= 140 MM HG: ICD-10-PCS | Mod: CPTII,S$GLB,, | Performed by: INTERNAL MEDICINE

## 2023-05-30 PROCEDURE — 3008F BODY MASS INDEX DOCD: CPT | Mod: CPTII,S$GLB,, | Performed by: INTERNAL MEDICINE

## 2023-05-30 PROCEDURE — 3008F PR BODY MASS INDEX (BMI) DOCUMENTED: ICD-10-PCS | Mod: CPTII,S$GLB,, | Performed by: INTERNAL MEDICINE

## 2023-05-30 PROCEDURE — 99214 OFFICE O/P EST MOD 30 MIN: CPT | Mod: S$GLB,,, | Performed by: INTERNAL MEDICINE

## 2023-05-30 PROCEDURE — 25000003 PHARM REV CODE 250: Performed by: INTERNAL MEDICINE

## 2023-05-30 RX ORDER — EPINEPHRINE 0.3 MG/.3ML
0.3 INJECTION SUBCUTANEOUS ONCE AS NEEDED
Status: DISCONTINUED | OUTPATIENT
Start: 2023-05-30 | End: 2023-05-30 | Stop reason: HOSPADM

## 2023-05-30 RX ORDER — FLUOROURACIL 50 MG/ML
2400 INJECTION, SOLUTION INTRAVENOUS
Status: CANCELLED | OUTPATIENT
Start: 2023-05-30

## 2023-05-30 RX ORDER — SODIUM CHLORIDE 0.9 % (FLUSH) 0.9 %
10 SYRINGE (ML) INJECTION
Status: CANCELLED | OUTPATIENT
Start: 2023-05-30

## 2023-05-30 RX ORDER — EPINEPHRINE 0.3 MG/.3ML
0.3 INJECTION SUBCUTANEOUS ONCE AS NEEDED
Status: CANCELLED | OUTPATIENT
Start: 2023-05-30

## 2023-05-30 RX ORDER — SODIUM CHLORIDE 0.9 % (FLUSH) 0.9 %
10 SYRINGE (ML) INJECTION
Status: DISCONTINUED | OUTPATIENT
Start: 2023-05-30 | End: 2023-05-30 | Stop reason: HOSPADM

## 2023-05-30 RX ORDER — DIPHENHYDRAMINE HYDROCHLORIDE 50 MG/ML
50 INJECTION INTRAMUSCULAR; INTRAVENOUS ONCE AS NEEDED
Status: DISCONTINUED | OUTPATIENT
Start: 2023-05-30 | End: 2023-05-30 | Stop reason: HOSPADM

## 2023-05-30 RX ORDER — HEPARIN 100 UNIT/ML
500 SYRINGE INTRAVENOUS
Status: DISCONTINUED | OUTPATIENT
Start: 2023-05-30 | End: 2023-05-30 | Stop reason: HOSPADM

## 2023-05-30 RX ORDER — FLUOROURACIL 50 MG/ML
400 INJECTION, SOLUTION INTRAVENOUS
Status: COMPLETED | OUTPATIENT
Start: 2023-05-30 | End: 2023-05-30

## 2023-05-30 RX ORDER — SODIUM CHLORIDE 0.9 % (FLUSH) 0.9 %
10 SYRINGE (ML) INJECTION
Status: CANCELLED | OUTPATIENT
Start: 2023-06-01

## 2023-05-30 RX ORDER — DIPHENHYDRAMINE HYDROCHLORIDE 50 MG/ML
50 INJECTION INTRAMUSCULAR; INTRAVENOUS ONCE AS NEEDED
Status: CANCELLED | OUTPATIENT
Start: 2023-05-30

## 2023-05-30 RX ORDER — HEPARIN 100 UNIT/ML
500 SYRINGE INTRAVENOUS
Status: CANCELLED | OUTPATIENT
Start: 2023-06-01

## 2023-05-30 RX ORDER — HEPARIN 100 UNIT/ML
500 SYRINGE INTRAVENOUS
Status: CANCELLED | OUTPATIENT
Start: 2023-05-30

## 2023-05-30 RX ORDER — FLUOROURACIL 50 MG/ML
400 INJECTION, SOLUTION INTRAVENOUS
Status: CANCELLED | OUTPATIENT
Start: 2023-05-30

## 2023-05-30 RX ADMIN — FLUOROURACIL 830 MG: 50 INJECTION, SOLUTION INTRAVENOUS at 01:05

## 2023-05-30 RX ADMIN — SODIUM CHLORIDE: 9 INJECTION, SOLUTION INTRAVENOUS at 09:05

## 2023-05-30 RX ADMIN — FLUOROURACIL 4990 MG: 50 INJECTION, SOLUTION INTRAVENOUS at 01:05

## 2023-05-30 RX ADMIN — DEXTROSE MONOHYDRATE: 5 INJECTION, SOLUTION INTRAVENOUS at 10:05

## 2023-05-30 RX ADMIN — OXALIPLATIN 177 MG: 5 INJECTION, SOLUTION INTRAVENOUS at 11:05

## 2023-05-30 RX ADMIN — FERRIC CARBOXYMALTOSE INJECTION 750 MG: 50 INJECTION, SOLUTION INTRAVENOUS at 09:05

## 2023-05-30 RX ADMIN — DEXAMETHASONE SODIUM PHOSPHATE 0.25 MG: 10 INJECTION, SOLUTION INTRAMUSCULAR; INTRAVENOUS at 10:05

## 2023-05-30 RX ADMIN — LEUCOVORIN CALCIUM 830 MG: 500 INJECTION, POWDER, LYOPHILIZED, FOR SOLUTION INTRAMUSCULAR; INTRAVENOUS at 11:05

## 2023-05-30 NOTE — PLAN OF CARE
1325 Patient tolerated injectafer and FOLFOX with no s/s of reaction seen and no complaints. 5FU pump attached and secured. All clamps unclamped and pump infusing without difficulty. Patient understands to call the number on the pump if it malfunctions and to check it during the day to make sure it is infusing. He declined the AVS and understands he is to return on 6/1/23 for pump d/c.

## 2023-05-30 NOTE — PLAN OF CARE
0986 Patient here for injectafer #2 and FOLFOX. Labs, meds and hx reviewed. Patient was seen by MD this am and orders signed for treatment today. Port accessed and NS started at 25ml/hr. Snack and blanket offered.

## 2023-05-30 NOTE — PROGRESS NOTES
Subjective     Patient ID: Poli Young is a 67 y.o. male.    Chief Complaint: No chief complaint on file.  Diagnosis: Small bowel adenocarcinoma .pT3 pN2cM0  Therapy:status post small bowel resection on 4/4/23                Adjuvant mFOLFOX 5/16/23-   HPI  67M underwent work up for anemia and was found to have a small bowel mass about 100cm from IC valve by enteroscopy by Dr Cruz. Got one blood transfusion. He reports history of left central intermittent abdominal pain for several months. No NV. Was found to be anemic on bloodwork. EGD in June 2022 did not show any obvious cause of bleeding.  Capsule endoscopy in January showed blood in small bowel. Enteroscopy showed mass in March that was biopsied and tattooed. Biopsy positive for adenocarcinoma. He is status post small bowel resection on 4/4/23. Pathology positive for  Invasive adenocarcinoma with partial mucinous component (40% of tumor) Lymphovascular invasion is present 17 lymph nodes, 3 positive for metastatic carcinoma (3/17) .Resection margins free of tumor  Pathologic stage pT3 pN Category:  pN2 . Today , he is doing well. Denies dizziness, weakness. No CP, SOB.Appetite improving postop.CT a/p w/contrast 4/13/23 shows Postoperative changes from partial small-bowel resection for a small bowel tumor . No evidence of distant metastases.      Interval Hx: Accompanied by his wife.   S/p C1 completed 5/16/23  No SOB/CP  No N/V  No tingling/numbness in extremities      Sochx: Retired . Never smoked. Drinks etoh occasionally.   .       Past Medical History:   Diagnosis Date    Hyperlipemia     Hypertension        Past Surgical History:   Procedure Laterality Date    DIAGNOSTIC LAPAROSCOPY N/A 4/4/2023    Procedure: LAPAROSCOPY, DIAGNOSTIC;  Surgeon: Tyrone Martínez MD;  Location: [facility] OR;  Service: General;  Laterality: N/A;    ESOPHAGOGASTRODUODENOSCOPY N/A 6/8/2022    Procedure: EGD (ESOPHAGOGASTRODUODENOSCOPY);  Surgeon: Aamir HAMILTON  MD Сергей;  Location: [facility] ENDO;  Service: Endoscopy;  Laterality: N/A;    EXCISION, SMALL INTESTINE N/A 4/4/2023    Procedure: EXCISION, SMALL INTESTINE/SMALL BOWEL RESECTION;  Surgeon: Tyrone Martínez MD;  Location: [facility] OR;  Service: General;  Laterality: N/A;    INSERTION OF VENOUS ACCESS PORT Right 5/2/2023    Procedure: INSERTION, VENOUS ACCESS PORT;  Surgeon: Tyrone Martínez MD;  Location: [facility] OR;  Service: General;  Laterality: Right;    INTRALUMINAL GASTROINTESTINAL TRACT IMAGING VIA CAPSULE N/A 1/20/2023    Procedure: IMAGING PROCEDURE, GI TRACT, INTRALUMINAL, VIA CAPSULE;  Surgeon: Aamir Rushing MD;  Location: [facility] ENDO;  Service: Endoscopy;  Laterality: N/A;    LAPAROTOMY, EXPLORATORY N/A 4/4/2023    Procedure: LAPAROTOMY, EXPLORATORY;  Surgeon: Tyrone Martínez MD;  Location: [facility] OR;  Service: General;  Laterality: N/A;    SMALL BOWEL ENTEROSCOPY N/A 3/20/2023    Procedure: ENTEROSCOPY Upper DBE;  Surgeon: Giancarlo Cruz MD;  Location: [facility] ENDO;  Service: Endoscopy;  Laterality: N/A;        Social History     Tobacco Use    Smoking status: Never    Smokeless tobacco: Never   Substance Use Topics    Alcohol use: Yes     Comment: socially    Drug use: Never        Review of Systems   Constitutional:  Negative for appetite change, fatigue, fever and unexpected weight change.   HENT:  Negative for mouth sores.    Eyes:  Negative for visual disturbance.   Respiratory:  Negative for cough and shortness of breath.    Cardiovascular:  Negative for chest pain.   Gastrointestinal:  Negative for abdominal pain and diarrhea.   Genitourinary:  Negative for frequency.   Musculoskeletal:  Negative for back pain.   Integumentary:  Negative for rash.   Neurological:  Negative for headaches.   Hematological:  Negative for adenopathy.   Psychiatric/Behavioral:  The patient is not nervous/anxious.         Objective       Vitals:    05/30/23 0837   BP: (!) 147/81   BP Location: Left arm   Patient  Position: Sitting   BP Method: Large (Automatic)   Pulse: 81   SpO2: 97%   Weight: 86.9 kg (191 lb 9.3 oz)   Height: 6' (1.829 m)         Physical Exam  Constitutional:       Appearance: He is well-developed.   HENT:      Head: Normocephalic.      Mouth/Throat:      Pharynx: No oropharyngeal exudate.   Eyes:      General: No scleral icterus.        Right eye: No discharge.         Left eye: No discharge.      Conjunctiva/sclera: Conjunctivae normal.   Neck:      Thyroid: No thyromegaly.   Cardiovascular:      Rate and Rhythm: Normal rate and regular rhythm.      Heart sounds: Normal heart sounds. No murmur heard.  Pulmonary:      Effort: Pulmonary effort is normal.      Breath sounds: Normal breath sounds. No wheezing or rales.   Abdominal:      General: Bowel sounds are normal.      Palpations: Abdomen is soft.      Tenderness: There is no abdominal tenderness. There is no guarding or rebound.   Musculoskeletal:         General: No swelling. Normal range of motion.      Cervical back: Normal range of motion and neck supple.   Lymphadenopathy:      Cervical: No cervical adenopathy.      Upper Body:      Right upper body: No supraclavicular adenopathy.      Left upper body: No supraclavicular adenopathy.   Skin:     Findings: No erythema or rash.   Neurological:      Mental Status: He is alert and oriented to person, place, and time.      Cranial Nerves: No cranial nerve deficit.   Psychiatric:         Mood and Affect: Mood normal.         Behavior: Behavior normal.            Pathology 4/4/23  Small intestine, segmental resection:   - Invasive adenocarcinoma with partial mucinous component (40% of tumor)       - Tumor invades through muscularis propria into subserosa   - Lymphovascular invasion is present   - 17 lymph nodes, 3 positive for metastatic carcinoma (3/17)   - Resection margins free of tumor   - Background small intestinal mucosa without significant histopathologic alteration   - See CAP Tumor synoptic  and comment     CAP Tumor Synoptic:   Procedure:  Segmental resection   Tumor site:  Small intestine, not otherwise specified   Histologic type:  Adenocarcinoma (not otherwise characterized), with mucinous component comprising approximately 40% of tumor   Histologic grade:  G2, moderately differentiated   Tumor size:  5.5 cm in greatest dimension   Tumor extent:  Invades through muscularis propria into subserosa without serosal penetration   Lymphovascular invasion:  Present   Macroscopic tumor perforation:  Not identified   Margin status for invasive carcinoma:  All margins negative for invasive carcinoma   Margin status for dysplasia:  All margins negative for carcinoma in situ (high-grade dysplasia)/ adenoma   Regional lymph node status:  Tumor present in regional lymph nodes   Number of lymph nodes with tumor:  3   Number of lymph nodes examined:  17   PATHOLOGIC STAGE CLASSIFICATION   TNM descriptors:  Not applicable   pT Category:  pT3   pN Category:  pN2   Special studies:  Intact expression of DNA mismatch repair proteins (MLH1, MSH2, MSH6, PMS2) in tumor by immunohistochemistry (performed on prior biopsy KES-, collected 3/20/2023); interpreted by Dr. Garcias)   Comment:  Records from this patient's reported recent colonoscopy are not available for review.       CT a/p w/contrast 4/13/23  Impression:     Postoperative changes from partial small-bowel resection for a small bowel tumor.  Pathology results are pending.     Skin thickening and subcutaneous edema near the umbilicus, which could be inflammatory or infectious.  No organized fluid collections along the surgical incision.     Dilated small bowel loops with gradual tapering distally, likely ileus in the early postoperative setting.     Small volume ascites and trace pneumoperitoneum, likely postoperative.  Anastomotic leak is less likely.  No organized intraperitoneal collections.     Other findings as described.        CT chest w/out contrast   5/1/23   Impression:     Asymmetric thyroid gland with possible left lobe thyroid nodule measuring up to 2.2 cm.  Further evaluation with thyroid ultrasound examination is recommended.     Mild centrilobular emphysematous changes.     Mucous plugging within the left upper lobe laterally and within the right lower lobe laterally.     Cluster of pulmonary nodules within the upper left upper lobe measuring up to 0.6 cm in size.  These are likely infectious/inflammatory in etiology, however continued surveillance is recommended in this patient with history of malignancy.     Hepatic cysts.             Lab Results   Component Value Date    WBC 3.34 (L) 05/26/2023    HGB 10.3 (L) 05/26/2023    HCT 33.8 (L) 05/26/2023    MCV 84 05/26/2023     05/26/2023             Assessment and Plan     Problem List Items Addressed This Visit          Oncology    Adenocarcinoma of small bowel - Primary  Poli Young is a 67 y.o. male with biopsy proven small bowel adenocarcinoma now status post small bowel resection on 4/4/23.pT3 pN2M0  CT a/p no evidence of distant mets  Adjuvant chemotherapy for 6 mo is recommended for patients with node-positive, completely resected disease. The regimens of choice are CAPOX (capecitabine and oxaliplatin) and FOLFOX (folinic acid, fluorouracil, and oxaliplatin).  Plan mFOLFOX chemotherapy   Proceed with C2   Cbc,Fe studies,cmp  prior to f/u on 6/13/23   Cbc,cmp,prior to f/u with  Dr. Francisco on 6/27 and 7/11                       Thyroid nodule        US thyroid          Pulmonary nodules        Plan future follow up imaging           PATRICK        Plan IV Fe     Cbc,Fe studies,cmp  prior to f/u on 6/13/23   Cbc,cmp,prior to f/u with  Dr. Francisco on 6/27 and 7/11  Add CEA to f/u labs on 7/11  Schedule thyroid US prior to f/u

## 2023-05-30 NOTE — Clinical Note
Cbc,Fe studies,cmp  prior to f/u on 6/13/23  Cbc,cmp,prior to f/u with  Dr. Francisco on 6/27 and 7/11 Add CEA to f/u labs on 7/11 Schedule thyroid US prior to f/u

## 2023-06-01 ENCOUNTER — INFUSION (OUTPATIENT)
Dept: INFUSION THERAPY | Facility: HOSPITAL | Age: 68
End: 2023-06-01
Attending: INTERNAL MEDICINE
Payer: MEDICARE

## 2023-06-01 VITALS
OXYGEN SATURATION: 96 % | DIASTOLIC BLOOD PRESSURE: 69 MMHG | HEART RATE: 84 BPM | RESPIRATION RATE: 18 BRPM | TEMPERATURE: 99 F | SYSTOLIC BLOOD PRESSURE: 132 MMHG

## 2023-06-01 DIAGNOSIS — C17.9 ADENOCARCINOMA OF SMALL BOWEL: Primary | ICD-10-CM

## 2023-06-01 PROCEDURE — 96523 IRRIG DRUG DELIVERY DEVICE: CPT

## 2023-06-01 PROCEDURE — A4216 STERILE WATER/SALINE, 10 ML: HCPCS | Performed by: INTERNAL MEDICINE

## 2023-06-01 PROCEDURE — 25000003 PHARM REV CODE 250: Performed by: INTERNAL MEDICINE

## 2023-06-01 PROCEDURE — 63600175 PHARM REV CODE 636 W HCPCS: Performed by: INTERNAL MEDICINE

## 2023-06-01 RX ORDER — SODIUM CHLORIDE 0.9 % (FLUSH) 0.9 %
10 SYRINGE (ML) INJECTION
Status: DISCONTINUED | OUTPATIENT
Start: 2023-06-01 | End: 2023-06-01 | Stop reason: HOSPADM

## 2023-06-01 RX ORDER — HEPARIN 100 UNIT/ML
500 SYRINGE INTRAVENOUS
Status: DISCONTINUED | OUTPATIENT
Start: 2023-06-01 | End: 2023-06-01 | Stop reason: HOSPADM

## 2023-06-01 RX ADMIN — Medication 10 ML: at 11:06

## 2023-06-01 RX ADMIN — HEPARIN 500 UNITS: 100 SYRINGE at 11:06

## 2023-06-01 NOTE — PLAN OF CARE
Pt arrived to unit for his C2D3 for his pump d/c. Pt voices no new or worsening complaints today. Denies any issues from cold sensitivity. Reports his energy and appetite has been doing well. VSS. Per 5FU pump, fluorouracil completed. Pt's PAC deaccessed and flushed without difficulty. Pt given his next upcoming appointments. Discharged from unit in NAD.

## 2023-06-06 ENCOUNTER — HOSPITAL ENCOUNTER (OUTPATIENT)
Dept: RADIOLOGY | Facility: HOSPITAL | Age: 68
Discharge: HOME | End: 2023-06-06
Attending: INTERNAL MEDICINE
Payer: MEDICARE

## 2023-06-06 DIAGNOSIS — E04.1 THYROID NODULE: ICD-10-CM

## 2023-06-06 PROCEDURE — 76536 US EXAM OF HEAD AND NECK: CPT | Mod: 26,,, | Performed by: RADIOLOGY

## 2023-06-06 PROCEDURE — 76536 US THYROID: ICD-10-PCS | Mod: 26,,, | Performed by: RADIOLOGY

## 2023-06-06 PROCEDURE — 76536 US EXAM OF HEAD AND NECK: CPT | Mod: TC

## 2023-06-13 ENCOUNTER — OFFICE VISIT (OUTPATIENT)
Dept: HEMATOLOGY/ONCOLOGY | Facility: CLINIC | Age: 68
End: 2023-06-13
Payer: MEDICARE

## 2023-06-13 ENCOUNTER — TELEPHONE (OUTPATIENT)
Dept: INTERVENTIONAL RADIOLOGY/VASCULAR | Facility: HOSPITAL | Age: 68
End: 2023-06-13
Payer: MEDICARE

## 2023-06-13 ENCOUNTER — TELEPHONE (OUTPATIENT)
Dept: HEMATOLOGY/ONCOLOGY | Facility: CLINIC | Age: 68
End: 2023-06-13

## 2023-06-13 ENCOUNTER — INFUSION (OUTPATIENT)
Dept: INFUSION THERAPY | Facility: HOSPITAL | Age: 68
End: 2023-06-13
Attending: INTERNAL MEDICINE
Payer: MEDICARE

## 2023-06-13 VITALS
DIASTOLIC BLOOD PRESSURE: 66 MMHG | SYSTOLIC BLOOD PRESSURE: 127 MMHG | HEART RATE: 85 BPM | OXYGEN SATURATION: 98 % | TEMPERATURE: 97 F

## 2023-06-13 VITALS
SYSTOLIC BLOOD PRESSURE: 130 MMHG | HEIGHT: 72 IN | WEIGHT: 186.94 LBS | DIASTOLIC BLOOD PRESSURE: 77 MMHG | BODY MASS INDEX: 25.32 KG/M2 | OXYGEN SATURATION: 97 % | HEART RATE: 84 BPM

## 2023-06-13 DIAGNOSIS — Z79.899 IMMUNODEFICIENCY SECONDARY TO CHEMOTHERAPY: ICD-10-CM

## 2023-06-13 DIAGNOSIS — E04.1 THYROID NODULE: Primary | ICD-10-CM

## 2023-06-13 DIAGNOSIS — T45.1X5A IMMUNODEFICIENCY SECONDARY TO CHEMOTHERAPY: ICD-10-CM

## 2023-06-13 DIAGNOSIS — C17.9 ADENOCARCINOMA OF SMALL BOWEL: Primary | ICD-10-CM

## 2023-06-13 DIAGNOSIS — R91.8 PULMONARY NODULES: ICD-10-CM

## 2023-06-13 DIAGNOSIS — D50.9 IRON DEFICIENCY ANEMIA, UNSPECIFIED IRON DEFICIENCY ANEMIA TYPE: ICD-10-CM

## 2023-06-13 DIAGNOSIS — G62.0 CHEMOTHERAPY-INDUCED NEUROPATHY: ICD-10-CM

## 2023-06-13 DIAGNOSIS — C17.9 ADENOCARCINOMA OF SMALL BOWEL: ICD-10-CM

## 2023-06-13 DIAGNOSIS — E04.1 THYROID NODULE: ICD-10-CM

## 2023-06-13 DIAGNOSIS — D84.821 IMMUNODEFICIENCY SECONDARY TO CHEMOTHERAPY: ICD-10-CM

## 2023-06-13 DIAGNOSIS — T45.1X5A CHEMOTHERAPY-INDUCED NEUROPATHY: ICD-10-CM

## 2023-06-13 DIAGNOSIS — K76.89 HEPATIC CYST: ICD-10-CM

## 2023-06-13 PROCEDURE — 1101F PT FALLS ASSESS-DOCD LE1/YR: CPT | Mod: CPTII,S$GLB,, | Performed by: INTERNAL MEDICINE

## 2023-06-13 PROCEDURE — 4010F PR ACE/ARB THEARPY RXD/TAKEN: ICD-10-PCS | Mod: CPTII,S$GLB,, | Performed by: INTERNAL MEDICINE

## 2023-06-13 PROCEDURE — 96416 CHEMO PROLONG INFUSE W/PUMP: CPT

## 2023-06-13 PROCEDURE — 25000003 PHARM REV CODE 250: Performed by: INTERNAL MEDICINE

## 2023-06-13 PROCEDURE — 96367 TX/PROPH/DG ADDL SEQ IV INF: CPT

## 2023-06-13 PROCEDURE — 3288F FALL RISK ASSESSMENT DOCD: CPT | Mod: CPTII,S$GLB,, | Performed by: INTERNAL MEDICINE

## 2023-06-13 PROCEDURE — 1101F PR PT FALLS ASSESS DOC 0-1 FALLS W/OUT INJ PAST YR: ICD-10-PCS | Mod: CPTII,S$GLB,, | Performed by: INTERNAL MEDICINE

## 2023-06-13 PROCEDURE — 4010F ACE/ARB THERAPY RXD/TAKEN: CPT | Mod: CPTII,S$GLB,, | Performed by: INTERNAL MEDICINE

## 2023-06-13 PROCEDURE — 96366 THER/PROPH/DIAG IV INF ADDON: CPT

## 2023-06-13 PROCEDURE — 3078F DIAST BP <80 MM HG: CPT | Mod: CPTII,S$GLB,, | Performed by: INTERNAL MEDICINE

## 2023-06-13 PROCEDURE — 96415 CHEMO IV INFUSION ADDL HR: CPT

## 2023-06-13 PROCEDURE — 3008F BODY MASS INDEX DOCD: CPT | Mod: CPTII,S$GLB,, | Performed by: INTERNAL MEDICINE

## 2023-06-13 PROCEDURE — 99215 OFFICE O/P EST HI 40 MIN: CPT | Mod: S$GLB,,, | Performed by: INTERNAL MEDICINE

## 2023-06-13 PROCEDURE — 63600175 PHARM REV CODE 636 W HCPCS: Performed by: INTERNAL MEDICINE

## 2023-06-13 PROCEDURE — 1125F AMNT PAIN NOTED PAIN PRSNT: CPT | Mod: CPTII,S$GLB,, | Performed by: INTERNAL MEDICINE

## 2023-06-13 PROCEDURE — 3075F SYST BP GE 130 - 139MM HG: CPT | Mod: CPTII,S$GLB,, | Performed by: INTERNAL MEDICINE

## 2023-06-13 PROCEDURE — 99999 PR PBB SHADOW E&M-EST. PATIENT-LVL V: CPT | Mod: PBBFAC,,, | Performed by: INTERNAL MEDICINE

## 2023-06-13 PROCEDURE — 3075F PR MOST RECENT SYSTOLIC BLOOD PRESS GE 130-139MM HG: ICD-10-PCS | Mod: CPTII,S$GLB,, | Performed by: INTERNAL MEDICINE

## 2023-06-13 PROCEDURE — 3078F PR MOST RECENT DIASTOLIC BLOOD PRESSURE < 80 MM HG: ICD-10-PCS | Mod: CPTII,S$GLB,, | Performed by: INTERNAL MEDICINE

## 2023-06-13 PROCEDURE — 99999 PR PBB SHADOW E&M-EST. PATIENT-LVL V: ICD-10-PCS | Mod: PBBFAC,,, | Performed by: INTERNAL MEDICINE

## 2023-06-13 PROCEDURE — 1159F PR MEDICATION LIST DOCUMENTED IN MEDICAL RECORD: ICD-10-PCS | Mod: CPTII,S$GLB,, | Performed by: INTERNAL MEDICINE

## 2023-06-13 PROCEDURE — 1159F MED LIST DOCD IN RCRD: CPT | Mod: CPTII,S$GLB,, | Performed by: INTERNAL MEDICINE

## 2023-06-13 PROCEDURE — 96411 CHEMO IV PUSH ADDL DRUG: CPT

## 2023-06-13 PROCEDURE — 3008F PR BODY MASS INDEX (BMI) DOCUMENTED: ICD-10-PCS | Mod: CPTII,S$GLB,, | Performed by: INTERNAL MEDICINE

## 2023-06-13 PROCEDURE — 96368 THER/DIAG CONCURRENT INF: CPT

## 2023-06-13 PROCEDURE — 96413 CHEMO IV INFUSION 1 HR: CPT

## 2023-06-13 PROCEDURE — 3288F PR FALLS RISK ASSESSMENT DOCUMENTED: ICD-10-PCS | Mod: CPTII,S$GLB,, | Performed by: INTERNAL MEDICINE

## 2023-06-13 PROCEDURE — 99215 PR OFFICE/OUTPT VISIT, EST, LEVL V, 40-54 MIN: ICD-10-PCS | Mod: S$GLB,,, | Performed by: INTERNAL MEDICINE

## 2023-06-13 PROCEDURE — 1125F PR PAIN SEVERITY QUANTIFIED, PAIN PRESENT: ICD-10-PCS | Mod: CPTII,S$GLB,, | Performed by: INTERNAL MEDICINE

## 2023-06-13 RX ORDER — HEPARIN 100 UNIT/ML
500 SYRINGE INTRAVENOUS
Status: CANCELLED | OUTPATIENT
Start: 2023-06-13

## 2023-06-13 RX ORDER — SODIUM CHLORIDE 0.9 % (FLUSH) 0.9 %
10 SYRINGE (ML) INJECTION
Status: DISCONTINUED | OUTPATIENT
Start: 2023-06-13 | End: 2023-06-13 | Stop reason: HOSPADM

## 2023-06-13 RX ORDER — FLUOROURACIL 50 MG/ML
400 INJECTION, SOLUTION INTRAVENOUS
Status: CANCELLED | OUTPATIENT
Start: 2023-06-13

## 2023-06-13 RX ORDER — FLUOROURACIL 50 MG/ML
2400 INJECTION, SOLUTION INTRAVENOUS
Status: CANCELLED | OUTPATIENT
Start: 2023-06-13

## 2023-06-13 RX ORDER — HEPARIN 100 UNIT/ML
500 SYRINGE INTRAVENOUS
Status: DISCONTINUED | OUTPATIENT
Start: 2023-06-13 | End: 2023-06-13 | Stop reason: HOSPADM

## 2023-06-13 RX ORDER — HEPARIN 100 UNIT/ML
500 SYRINGE INTRAVENOUS
Status: CANCELLED | OUTPATIENT
Start: 2023-06-15

## 2023-06-13 RX ORDER — EPINEPHRINE 0.3 MG/.3ML
0.3 INJECTION SUBCUTANEOUS ONCE AS NEEDED
Status: CANCELLED | OUTPATIENT
Start: 2023-06-13

## 2023-06-13 RX ORDER — DIPHENHYDRAMINE HYDROCHLORIDE 50 MG/ML
50 INJECTION INTRAMUSCULAR; INTRAVENOUS ONCE AS NEEDED
Status: CANCELLED | OUTPATIENT
Start: 2023-06-13

## 2023-06-13 RX ORDER — FLUOROURACIL 50 MG/ML
400 INJECTION, SOLUTION INTRAVENOUS
Status: COMPLETED | OUTPATIENT
Start: 2023-06-13 | End: 2023-06-13

## 2023-06-13 RX ORDER — SODIUM CHLORIDE 0.9 % (FLUSH) 0.9 %
10 SYRINGE (ML) INJECTION
Status: CANCELLED | OUTPATIENT
Start: 2023-06-15

## 2023-06-13 RX ORDER — SODIUM CHLORIDE 0.9 % (FLUSH) 0.9 %
10 SYRINGE (ML) INJECTION
Status: CANCELLED | OUTPATIENT
Start: 2023-06-13

## 2023-06-13 RX ADMIN — OXALIPLATIN 177 MG: 100 INJECTION, SOLUTION, CONCENTRATE INTRAVENOUS at 10:06

## 2023-06-13 RX ADMIN — LEUCOVORIN CALCIUM 830 MG: 500 INJECTION, POWDER, LYOPHILIZED, FOR SOLUTION INTRAMUSCULAR; INTRAVENOUS at 10:06

## 2023-06-13 RX ADMIN — FLUOROURACIL 830 MG: 50 INJECTION, SOLUTION INTRAVENOUS at 12:06

## 2023-06-13 RX ADMIN — FLUOROURACIL 4990 MG: 50 INJECTION, SOLUTION INTRAVENOUS at 12:06

## 2023-06-13 RX ADMIN — DEXAMETHASONE SODIUM PHOSPHATE 0.25 MG: 10 INJECTION, SOLUTION INTRAMUSCULAR; INTRAVENOUS at 09:06

## 2023-06-13 NOTE — PROGRESS NOTES
Subjective     Patient ID: Poli Young is a 67 y.o. male.    Chief Complaint: Chemotherapy  Diagnosis: Small bowel adenocarcinoma .pT3 pN2cM0  Therapy:status post small bowel resection on 4/4/23                Adjuvant mFOLFOX 5/16/23-   HPI  67M underwent work up for anemia and was found to have a small bowel mass about 100cm from IC valve by enteroscopy by Dr Cruz. Got one blood transfusion. He reports history of left central intermittent abdominal pain for several months. No NV. Was found to be anemic on bloodwork. EGD in June 2022 did not show any obvious cause of bleeding.  Capsule endoscopy in January showed blood in small bowel. Enteroscopy showed mass in March that was biopsied and tattooed. Biopsy positive for adenocarcinoma. He is status post small bowel resection on 4/4/23. Pathology positive for  Invasive adenocarcinoma with partial mucinous component (40% of tumor) Lymphovascular invasion is present 17 lymph nodes, 3 positive for metastatic carcinoma (3/17) .Resection margins free of tumor  Pathologic stage pT3 pN Category:  pN2 . Today , he is doing well. Denies dizziness, weakness. No CP, SOB.Appetite improving postop.CT a/p w/contrast 4/13/23 shows Postoperative changes from partial small-bowel resection for a small bowel tumor . No evidence of distant metastases.      Interval Hx: Doing well  S/p C2 completed 6/1/23  Appetite and weight stable  No SOB/CP  No N/V  Minor intermittent tingling/numbness in extremities      Sochx: Retired . Never smoked. Drinks etoh occasionally.   .       Past Medical History:   Diagnosis Date    Hyperlipemia     Hypertension        Past Surgical History:   Procedure Laterality Date    DIAGNOSTIC LAPAROSCOPY N/A 4/4/2023    Procedure: LAPAROSCOPY, DIAGNOSTIC;  Surgeon: Tyrone Martínez MD;  Location: [facility];  Service: General;  Laterality: N/A;    ESOPHAGOGASTRODUODENOSCOPY N/A 6/8/2022    Procedure: EGD (ESOPHAGOGASTRODUODENOSCOPY);  Surgeon:  Aamir Rushing MD;  Location: [facility] ENDO;  Service: Endoscopy;  Laterality: N/A;    EXCISION, SMALL INTESTINE N/A 4/4/2023    Procedure: EXCISION, SMALL INTESTINE/SMALL BOWEL RESECTION;  Surgeon: Tyrone Martínez MD;  Location: [facility] OR;  Service: General;  Laterality: N/A;    INSERTION OF VENOUS ACCESS PORT Right 5/2/2023    Procedure: INSERTION, VENOUS ACCESS PORT;  Surgeon: Tyrone Martínez MD;  Location: [facility] OR;  Service: General;  Laterality: Right;    INTRALUMINAL GASTROINTESTINAL TRACT IMAGING VIA CAPSULE N/A 1/20/2023    Procedure: IMAGING PROCEDURE, GI TRACT, INTRALUMINAL, VIA CAPSULE;  Surgeon: Aamir Rushing MD;  Location: [facility] ENDO;  Service: Endoscopy;  Laterality: N/A;    LAPAROTOMY, EXPLORATORY N/A 4/4/2023    Procedure: LAPAROTOMY, EXPLORATORY;  Surgeon: Tyrone Martínez MD;  Location: [facility] OR;  Service: General;  Laterality: N/A;    SMALL BOWEL ENTEROSCOPY N/A 3/20/2023    Procedure: ENTEROSCOPY Upper DBE;  Surgeon: Giancarlo Cruz MD;  Location: [facility] ENDO;  Service: Endoscopy;  Laterality: N/A;        Social History     Tobacco Use    Smoking status: Never    Smokeless tobacco: Never   Substance Use Topics    Alcohol use: Yes     Comment: socially    Drug use: Never        Review of Systems   Constitutional:  Negative for appetite change, fatigue, fever and unexpected weight change.   HENT:  Negative for mouth sores.    Eyes:  Negative for visual disturbance.   Respiratory:  Negative for cough and shortness of breath.    Cardiovascular:  Negative for chest pain.   Gastrointestinal:  Negative for abdominal pain and diarrhea.   Genitourinary:  Negative for frequency.   Musculoskeletal:  Negative for back pain.   Integumentary:  Negative for rash.   Neurological:  Positive for numbness. Negative for headaches.   Hematological:  Negative for adenopathy.   Psychiatric/Behavioral:  The patient is not nervous/anxious.         Objective       Vitals:    06/13/23 0838   BP: 130/77   Pulse: 84    SpO2: 97%   Weight: 84.8 kg (186 lb 15.2 oz)   Height: 6' (1.829 m)         Physical Exam  Constitutional:       Appearance: He is well-developed.   HENT:      Head: Normocephalic.      Mouth/Throat:      Pharynx: No oropharyngeal exudate.   Eyes:      General: No scleral icterus.        Right eye: No discharge.         Left eye: No discharge.      Conjunctiva/sclera: Conjunctivae normal.   Neck:      Thyroid: No thyromegaly.   Cardiovascular:      Rate and Rhythm: Normal rate and regular rhythm.      Heart sounds: Normal heart sounds. No murmur heard.  Pulmonary:      Effort: Pulmonary effort is normal.      Breath sounds: Normal breath sounds. No wheezing or rales.   Abdominal:      General: Bowel sounds are normal.      Palpations: Abdomen is soft.      Tenderness: There is no abdominal tenderness. There is no guarding or rebound.   Musculoskeletal:         General: No swelling. Normal range of motion.      Cervical back: Normal range of motion and neck supple.   Lymphadenopathy:      Cervical: No cervical adenopathy.      Upper Body:      Right upper body: No supraclavicular adenopathy.      Left upper body: No supraclavicular adenopathy.   Skin:     Findings: No erythema or rash.   Neurological:      Mental Status: He is alert and oriented to person, place, and time.      Cranial Nerves: No cranial nerve deficit.   Psychiatric:         Mood and Affect: Mood normal.         Behavior: Behavior normal.            Pathology 4/4/23  Small intestine, segmental resection:   - Invasive adenocarcinoma with partial mucinous component (40% of tumor)       - Tumor invades through muscularis propria into subserosa   - Lymphovascular invasion is present   - 17 lymph nodes, 3 positive for metastatic carcinoma (3/17)   - Resection margins free of tumor   - Background small intestinal mucosa without significant histopathologic alteration   - See CAP Tumor synoptic and comment     CAP Tumor Synoptic:   Procedure:  Segmental  resection   Tumor site:  Small intestine, not otherwise specified   Histologic type:  Adenocarcinoma (not otherwise characterized), with mucinous component comprising approximately 40% of tumor   Histologic grade:  G2, moderately differentiated   Tumor size:  5.5 cm in greatest dimension   Tumor extent:  Invades through muscularis propria into subserosa without serosal penetration   Lymphovascular invasion:  Present   Macroscopic tumor perforation:  Not identified   Margin status for invasive carcinoma:  All margins negative for invasive carcinoma   Margin status for dysplasia:  All margins negative for carcinoma in situ (high-grade dysplasia)/ adenoma   Regional lymph node status:  Tumor present in regional lymph nodes   Number of lymph nodes with tumor:  3   Number of lymph nodes examined:  17   PATHOLOGIC STAGE CLASSIFICATION   TNM descriptors:  Not applicable   pT Category:  pT3   pN Category:  pN2   Special studies:  Intact expression of DNA mismatch repair proteins (MLH1, MSH2, MSH6, PMS2) in tumor by immunohistochemistry (performed on prior biopsy S-, collected 3/20/2023); interpreted by Dr. Garcias)   Comment:  Records from this patient's reported recent colonoscopy are not available for review.       CT a/p w/contrast 4/13/23  Impression:     Postoperative changes from partial small-bowel resection for a small bowel tumor.  Pathology results are pending.     Skin thickening and subcutaneous edema near the umbilicus, which could be inflammatory or infectious.  No organized fluid collections along the surgical incision.     Dilated small bowel loops with gradual tapering distally, likely ileus in the early postoperative setting.     Small volume ascites and trace pneumoperitoneum, likely postoperative.  Anastomotic leak is less likely.  No organized intraperitoneal collections.     Other findings as described.        CT chest w/out contrast  5/1/23   Impression:     Asymmetric thyroid gland with  possible left lobe thyroid nodule measuring up to 2.2 cm.  Further evaluation with thyroid ultrasound examination is recommended.     Mild centrilobular emphysematous changes.     Mucous plugging within the left upper lobe laterally and within the right lower lobe laterally.     Cluster of pulmonary nodules within the upper left upper lobe measuring up to 0.6 cm in size.  These are likely infectious/inflammatory in etiology, however continued surveillance is recommended in this patient with history of malignancy.     Hepatic cysts.       EXAMINATION:  6/6/23   US THYROID     CLINICAL HISTORY:  Nontoxic single thyroid nodule     TECHNIQUE:  Ultrasound of the thyroid.     COMPARISON:  None.     FINDINGS:  Right thyroid lobe measures 4.7 x 1.4 x 1.7 cm with homogeneous parenchyma.     Left thyroid lobe measures 4.4 x 2.3 x 1.9 cm.  Heterogeneous mostly solid, part cystic nodule measuring up to 2.7 cm.     Isthmus measures 0.3 cm.     No evidence for cervical adenopathy.     Impression:     Single measured nodule in the left lobe which meets criteria for FNA guided sampling.        Electronically signed by: Nikita Parish      Lab Results   Component Value Date    WBC 3.96 06/12/2023    HGB 11.6 (L) 06/12/2023    HCT 37.1 (L) 06/12/2023    MCV 86 06/12/2023     06/12/2023             Assessment and Plan     Problem List Items Addressed This Visit          Oncology    Adenocarcinoma of small bowel - Primary  Poli Young is a 67 y.o. male with biopsy proven small bowel adenocarcinoma now status post small bowel resection on 4/4/23.pT3 pN2M0  CT a/p no evidence of distant mets  Adjuvant chemotherapy for 6 mo is recommended for patients with node-positive, completely resected disease. The regimens of choice are CAPOX (capecitabine and oxaliplatin) and FOLFOX (folinic acid, fluorouracil, and oxaliplatin).  Plan mFOLFOX chemotherapy   Proceed with C2   Cbc,Fe studies,cmp  prior to f/u on 6/13/23   Cbc,cmp,prior  to f/u with  Dr. Francisco on 6/27 and 7/11                       Thyroid nodule        US thyroid shows Single measured nodule in the left lobe which meets criteria for FNA guided sampling.        PLAN FNA        Ambulatory Referral to Endocrinology           Pulmonary nodules        CT chest 5/1/23 - pulm nodules, likely infectious        Plan follow up CT  imaging           PATRICK         S/p IV Fe        Improved         Hepatic cysts       Plan follow up CT imaging            Chemotherapy induced neuropathy       [facility] neuropathy cream at [facility]       Schedule CT FIRST WEEK OF JULY  Cbc,cmp,prior to f/u with  Dr. Francisco on 6/27 and 7/11  Add CEA to f/u labs on 7/11  Send rx for neuropathy cream    I spent a total of  40  minutes on the day of the visit.This includes face to face time and non-face to face time preparing to see the patient (eg, review of tests), obtaining and/or reviewing separately obtained history, documenting clinical information in the electronic or other health record, independently interpreting results and communicating results to the patient/family/caregiver, and coordinating care.

## 2023-06-13 NOTE — TELEPHONE ENCOUNTER
Attempted to call patient to schedule IR procedure. Unable to reach patient, a voicemail was left with our contact information ([phone]).  \

## 2023-06-13 NOTE — TELEPHONE ENCOUNTER
TC to pt to discuss appointment for FNA on Thyroid  Message left on recorder for him to contact office    TC to daughter Marika requesting she have patient contact office She agreed to contact him and have him reach out to office    Tc to pt to discuss FNA of Thyroid nodule message left on VM for him to contact office

## 2023-06-13 NOTE — PLAN OF CARE
Pt arrived to unit for C3D1 of FOLFOX. Completed labs and a follow-up in clinic with  prior to. Pt okay to proceed with tx today. VSS. Reports some increasing numbness and tingling to his hands and feet. Also reports the back of his feet have started to peel. Plan of care reviewed. Pre-med of Aloxi/Dex given. Oxaliplatin infused over 2 hours with Leucovorin infusing concurrently. Tolerated well. Pt given his 5FU bolus over 5 minutes. 5FU pump connected at 1255. Pt and spouse instructed to return on Thursday, 6/15, at 9:45AM for his pump d/c before having his biopsy done. Verbalized understanding. Pt discharged from unit in NAD.

## 2023-06-13 NOTE — Clinical Note
Schedule CT FIRST WEEK OF JULY  Cbc,cmp,prior to f/u with  Dr. Francisco on 6/27 and 7/11 Add CEA to f/u labs on 7/11 Send rx for neuropathy cream

## 2023-06-14 ENCOUNTER — TELEPHONE (OUTPATIENT)
Dept: HEMATOLOGY/ONCOLOGY | Facility: CLINIC | Age: 68
End: 2023-06-14
Payer: MEDICARE

## 2023-06-14 NOTE — TELEPHONE ENCOUNTER
Krunal Purvis, nurse at DR Villatoro's office Endocrinology  Good morning Krunal  This is Margaret Vargas nurse from Oncology The patient that I spoke w/ you about yesterday is scheduled for a FNA of his Thyroid nodule tomorrow 6-15 in IR  If you could schedule him post biopsy Dr Michaels would like Dr Villatoro,s opinion on this gentleman  His MRN is [id number] Poli Young. He has a diagnosis of Thyroid nodule ,adenocarcinoma of colon and pulmonary nodules. Please advise me if you need any additional information   If you can let me know what date you will see him so I can make sure we get him back with us once Dr Michaels has reviewed Dr Villatoro's recommendations . Thank you for your assistance in this matter Margaret [phone]

## 2023-06-15 ENCOUNTER — INFUSION (OUTPATIENT)
Dept: INFUSION THERAPY | Facility: HOSPITAL | Age: 68
End: 2023-06-15
Attending: INTERNAL MEDICINE
Payer: MEDICARE

## 2023-06-15 ENCOUNTER — HOSPITAL ENCOUNTER (OUTPATIENT)
Dept: INTERVENTIONAL RADIOLOGY/VASCULAR | Facility: HOSPITAL | Age: 68
Discharge: HOME | End: 2023-06-15
Attending: INTERNAL MEDICINE
Payer: MEDICARE

## 2023-06-15 VITALS
HEART RATE: 76 BPM | RESPIRATION RATE: 18 BRPM | DIASTOLIC BLOOD PRESSURE: 68 MMHG | SYSTOLIC BLOOD PRESSURE: 129 MMHG | TEMPERATURE: 98 F | OXYGEN SATURATION: 97 %

## 2023-06-15 VITALS
OXYGEN SATURATION: 96 % | RESPIRATION RATE: 16 BRPM | SYSTOLIC BLOOD PRESSURE: 130 MMHG | DIASTOLIC BLOOD PRESSURE: 68 MMHG | HEART RATE: 70 BPM

## 2023-06-15 DIAGNOSIS — C17.9 ADENOCARCINOMA OF SMALL BOWEL: Primary | ICD-10-CM

## 2023-06-15 DIAGNOSIS — E04.1 THYROID NODULE: Primary | ICD-10-CM

## 2023-06-15 PROCEDURE — 88172 CYTP DX EVAL FNA 1ST EA SITE: CPT | Mod: 26,,, | Performed by: PATHOLOGY

## 2023-06-15 PROCEDURE — 88173 CYTOPATH EVAL FNA REPORT: CPT | Mod: 26,,, | Performed by: PATHOLOGY

## 2023-06-15 PROCEDURE — A4216 STERILE WATER/SALINE, 10 ML: HCPCS | Performed by: INTERNAL MEDICINE

## 2023-06-15 PROCEDURE — 88173 PR  INTERPRETATION OF FNA SMEAR: ICD-10-PCS | Mod: 26,,, | Performed by: PATHOLOGY

## 2023-06-15 PROCEDURE — 10005 IR US FINE NEEDLE ASPIRATION BIOPSY, FIRST LESION: ICD-10-PCS | Mod: LT,,, | Performed by: RADIOLOGY

## 2023-06-15 PROCEDURE — 88177 PR  EVALUATION OF FNA SMEAR TO DETERMINE ADEQUACY, EA ADD EVAL: ICD-10-PCS | Mod: 26,,, | Performed by: PATHOLOGY

## 2023-06-15 PROCEDURE — 25000003 PHARM REV CODE 250: Performed by: RADIOLOGY

## 2023-06-15 PROCEDURE — 27200940 IR US FINE NEEDLE ASPIRATION BIOPSY, FIRST LESION

## 2023-06-15 PROCEDURE — 25000003 PHARM REV CODE 250: Performed by: INTERNAL MEDICINE

## 2023-06-15 PROCEDURE — 88172 CYTP DX EVAL FNA 1ST EA SITE: CPT | Performed by: PATHOLOGY

## 2023-06-15 PROCEDURE — 88177 CYTP FNA EVAL EA ADDL: CPT | Performed by: PATHOLOGY

## 2023-06-15 PROCEDURE — 88173 CYTOPATH EVAL FNA REPORT: CPT | Performed by: PATHOLOGY

## 2023-06-15 PROCEDURE — 10005 FNA BX W/US GDN 1ST LES: CPT | Performed by: RADIOLOGY

## 2023-06-15 PROCEDURE — 88172 PR  EVALUATION OF FNA SMEAR TO DETERMINE ADEQUACY, FIRST EVAL: ICD-10-PCS | Mod: 26,,, | Performed by: PATHOLOGY

## 2023-06-15 PROCEDURE — 88177 CYTP FNA EVAL EA ADDL: CPT | Mod: 26,,, | Performed by: PATHOLOGY

## 2023-06-15 PROCEDURE — 63600175 PHARM REV CODE 636 W HCPCS: Performed by: INTERNAL MEDICINE

## 2023-06-15 RX ORDER — SODIUM CHLORIDE 0.9 % (FLUSH) 0.9 %
10 SYRINGE (ML) INJECTION
Status: DISCONTINUED | OUTPATIENT
Start: 2023-06-15 | End: 2023-06-15 | Stop reason: HOSPADM

## 2023-06-15 RX ORDER — HEPARIN 100 UNIT/ML
500 SYRINGE INTRAVENOUS
Status: DISCONTINUED | OUTPATIENT
Start: 2023-06-15 | End: 2023-06-15 | Stop reason: HOSPADM

## 2023-06-15 RX ORDER — LIDOCAINE HYDROCHLORIDE 10 MG/ML
INJECTION INFILTRATION; PERINEURAL
Status: COMPLETED | OUTPATIENT
Start: 2023-06-15 | End: 2023-06-15

## 2023-06-15 RX ADMIN — Medication 10 ML: at 08:06

## 2023-06-15 RX ADMIN — HEPARIN 500 UNITS: 100 SYRINGE at 08:06

## 2023-06-15 RX ADMIN — LIDOCAINE HYDROCHLORIDE 5 ML: 10 INJECTION, SOLUTION INFILTRATION; PERINEURAL at 09:06

## 2023-06-15 NOTE — PLAN OF CARE
Pt arrived to unit for his C3D3 for his pump d/c. Pt voices no new or worsening complaints today. VSS. Per 5FU pump, fluorouracil completed. Pt's PAC deaccessed and flushed without difficulty. Pt given his next upcoming appointments. Discharged from unit in NAD.

## 2023-06-15 NOTE — BRIEF OP NOTE
Radiology Post-Procedure Note    Pre Op Diagnosis: Suspicious LLP thyroid nodule  Post Op Diagnosis: Same    Procedure: 1. US-guided percutaneous 25-gauge FNA of the suspicious LLP thyroid nodule    Procedure performed by: Krunal Yo MD    Written Informed Consent Obtained: Yes  Specimen Removed: YES, 25-G FNA x 1 pass  Estimated Blood Loss: none    Findings:   Successful US-guided percutaneous 25-gauge FNA of the suspicious LLP thyroid nodule with local anesthetic only. Patient tolerated the procedure well. No immediate post-procedural complications noted.     No post-op activity, diet or medication restrictions.    Thank you for considering IR for the care of your patient.     Krunal Yo MD  Interventional Radiology

## 2023-06-15 NOTE — DISCHARGE SUMMARY
Radiology Discharge Summary      Hospital Course: No complications    Admit Date: 6/15/2023  Discharge Date: 06/15/2023     Instructions Given to Patient: Yes    Diet: Resume prior diet    Activity: activity as tolerated    Description of Condition on Discharge: Stable    Vital Signs (Most Recent): Pulse: 70 (06/15/23 0943)  Resp: 16 (06/15/23 0943)  BP: 130/68 (06/15/23 0943)  SpO2: 96 % (06/15/23 0943)    Discharge Disposition: Home    Discharge Diagnosis:   67 y.o. male with pertinent PMHx of thyroid nodule with a 2.2 x 1.9 x 2.7-cm nodule within the lower left thyroid lobe that demonstrates suspicious sonographic imaging features meeting criteria for recommendations for tissue-sampling.     Patient is now s/p successful US-guided percutaneous 25-gauge FNA of the suspicious LLP thyroid nodule with local anesthetic only. Patient tolerated the procedure well. No immediate post-procedural complications noted.     No post-op activity, diet or medication restrictions.    Thank you for considering IR for the care of your patient.     Krunal Yo MD  Interventional Radiology

## 2023-06-15 NOTE — H&P
Radiology History & Physical      SUBJECTIVE:     Chief Complaint: Suspicious LLP thyroid nodule    History of Present Illness:  Poli Young is a 67 y.o. male with pertinent PMHx of thyroid nodule with a 2.2 x 1.9 x 2.7-cm nodule within the lower left thyroid lobe that demonstrates suspicious sonographic imaging features meeting criteria for recommendations for tissue-sampling.     New outpatient IR consult received for US-guided percutaneous 25-gauge FNA of the suspicious LLP thyroid nodule.    Past Medical History:   Diagnosis Date    Hyperlipemia     Hypertension      Past Surgical History:   Procedure Laterality Date    DIAGNOSTIC LAPAROSCOPY N/A 4/4/2023    Procedure: LAPAROSCOPY, DIAGNOSTIC;  Surgeon: Tyrone Martínez MD;  Location: [facility] OR;  Service: General;  Laterality: N/A;    ESOPHAGOGASTRODUODENOSCOPY N/A 6/8/2022    Procedure: EGD (ESOPHAGOGASTRODUODENOSCOPY);  Surgeon: Aamir Rushing MD;  Location: [facility];  Service: Endoscopy;  Laterality: N/A;    EXCISION, SMALL INTESTINE N/A 4/4/2023    Procedure: EXCISION, SMALL INTESTINE/SMALL BOWEL RESECTION;  Surgeon: Tyrone Martínez MD;  Location: [facility] OR;  Service: General;  Laterality: N/A;    INSERTION OF VENOUS ACCESS PORT Right 5/2/2023    Procedure: INSERTION, VENOUS ACCESS PORT;  Surgeon: Tyrone Martínez MD;  Location: [facility] OR;  Service: General;  Laterality: Right;    INTRALUMINAL GASTROINTESTINAL TRACT IMAGING VIA CAPSULE N/A 1/20/2023    Procedure: IMAGING PROCEDURE, GI TRACT, INTRALUMINAL, VIA CAPSULE;  Surgeon: Aamir Rushing MD;  Location: [facility];  Service: Endoscopy;  Laterality: N/A;    LAPAROTOMY, EXPLORATORY N/A 4/4/2023    Procedure: LAPAROTOMY, EXPLORATORY;  Surgeon: Tyrone Martínez MD;  Location: [facility] OR;  Service: General;  Laterality: N/A;    SMALL BOWEL ENTEROSCOPY N/A 3/20/2023    Procedure: ENTEROSCOPY Upper DBE;  Surgeon: Giancarlo Cruz MD;  Location: [facility] ENDO;  Service: Endoscopy;  Laterality: N/A;      Home Meds:   Prior to Admission medications    Medication Sig Start Date End Date Taking? Authorizing Provider   amLODIPine (NORVASC) 10 MG tablet Take 10 mg by mouth once daily. 5/16/22   Historical Provider   aspirin (ECOTRIN) 81 MG EC tablet Take 81 mg by mouth.    Historical Provider   atorvastatin (LIPITOR) 10 MG tablet Take 10 mg by mouth once daily. 6/24/22   Historical Provider   dexAMETHasone (DECADRON) 4 MG Tab Take 2 tablets (8 mg total) by mouth once daily. Take as directed on days 2 and 3 of your chemotherapy cycle. 5/10/23   Amparo Michaels MD   fluticasone propionate (FLONASE) 50 mcg/actuation nasal spray 1 spray by Each Nostril route once daily. 5/16/22   Historical Provider   HYDROcodone-acetaminophen (NORCO) 5-325 mg per tablet Take 1 tablet by mouth every 4 (four) hours as needed for Pain. 4/11/23   Tyrone Martínez MD   lbihv-rq-5-dha-epa-phospho-ast 1,000-230-60 mg Cap MegaRed Omega-3 Krill Oil 1,000 mg-230 mg-60 mg capsule   Take 1 capsule by oral route.    Historical Provider   lisinopriL (PRINIVIL,ZESTRIL) 2.5 MG tablet Take 2.5 mg by mouth once daily. 6/24/22   Historical Provider   ondansetron (ZOFRAN) 8 MG tablet Take 1 tablet (8 mg total) by mouth every 8 (eight) hours as needed for Nausea. 4/11/23   Tyrone Martínez MD   ondansetron (ZOFRAN-ODT) 8 MG TbDL Take 1 tablet (8 mg total) by mouth every 8 (eight) hours as needed (nausea/vomiting). 5/9/23   Amparo Michaels MD   pantoprazole (PROTONIX) 40 MG tablet Take 1 tablet (40 mg total) by mouth once daily. 7/7/22   Aamir Rushing MD   senna-docusate 8.6-50 mg (PERICOLACE) 8.6-50 mg per tablet Take 1 tablet by mouth 2 (two) times daily. 5/2/23   Tyrone Martínez MD   sildenafiL (VIAGRA) 50 MG tablet Take 50 mg by mouth daily as needed. 5/27/22   Historical Provider     Anticoagulants/Antiplatelets: aspirin    Allergies: Review of patient's allergies indicates:  No Known Allergies    Sedation History:  no adverse  reactions    Review of Systems:   Hematological: no known coagulopathies  Respiratory: no cough, shortness of breath, or wheezing  Cardiovascular: no chest pain or dyspnea on exertion  Gastrointestinal: no abdominal pain, change in bowel habits, or black or bloody stools  Genito-Urinary: no dysuria, trouble voiding, or hematuria  Musculoskeletal: negative  Neurological: no TIA or stroke symptoms       OBJECTIVE:     Vital Signs (Most Recent)       Physical Exam:  General: no acute distress  Mental Status: alert and oriented to person, place and time  HEENT: normocephalic, atraumatic  Chest: unlabored breathing  Heart: regular heart rate  Abdomen: nondistended  Extremity: moves all extremities    Laboratory  Lab Results   Component Value Date    INR 1.0 06/08/2022       Lab Results   Component Value Date    WBC 3.96 06/12/2023    HGB 11.6 (L) 06/12/2023    HCT 37.1 (L) 06/12/2023    MCV 86 06/12/2023     06/12/2023      Lab Results   Component Value Date    GLU 98 06/12/2023     06/12/2023    K 4.1 06/12/2023     06/12/2023    CO2 25 06/12/2023    BUN 13 06/12/2023    CREATININE 1.1 06/12/2023    CALCIUM 9.3 06/12/2023    MG 1.8 05/26/2023    ALT 17 06/12/2023    AST 15 06/12/2023    ALBUMIN 3.6 06/12/2023    BILITOT 0.3 06/12/2023     ASSESSMENT/PLAN:     67 y.o. male with pertinent PMHx of thyroid nodule with a 2.2 x 1.9 x 2.7-cm nodule within the lower left thyroid lobe that demonstrates suspicious sonographic imaging features meeting criteria for recommendations for tissue-sampling.     Suspicious LLP thyroid nodule - Will attempt US-guided percutaneous 25-gauge FNA of the suspicious LLP thyroid nodule with local anesthetic only.    Risks (including, but not limited to, pain, bleeding, infection, damage to nearby structures, failure to obtain sufficient material for a diagnosis, the need for additional procedures, and death), benefits, and alternatives were discussed with the patient. All  questions were answered to the best of my abilities. The patient wishes to proceed with the procedure. Written informed consent was obtained.    Thank you for considering IR for the care of your patient.     Krunal Yo MD  Interventional Radiology

## 2023-06-16 LAB
ADEQUACY: NORMAL
FINAL PATHOLOGIC DIAGNOSIS: NORMAL
Lab: NORMAL

## 2023-06-20 ENCOUNTER — TELEPHONE (OUTPATIENT)
Dept: HEMATOLOGY/ONCOLOGY | Facility: CLINIC | Age: 68
End: 2023-06-20
Payer: MEDICARE

## 2023-06-26 ENCOUNTER — OFFICE VISIT (OUTPATIENT)
Dept: ENDOCRINOLOGY | Facility: CLINIC | Age: 68
End: 2023-06-26
Payer: MEDICARE

## 2023-06-26 VITALS
DIASTOLIC BLOOD PRESSURE: 81 MMHG | TEMPERATURE: 98 F | WEIGHT: 197.38 LBS | SYSTOLIC BLOOD PRESSURE: 136 MMHG | BODY MASS INDEX: 26.77 KG/M2 | HEART RATE: 84 BPM

## 2023-06-26 DIAGNOSIS — E04.1 THYROID NODULE: Primary | ICD-10-CM

## 2023-06-26 PROCEDURE — 99999 PR PBB SHADOW E&M-EST. PATIENT-LVL III: ICD-10-PCS | Mod: PBBFAC,,, | Performed by: HOSPITALIST

## 2023-06-26 PROCEDURE — 3079F DIAST BP 80-89 MM HG: CPT | Mod: CPTII,S$GLB,, | Performed by: HOSPITALIST

## 2023-06-26 PROCEDURE — 1159F PR MEDICATION LIST DOCUMENTED IN MEDICAL RECORD: ICD-10-PCS | Mod: CPTII,S$GLB,, | Performed by: HOSPITALIST

## 2023-06-26 PROCEDURE — 1159F MED LIST DOCD IN RCRD: CPT | Mod: CPTII,S$GLB,, | Performed by: HOSPITALIST

## 2023-06-26 PROCEDURE — 1101F PT FALLS ASSESS-DOCD LE1/YR: CPT | Mod: CPTII,S$GLB,, | Performed by: HOSPITALIST

## 2023-06-26 PROCEDURE — 4010F PR ACE/ARB THEARPY RXD/TAKEN: ICD-10-PCS | Mod: CPTII,S$GLB,, | Performed by: HOSPITALIST

## 2023-06-26 PROCEDURE — 3075F PR MOST RECENT SYSTOLIC BLOOD PRESS GE 130-139MM HG: ICD-10-PCS | Mod: CPTII,S$GLB,, | Performed by: HOSPITALIST

## 2023-06-26 PROCEDURE — 3008F PR BODY MASS INDEX (BMI) DOCUMENTED: ICD-10-PCS | Mod: CPTII,S$GLB,, | Performed by: HOSPITALIST

## 2023-06-26 PROCEDURE — 1101F PR PT FALLS ASSESS DOC 0-1 FALLS W/OUT INJ PAST YR: ICD-10-PCS | Mod: CPTII,S$GLB,, | Performed by: HOSPITALIST

## 2023-06-26 PROCEDURE — 4010F ACE/ARB THERAPY RXD/TAKEN: CPT | Mod: CPTII,S$GLB,, | Performed by: HOSPITALIST

## 2023-06-26 PROCEDURE — 99204 PR OFFICE/OUTPT VISIT, NEW, LEVL IV, 45-59 MIN: ICD-10-PCS | Mod: S$GLB,,, | Performed by: HOSPITALIST

## 2023-06-26 PROCEDURE — 99999 PR PBB SHADOW E&M-EST. PATIENT-LVL III: CPT | Mod: PBBFAC,,, | Performed by: HOSPITALIST

## 2023-06-26 PROCEDURE — 99204 OFFICE O/P NEW MOD 45 MIN: CPT | Mod: S$GLB,,, | Performed by: HOSPITALIST

## 2023-06-26 PROCEDURE — 3075F SYST BP GE 130 - 139MM HG: CPT | Mod: CPTII,S$GLB,, | Performed by: HOSPITALIST

## 2023-06-26 PROCEDURE — 3008F BODY MASS INDEX DOCD: CPT | Mod: CPTII,S$GLB,, | Performed by: HOSPITALIST

## 2023-06-26 PROCEDURE — 3079F PR MOST RECENT DIASTOLIC BLOOD PRESSURE 80-89 MM HG: ICD-10-PCS | Mod: CPTII,S$GLB,, | Performed by: HOSPITALIST

## 2023-06-26 PROCEDURE — 3288F PR FALLS RISK ASSESSMENT DOCUMENTED: ICD-10-PCS | Mod: CPTII,S$GLB,, | Performed by: HOSPITALIST

## 2023-06-26 PROCEDURE — 3288F FALL RISK ASSESSMENT DOCD: CPT | Mod: CPTII,S$GLB,, | Performed by: HOSPITALIST

## 2023-06-26 NOTE — PROGRESS NOTES
Subjective:      Patient ID: Poli Young is a 67 y.o. male presented to [facility] on 6/26/2023.  Chief Complaint:  Thyroid Nodule    History of Present Illness: Poli Young is a 67 y.o. male here for thyroid nodule  Other significant past medical history: Small intestine cancer    1) Thyroid nodule   - Diagnosed by ultrasound in:  6/6/2023:  solid, part cystic nodule measuring up to 2.7 cm.  - Patient CT chest showing thyroid nodule on 05/2023  - Patient underwent FNA of large thyroid nodule 6/15/2023: Benign thyroid nodule  - Family history thyroid disorder: no  - Family history thyroid cancer: no  - Tobacco user: no    Symptoms:  No  Yes  [x]    [] Compressive symptoms  [x]    [] Anterior neck pressure  [x]    [] Dysphagia sometimes  [x]    [] Voice changes - comes and goes     Risk Factors:  No   Yes  [x]    [] Radiation to head or neck for any treatment of cancer or exposure to radiation  [x]    [] Personal history of cancer including:  colon or breast cancer    Symptoms of hyper or hypothyroidism:  Weight changes?: No  Diarrhea or Constipation?: No  Heat or cold intolerance?: No  Hair, nail or skin changes?: No  Chest pain, palpations or shortness of breath?: No   Mental fog?: No    Previous biopsy results:  Last ultrasound:  6/6/2023  Right thyroid lobe measures 4.7 x 1.4 x 1.7 cm with homogeneous parenchyma.  Left thyroid lobe measures 4.4 x 2.3 x 1.9 cm.  Heterogeneous mostly solid, part cystic nodule measuring up to 2.7 cm.  Isthmus measures 0.3 cm.  No evidence for cervical adenopathy.     Impression:  Single measured nodule in the left lobe which meets criteria for FNA guided sampling.          Lab work reviewed  No results found for: TSH, FREET4, S4NKDTY   Antibodies  No results found for: THYROIDAB, TSIMMGLBN, THYROIDSTIMI    Reviewed past surgical, medical, family, social history and updated as appropriate.  Review of Systems: see HPI above  Objective:   /81    Pulse 84   Temp 97.8 °F (36.6 °C) (Oral)   Wt 89.5 kg (197 lb 6.4 oz)   BMI 26.77 kg/m²   Body mass index is 26.77 kg/m².  Vital signs reviewed    Physical Exam  Vitals and nursing note reviewed.   Constitutional:       General: He is not in acute distress.     Appearance: Normal appearance. He is well-developed. He is not toxic-appearing.   Neck:      Thyroid: No thyromegaly.      Comments: No thyroid enlargement noted  Cardiovascular:      Heart sounds: Normal heart sounds.   Pulmonary:      Effort: Pulmonary effort is normal. No respiratory distress.   Abdominal:      Tenderness: There is no abdominal tenderness.   Musculoskeletal:         General: No deformity. Normal range of motion.      Cervical back: Normal range of motion.   Skin:     Findings: No bruising.   Neurological:      Mental Status: He is alert and oriented to person, place, and time.   Psychiatric:         Mood and Affect: Mood normal.     Lab Reviewed:  See results in subjective  No results found for: HGBA1C  No results found for: CHOL, HDL, LDLCALC, TRIG, CHOLHDL  Lab Results   Component Value Date     06/12/2023    K 4.1 06/12/2023     06/12/2023    CO2 25 06/12/2023    GLU 98 06/12/2023    BUN 13 06/12/2023    CREATININE 1.1 06/12/2023    CALCIUM 9.3 06/12/2023    PHOS 3.7 04/11/2023    PROT 7.4 06/12/2023    ALBUMIN 3.6 06/12/2023    BILITOT 0.3 06/12/2023    ALKPHOS 90 06/12/2023    AST 15 06/12/2023    ALT 17 06/12/2023    ANIONGAP 7 (L) 06/12/2023    ESTGFRAFRICA >60.0 07/13/2022    EGFRNONAA >60.0 07/13/2022     Assessment     1. Thyroid nodule  TSH    T4, Free    US Soft Tissue Head Neck Thyroid         Plan     No problem-specific Assessment & Plan notes found for this encounter.    Thyroid nodule  - incidental finding of left thyroid nodule  2.7 cm in size  - ultrasound images review, thyroid nodule size, characteristic discussed with patient in clinic today  - status post left thyroid nodule biopsy 06/2023: Pathology  benign  - patient denies any compressive symptoms, unlikely neck pain to be related to thyroid nodule  - check TSH and free T4 for bloodwork today to evaluate thyroid function  - given negative FNA, advise repeat thyroid ultrasound in 2024 for evaluation and monitoring  - routine follow-up with PCP    Advised patient to follow up with PCP for routine health maintenance care.   RTC in yearly with ultrasound prior to visit    Edgardo Villatoro M.D.  Endocrinology  [facility]  6/26/2023      Disclaimer: This note has been generated in part with the use of voice-recognition software. There may be typographical errors that have been missed during proof-reading.

## 2023-06-27 ENCOUNTER — OFFICE VISIT (OUTPATIENT)
Dept: HEMATOLOGY/ONCOLOGY | Facility: CLINIC | Age: 68
End: 2023-06-27
Payer: MEDICARE

## 2023-06-27 ENCOUNTER — INFUSION (OUTPATIENT)
Dept: INFUSION THERAPY | Facility: HOSPITAL | Age: 68
End: 2023-06-27
Attending: INTERNAL MEDICINE
Payer: MEDICARE

## 2023-06-27 VITALS
WEIGHT: 198.88 LBS | SYSTOLIC BLOOD PRESSURE: 134 MMHG | HEART RATE: 84 BPM | OXYGEN SATURATION: 97 % | HEIGHT: 72 IN | BODY MASS INDEX: 26.94 KG/M2 | DIASTOLIC BLOOD PRESSURE: 81 MMHG

## 2023-06-27 VITALS
TEMPERATURE: 99 F | SYSTOLIC BLOOD PRESSURE: 134 MMHG | OXYGEN SATURATION: 96 % | DIASTOLIC BLOOD PRESSURE: 74 MMHG | RESPIRATION RATE: 18 BRPM | HEART RATE: 80 BPM

## 2023-06-27 DIAGNOSIS — C17.9 ADENOCARCINOMA OF SMALL BOWEL: Primary | ICD-10-CM

## 2023-06-27 DIAGNOSIS — Z79.899 IMMUNODEFICIENCY SECONDARY TO CHEMOTHERAPY: ICD-10-CM

## 2023-06-27 DIAGNOSIS — E04.1 THYROID NODULE: ICD-10-CM

## 2023-06-27 DIAGNOSIS — T45.1X5A IMMUNODEFICIENCY SECONDARY TO CHEMOTHERAPY: ICD-10-CM

## 2023-06-27 DIAGNOSIS — D84.821 IMMUNODEFICIENCY SECONDARY TO CHEMOTHERAPY: ICD-10-CM

## 2023-06-27 DIAGNOSIS — Z09 FOLLOW-UP EXAM: ICD-10-CM

## 2023-06-27 PROCEDURE — 3079F DIAST BP 80-89 MM HG: CPT | Mod: CPTII,S$GLB,, | Performed by: STUDENT IN AN ORGANIZED HEALTH CARE EDUCATION/TRAINING PROGRAM

## 2023-06-27 PROCEDURE — 63600175 PHARM REV CODE 636 W HCPCS: Performed by: STUDENT IN AN ORGANIZED HEALTH CARE EDUCATION/TRAINING PROGRAM

## 2023-06-27 PROCEDURE — 3288F FALL RISK ASSESSMENT DOCD: CPT | Mod: CPTII,S$GLB,, | Performed by: STUDENT IN AN ORGANIZED HEALTH CARE EDUCATION/TRAINING PROGRAM

## 2023-06-27 PROCEDURE — 1101F PT FALLS ASSESS-DOCD LE1/YR: CPT | Mod: CPTII,S$GLB,, | Performed by: STUDENT IN AN ORGANIZED HEALTH CARE EDUCATION/TRAINING PROGRAM

## 2023-06-27 PROCEDURE — 1159F MED LIST DOCD IN RCRD: CPT | Mod: CPTII,S$GLB,, | Performed by: STUDENT IN AN ORGANIZED HEALTH CARE EDUCATION/TRAINING PROGRAM

## 2023-06-27 PROCEDURE — 1126F AMNT PAIN NOTED NONE PRSNT: CPT | Mod: CPTII,S$GLB,, | Performed by: STUDENT IN AN ORGANIZED HEALTH CARE EDUCATION/TRAINING PROGRAM

## 2023-06-27 PROCEDURE — 96416 CHEMO PROLONG INFUSE W/PUMP: CPT

## 2023-06-27 PROCEDURE — 3075F SYST BP GE 130 - 139MM HG: CPT | Mod: CPTII,S$GLB,, | Performed by: STUDENT IN AN ORGANIZED HEALTH CARE EDUCATION/TRAINING PROGRAM

## 2023-06-27 PROCEDURE — 99999 PR PBB SHADOW E&M-EST. PATIENT-LVL III: ICD-10-PCS | Mod: PBBFAC,,, | Performed by: STUDENT IN AN ORGANIZED HEALTH CARE EDUCATION/TRAINING PROGRAM

## 2023-06-27 PROCEDURE — 1101F PR PT FALLS ASSESS DOC 0-1 FALLS W/OUT INJ PAST YR: ICD-10-PCS | Mod: CPTII,S$GLB,, | Performed by: STUDENT IN AN ORGANIZED HEALTH CARE EDUCATION/TRAINING PROGRAM

## 2023-06-27 PROCEDURE — 1159F PR MEDICATION LIST DOCUMENTED IN MEDICAL RECORD: ICD-10-PCS | Mod: CPTII,S$GLB,, | Performed by: STUDENT IN AN ORGANIZED HEALTH CARE EDUCATION/TRAINING PROGRAM

## 2023-06-27 PROCEDURE — 96415 CHEMO IV INFUSION ADDL HR: CPT

## 2023-06-27 PROCEDURE — 3008F PR BODY MASS INDEX (BMI) DOCUMENTED: ICD-10-PCS | Mod: CPTII,S$GLB,, | Performed by: STUDENT IN AN ORGANIZED HEALTH CARE EDUCATION/TRAINING PROGRAM

## 2023-06-27 PROCEDURE — 96411 CHEMO IV PUSH ADDL DRUG: CPT

## 2023-06-27 PROCEDURE — 96367 TX/PROPH/DG ADDL SEQ IV INF: CPT

## 2023-06-27 PROCEDURE — 99215 PR OFFICE/OUTPT VISIT, EST, LEVL V, 40-54 MIN: ICD-10-PCS | Mod: S$GLB,,, | Performed by: STUDENT IN AN ORGANIZED HEALTH CARE EDUCATION/TRAINING PROGRAM

## 2023-06-27 PROCEDURE — 4010F PR ACE/ARB THEARPY RXD/TAKEN: ICD-10-PCS | Mod: CPTII,S$GLB,, | Performed by: STUDENT IN AN ORGANIZED HEALTH CARE EDUCATION/TRAINING PROGRAM

## 2023-06-27 PROCEDURE — 99999 PR PBB SHADOW E&M-EST. PATIENT-LVL III: CPT | Mod: PBBFAC,,, | Performed by: STUDENT IN AN ORGANIZED HEALTH CARE EDUCATION/TRAINING PROGRAM

## 2023-06-27 PROCEDURE — 96368 THER/DIAG CONCURRENT INF: CPT

## 2023-06-27 PROCEDURE — 3079F PR MOST RECENT DIASTOLIC BLOOD PRESSURE 80-89 MM HG: ICD-10-PCS | Mod: CPTII,S$GLB,, | Performed by: STUDENT IN AN ORGANIZED HEALTH CARE EDUCATION/TRAINING PROGRAM

## 2023-06-27 PROCEDURE — 96413 CHEMO IV INFUSION 1 HR: CPT

## 2023-06-27 PROCEDURE — 3008F BODY MASS INDEX DOCD: CPT | Mod: CPTII,S$GLB,, | Performed by: STUDENT IN AN ORGANIZED HEALTH CARE EDUCATION/TRAINING PROGRAM

## 2023-06-27 PROCEDURE — 4010F ACE/ARB THERAPY RXD/TAKEN: CPT | Mod: CPTII,S$GLB,, | Performed by: STUDENT IN AN ORGANIZED HEALTH CARE EDUCATION/TRAINING PROGRAM

## 2023-06-27 PROCEDURE — 96366 THER/PROPH/DIAG IV INF ADDON: CPT

## 2023-06-27 PROCEDURE — 99215 OFFICE O/P EST HI 40 MIN: CPT | Mod: S$GLB,,, | Performed by: STUDENT IN AN ORGANIZED HEALTH CARE EDUCATION/TRAINING PROGRAM

## 2023-06-27 PROCEDURE — 3075F PR MOST RECENT SYSTOLIC BLOOD PRESS GE 130-139MM HG: ICD-10-PCS | Mod: CPTII,S$GLB,, | Performed by: STUDENT IN AN ORGANIZED HEALTH CARE EDUCATION/TRAINING PROGRAM

## 2023-06-27 PROCEDURE — 1126F PR PAIN SEVERITY QUANTIFIED, NO PAIN PRESENT: ICD-10-PCS | Mod: CPTII,S$GLB,, | Performed by: STUDENT IN AN ORGANIZED HEALTH CARE EDUCATION/TRAINING PROGRAM

## 2023-06-27 PROCEDURE — 25000003 PHARM REV CODE 250: Performed by: STUDENT IN AN ORGANIZED HEALTH CARE EDUCATION/TRAINING PROGRAM

## 2023-06-27 PROCEDURE — 3288F PR FALLS RISK ASSESSMENT DOCUMENTED: ICD-10-PCS | Mod: CPTII,S$GLB,, | Performed by: STUDENT IN AN ORGANIZED HEALTH CARE EDUCATION/TRAINING PROGRAM

## 2023-06-27 RX ORDER — HEPARIN 100 UNIT/ML
500 SYRINGE INTRAVENOUS
Status: DISCONTINUED | OUTPATIENT
Start: 2023-06-27 | End: 2023-06-27 | Stop reason: HOSPADM

## 2023-06-27 RX ORDER — EPINEPHRINE 0.3 MG/.3ML
0.3 INJECTION SUBCUTANEOUS ONCE AS NEEDED
Status: CANCELLED | OUTPATIENT
Start: 2023-06-27

## 2023-06-27 RX ORDER — FLUOROURACIL 50 MG/ML
2400 INJECTION, SOLUTION INTRAVENOUS
Status: CANCELLED | OUTPATIENT
Start: 2023-06-27

## 2023-06-27 RX ORDER — SODIUM CHLORIDE 0.9 % (FLUSH) 0.9 %
10 SYRINGE (ML) INJECTION
Status: DISCONTINUED | OUTPATIENT
Start: 2023-06-27 | End: 2023-06-27 | Stop reason: HOSPADM

## 2023-06-27 RX ORDER — HEPARIN 100 UNIT/ML
500 SYRINGE INTRAVENOUS
Status: CANCELLED | OUTPATIENT
Start: 2023-06-29

## 2023-06-27 RX ORDER — SODIUM CHLORIDE 0.9 % (FLUSH) 0.9 %
10 SYRINGE (ML) INJECTION
Status: CANCELLED | OUTPATIENT
Start: 2023-06-27

## 2023-06-27 RX ORDER — HEPARIN 100 UNIT/ML
500 SYRINGE INTRAVENOUS
Status: CANCELLED | OUTPATIENT
Start: 2023-06-27

## 2023-06-27 RX ORDER — DIPHENHYDRAMINE HYDROCHLORIDE 50 MG/ML
50 INJECTION INTRAMUSCULAR; INTRAVENOUS ONCE AS NEEDED
Status: CANCELLED | OUTPATIENT
Start: 2023-06-27

## 2023-06-27 RX ORDER — FLUOROURACIL 50 MG/ML
400 INJECTION, SOLUTION INTRAVENOUS
Status: CANCELLED | OUTPATIENT
Start: 2023-06-27

## 2023-06-27 RX ORDER — FLUOROURACIL 50 MG/ML
400 INJECTION, SOLUTION INTRAVENOUS
Status: COMPLETED | OUTPATIENT
Start: 2023-06-27 | End: 2023-06-27

## 2023-06-27 RX ORDER — SODIUM CHLORIDE 0.9 % (FLUSH) 0.9 %
10 SYRINGE (ML) INJECTION
Status: CANCELLED | OUTPATIENT
Start: 2023-06-29

## 2023-06-27 RX ADMIN — LEUCOVORIN CALCIUM 830 MG: 500 INJECTION, POWDER, LYOPHILIZED, FOR SOLUTION INTRAMUSCULAR; INTRAVENOUS at 09:06

## 2023-06-27 RX ADMIN — FLUOROURACIL 4990 MG: 50 INJECTION, SOLUTION INTRAVENOUS at 11:06

## 2023-06-27 RX ADMIN — OXALIPLATIN 177 MG: 5 INJECTION, SOLUTION INTRAVENOUS at 09:06

## 2023-06-27 RX ADMIN — DEXAMETHASONE SODIUM PHOSPHATE 0.25 MG: 10 INJECTION, SOLUTION INTRAMUSCULAR; INTRAVENOUS at 08:06

## 2023-06-27 RX ADMIN — FLUOROURACIL 830 MG: 50 INJECTION, SOLUTION INTRAVENOUS at 11:06

## 2023-06-27 NOTE — PROGRESS NOTES
Patient, Poli Young (MRN #[id number]), presented with a recent Platelet count less than 150 K/uL consistent with the definition of thrombocytopenia (ICD10 - D69.6).    Platelets   Date Value Ref Range Status   06/26/2023 141 (L) 150 - 450 K/uL Final     The patient's thrombocytopenia was monitored, evaluated, addressed and/or treated. This addendum to the medical record is made on 06/27/2023.

## 2023-06-27 NOTE — PLAN OF CARE
Pt arrived to unit for C4D1 of FOLFOX. Completed labs and a follow-up in clinic with  prior to. Pt okay to proceed with tx today. VSS. Voices no new or worsening complaints today. Is eating well and maintaining his weight. Denies any worsening cold sensitivity or neuropathy since his last cycle. Plan of care reviewed. Pre-med of Aloxi/Dex given. Oxaliplatin infused over 2 hours with Leucovorin infusing concurrently. Tolerated well. Pt given his 5FU bolus over 5 minutes. 5FU pump connected at 1115. Pt and spouse instructed to return on Thursday, 6/29, at 9:15AM for his pump d/c. Verbalized understanding. Pt discharged from unit in NAD.

## 2023-06-27 NOTE — PROGRESS NOTES
Subjective     Patient ID: Poli Young is a 67 y.o. male.    Chief Complaint: Follow-up and Chemotherapy  Diagnosis: Small bowel adenocarcinoma .pT3 pN2cM0  Therapy:status post small bowel resection on 4/4/23                Adjuvant mFOLFOX 5/16/23-   HPI  67M underwent work up for anemia and was found to have a small bowel mass about 100cm from IC valve by enteroscopy by Dr Cruz. Got one blood transfusion. He reports history of left central intermittent abdominal pain for several months. No NV. Was found to be anemic on bloodwork. EGD in June 2022 did not show any obvious cause of bleeding.  Capsule endoscopy in January showed blood in small bowel. Enteroscopy showed mass in March that was biopsied and tattooed. Biopsy positive for adenocarcinoma. He is status post small bowel resection on 4/4/23. Pathology positive for  Invasive adenocarcinoma with partial mucinous component (40% of tumor) Lymphovascular invasion is present 17 lymph nodes, 3 positive for metastatic carcinoma (3/17) .Resection margins free of tumor  Pathologic stage pT3 pN Category:  pN2 . Today , he is doing well. Denies dizziness, weakness. No CP, SOB.Appetite improving postop.CT a/p w/contrast 4/13/23 shows Postoperative changes from partial small-bowel resection for a small bowel tumor . No evidence of distant metastases.      Interval Hx:   S/p C1 completed 5/16/23  No SOB/CP  No N/V  No tingling/numbness in extremities      Sochx: Retired . Never smoked. Drinks etoh occasionally.   .       Past Medical History:   Diagnosis Date    Hyperlipemia     Hypertension        Past Surgical History:   Procedure Laterality Date    DIAGNOSTIC LAPAROSCOPY N/A 4/4/2023    Procedure: LAPAROSCOPY, DIAGNOSTIC;  Surgeon: Tyrone Martínez MD;  Location: [facility] OR;  Service: General;  Laterality: N/A;    ESOPHAGOGASTRODUODENOSCOPY N/A 6/8/2022    Procedure: EGD (ESOPHAGOGASTRODUODENOSCOPY);  Surgeon: Aamir Rushing MD;  Location: [facility]  ENDO;  Service: Endoscopy;  Laterality: N/A;    EXCISION, SMALL INTESTINE N/A 4/4/2023    Procedure: EXCISION, SMALL INTESTINE/SMALL BOWEL RESECTION;  Surgeon: Tyrone Martínez MD;  Location: [facility] OR;  Service: General;  Laterality: N/A;    INSERTION OF VENOUS ACCESS PORT Right 5/2/2023    Procedure: INSERTION, VENOUS ACCESS PORT;  Surgeon: Tyrone Martínez MD;  Location: [facility] OR;  Service: General;  Laterality: Right;    INTRALUMINAL GASTROINTESTINAL TRACT IMAGING VIA CAPSULE N/A 1/20/2023    Procedure: IMAGING PROCEDURE, GI TRACT, INTRALUMINAL, VIA CAPSULE;  Surgeon: Aamir Rushing MD;  Location: [facility] ENDO;  Service: Endoscopy;  Laterality: N/A;    LAPAROTOMY, EXPLORATORY N/A 4/4/2023    Procedure: LAPAROTOMY, EXPLORATORY;  Surgeon: Tyrone Martínez MD;  Location: [facility] OR;  Service: General;  Laterality: N/A;    SMALL BOWEL ENTEROSCOPY N/A 3/20/2023    Procedure: ENTEROSCOPY Upper DBE;  Surgeon: Giancarlo Cruz MD;  Location: [facility] ENDO;  Service: Endoscopy;  Laterality: N/A;        Social History     Tobacco Use    Smoking status: Never    Smokeless tobacco: Never   Substance Use Topics    Alcohol use: Yes     Comment: socially    Drug use: Never        Review of Systems   Constitutional:  Negative for appetite change, fatigue, fever and unexpected weight change.   HENT:  Negative for mouth sores.    Eyes:  Negative for visual disturbance.   Respiratory:  Negative for cough and shortness of breath.    Cardiovascular:  Negative for chest pain.   Gastrointestinal:  Negative for abdominal pain and diarrhea.   Genitourinary:  Negative for frequency.   Musculoskeletal:  Negative for back pain.   Integumentary:  Negative for rash.   Neurological:  Negative for headaches.   Hematological:  Negative for adenopathy.   Psychiatric/Behavioral:  The patient is not nervous/anxious.         Objective       There were no vitals filed for this visit.        Physical Exam  Constitutional:       Appearance: He is  well-developed.   HENT:      Head: Normocephalic.      Mouth/Throat:      Pharynx: No oropharyngeal exudate.   Eyes:      General: No scleral icterus.        Right eye: No discharge.         Left eye: No discharge.      Conjunctiva/sclera: Conjunctivae normal.   Neck:      Thyroid: No thyromegaly.   Cardiovascular:      Rate and Rhythm: Normal rate and regular rhythm.      Heart sounds: Normal heart sounds. No murmur heard.  Pulmonary:      Effort: Pulmonary effort is normal.      Breath sounds: Normal breath sounds. No wheezing or rales.   Abdominal:      General: Bowel sounds are normal.      Palpations: Abdomen is soft.      Tenderness: There is no abdominal tenderness. There is no guarding or rebound.   Musculoskeletal:         General: No swelling. Normal range of motion.      Cervical back: Normal range of motion and neck supple.   Lymphadenopathy:      Cervical: No cervical adenopathy.      Upper Body:      Right upper body: No supraclavicular adenopathy.      Left upper body: No supraclavicular adenopathy.   Skin:     Findings: No erythema or rash.   Neurological:      Mental Status: He is alert and oriented to person, place, and time.      Cranial Nerves: No cranial nerve deficit.   Psychiatric:         Mood and Affect: Mood normal.         Behavior: Behavior normal.            Pathology 4/4/23  Small intestine, segmental resection:   - Invasive adenocarcinoma with partial mucinous component (40% of tumor)       - Tumor invades through muscularis propria into subserosa   - Lymphovascular invasion is present   - 17 lymph nodes, 3 positive for metastatic carcinoma (3/17)   - Resection margins free of tumor   - Background small intestinal mucosa without significant histopathologic alteration   - See CAP Tumor synoptic and comment     CAP Tumor Synoptic:   Procedure:  Segmental resection   Tumor site:  Small intestine, not otherwise specified   Histologic type:  Adenocarcinoma (not otherwise characterized),  with mucinous component comprising approximately 40% of tumor   Histologic grade:  G2, moderately differentiated   Tumor size:  5.5 cm in greatest dimension   Tumor extent:  Invades through muscularis propria into subserosa without serosal penetration   Lymphovascular invasion:  Present   Macroscopic tumor perforation:  Not identified   Margin status for invasive carcinoma:  All margins negative for invasive carcinoma   Margin status for dysplasia:  All margins negative for carcinoma in situ (high-grade dysplasia)/ adenoma   Regional lymph node status:  Tumor present in regional lymph nodes   Number of lymph nodes with tumor:  3   Number of lymph nodes examined:  17   PATHOLOGIC STAGE CLASSIFICATION   TNM descriptors:  Not applicable   pT Category:  pT3   pN Category:  pN2   Special studies:  Intact expression of DNA mismatch repair proteins (MLH1, MSH2, MSH6, PMS2) in tumor by immunohistochemistry (performed on prior biopsy KES-, collected 3/20/2023); interpreted by Dr. Garcias)   Comment:  Records from this patient's reported recent colonoscopy are not available for review.       CT a/p w/contrast 4/13/23  Impression:     Postoperative changes from partial small-bowel resection for a small bowel tumor.  Pathology results are pending.     Skin thickening and subcutaneous edema near the umbilicus, which could be inflammatory or infectious.  No organized fluid collections along the surgical incision.     Dilated small bowel loops with gradual tapering distally, likely ileus in the early postoperative setting.     Small volume ascites and trace pneumoperitoneum, likely postoperative.  Anastomotic leak is less likely.  No organized intraperitoneal collections.     Other findings as described.        CT chest w/out contrast  5/1/23   Impression:     Asymmetric thyroid gland with possible left lobe thyroid nodule measuring up to 2.2 cm.  Further evaluation with thyroid ultrasound examination is recommended.      Mild centrilobular emphysematous changes.     Mucous plugging within the left upper lobe laterally and within the right lower lobe laterally.     Cluster of pulmonary nodules within the upper left upper lobe measuring up to 0.6 cm in size.  These are likely infectious/inflammatory in etiology, however continued surveillance is recommended in this patient with history of malignancy.     Hepatic cysts.             Lab Results   Component Value Date    WBC 3.13 (L) 06/26/2023    HGB 12.6 (L) 06/26/2023    HCT 40.2 06/26/2023    MCV 86 06/26/2023     (L) 06/26/2023             Assessment and Plan     Problem List Items Addressed This Visit          Oncology    Adenocarcinoma of small bowel - Primary  Poli Young is a 67 y.o. male with biopsy proven small bowel adenocarcinoma now status post small bowel resection on 4/4/23.pT3 pN2M0  CT a/p no evidence of distant mets  Adjuvant chemotherapy for 6 mo is recommended for patients with node-positive, completely resected disease. The regimens of choice are CAPOX (capecitabine and oxaliplatin) and FOLFOX (folinic acid, fluorouracil, and oxaliplatin).  Plan mFOLFOX chemotherapy   Proceed with C4   Cbc,Fe studies,cmp  prior to f/u on 6/13/23   Cbc,cmp,prior to f/u with  Dr. Francisco on 6/27 and 7/11                       Thyroid nodule        US thyroid complete and nodule bx with benign results          Pulmonary nodules        Plan future follow up imaging           PATRICK        Plan IV Fe in future       Add CEA to f/u labs on 7/11  RTC in 2 weeks for MD visit, labs and next FOLFOX        Travis Francisco MD  [facility]

## 2023-06-29 ENCOUNTER — INFUSION (OUTPATIENT)
Dept: INFUSION THERAPY | Facility: HOSPITAL | Age: 68
End: 2023-06-29
Attending: INTERNAL MEDICINE
Payer: MEDICARE

## 2023-06-29 VITALS
HEART RATE: 81 BPM | RESPIRATION RATE: 18 BRPM | TEMPERATURE: 98 F | SYSTOLIC BLOOD PRESSURE: 130 MMHG | DIASTOLIC BLOOD PRESSURE: 72 MMHG | OXYGEN SATURATION: 97 %

## 2023-06-29 DIAGNOSIS — C17.9 ADENOCARCINOMA OF SMALL BOWEL: Primary | ICD-10-CM

## 2023-06-29 PROCEDURE — 25000003 PHARM REV CODE 250: Performed by: STUDENT IN AN ORGANIZED HEALTH CARE EDUCATION/TRAINING PROGRAM

## 2023-06-29 PROCEDURE — 63600175 PHARM REV CODE 636 W HCPCS: Performed by: STUDENT IN AN ORGANIZED HEALTH CARE EDUCATION/TRAINING PROGRAM

## 2023-06-29 PROCEDURE — A4216 STERILE WATER/SALINE, 10 ML: HCPCS | Performed by: STUDENT IN AN ORGANIZED HEALTH CARE EDUCATION/TRAINING PROGRAM

## 2023-06-29 RX ORDER — HEPARIN 100 UNIT/ML
500 SYRINGE INTRAVENOUS
Status: DISCONTINUED | OUTPATIENT
Start: 2023-06-29 | End: 2023-06-29 | Stop reason: HOSPADM

## 2023-06-29 RX ORDER — SODIUM CHLORIDE 0.9 % (FLUSH) 0.9 %
10 SYRINGE (ML) INJECTION
Status: DISCONTINUED | OUTPATIENT
Start: 2023-06-29 | End: 2023-06-29 | Stop reason: HOSPADM

## 2023-06-29 RX ADMIN — Medication 10 ML: at 08:06

## 2023-06-29 RX ADMIN — HEPARIN 500 UNITS: 100 SYRINGE at 08:06

## 2023-06-29 NOTE — PLAN OF CARE
Pt arrived to unit for his C4D3 for his pump d/c. Pt voices no new or worsening complaints today. VSS. Per 5FU pump, fluorouracil completed. Pt's PAC deaccessed and flushed without difficulty. Pt given his next upcoming appointments. Discharged from unit in NAD.

## 2023-07-03 ENCOUNTER — HOSPITAL ENCOUNTER (OUTPATIENT)
Dept: RADIOLOGY | Facility: HOSPITAL | Age: 68
Discharge: HOME | End: 2023-07-03
Attending: INTERNAL MEDICINE
Payer: MEDICARE

## 2023-07-03 DIAGNOSIS — B96.81 CHRONIC HELICOBACTER PYLORI GASTRITIS: ICD-10-CM

## 2023-07-03 DIAGNOSIS — K29.50 CHRONIC HELICOBACTER PYLORI GASTRITIS: ICD-10-CM

## 2023-07-03 DIAGNOSIS — C17.9 ADENOCARCINOMA OF SMALL BOWEL: ICD-10-CM

## 2023-07-03 PROCEDURE — 74177 CT CHEST ABDOMEN PELVIS WITH CONTRAST (XPD): ICD-10-PCS | Mod: 26,,, | Performed by: RADIOLOGY

## 2023-07-03 PROCEDURE — 25500020 PHARM REV CODE 255: Performed by: INTERNAL MEDICINE

## 2023-07-03 PROCEDURE — 71260 CT THORAX DX C+: CPT | Mod: TC

## 2023-07-03 PROCEDURE — 74177 CT ABD & PELVIS W/CONTRAST: CPT | Mod: TC

## 2023-07-03 PROCEDURE — 74177 CT ABD & PELVIS W/CONTRAST: CPT | Mod: 26,,, | Performed by: RADIOLOGY

## 2023-07-03 PROCEDURE — 71260 CT THORAX DX C+: CPT | Mod: 26,,, | Performed by: RADIOLOGY

## 2023-07-03 PROCEDURE — 71260 CT CHEST ABDOMEN PELVIS WITH CONTRAST (XPD): ICD-10-PCS | Mod: 26,,, | Performed by: RADIOLOGY

## 2023-07-03 RX ORDER — PANTOPRAZOLE SODIUM 40 MG/1
40 TABLET, DELAYED RELEASE ORAL DAILY
Qty: 90 TABLET | Refills: 3 | Status: SHIPPED | OUTPATIENT
Start: 2023-07-03

## 2023-07-03 RX ADMIN — IOHEXOL 100 ML: 350 INJECTION, SOLUTION INTRAVENOUS at 10:07

## 2023-07-03 RX ADMIN — IOHEXOL 15 ML: 300 INJECTION, SOLUTION INTRAVENOUS at 10:07

## 2023-07-05 RX ORDER — PANTOPRAZOLE SODIUM 20 MG/1
20 TABLET, DELAYED RELEASE ORAL DAILY
Qty: 30 TABLET | Refills: 11 | Status: SHIPPED | OUTPATIENT
Start: 2023-07-05 | End: 2023-08-28

## 2023-07-09 DIAGNOSIS — C17.9 ADENOCARCINOMA OF SMALL BOWEL: ICD-10-CM

## 2023-07-10 DIAGNOSIS — C17.9 ADENOCARCINOMA OF SMALL BOWEL: Primary | ICD-10-CM

## 2023-07-10 RX ORDER — DEXAMETHASONE 4 MG/1
8 TABLET ORAL DAILY
Qty: 24 TABLET | Refills: 5 | Status: SHIPPED | OUTPATIENT
Start: 2023-07-10

## 2023-07-10 RX ORDER — DEXAMETHASONE 4 MG/1
TABLET ORAL
Qty: 20 TABLET | Refills: 1 | Status: SHIPPED | OUTPATIENT
Start: 2023-07-10

## 2023-07-11 ENCOUNTER — OFFICE VISIT (OUTPATIENT)
Dept: HEMATOLOGY/ONCOLOGY | Facility: CLINIC | Age: 68
End: 2023-07-11
Payer: MEDICARE

## 2023-07-11 ENCOUNTER — INFUSION (OUTPATIENT)
Dept: INFUSION THERAPY | Facility: HOSPITAL | Age: 68
End: 2023-07-11
Attending: INTERNAL MEDICINE
Payer: MEDICARE

## 2023-07-11 VITALS
DIASTOLIC BLOOD PRESSURE: 79 MMHG | WEIGHT: 197.31 LBS | TEMPERATURE: 98 F | SYSTOLIC BLOOD PRESSURE: 135 MMHG | RESPIRATION RATE: 18 BRPM | HEIGHT: 72 IN | HEART RATE: 90 BPM | OXYGEN SATURATION: 97 % | BODY MASS INDEX: 26.73 KG/M2 | OXYGEN SATURATION: 98 % | SYSTOLIC BLOOD PRESSURE: 113 MMHG | HEART RATE: 86 BPM | DIASTOLIC BLOOD PRESSURE: 76 MMHG

## 2023-07-11 DIAGNOSIS — Z79.899 IMMUNODEFICIENCY SECONDARY TO CHEMOTHERAPY: ICD-10-CM

## 2023-07-11 DIAGNOSIS — T45.1X5A CHEMOTHERAPY-INDUCED NEUROPATHY: ICD-10-CM

## 2023-07-11 DIAGNOSIS — E04.1 THYROID NODULE: ICD-10-CM

## 2023-07-11 DIAGNOSIS — C17.9 ADENOCARCINOMA OF SMALL BOWEL: Primary | ICD-10-CM

## 2023-07-11 DIAGNOSIS — Z09 FOLLOW-UP EXAM: ICD-10-CM

## 2023-07-11 DIAGNOSIS — T45.1X5A IMMUNODEFICIENCY SECONDARY TO CHEMOTHERAPY: ICD-10-CM

## 2023-07-11 DIAGNOSIS — R91.8 PULMONARY NODULES: ICD-10-CM

## 2023-07-11 DIAGNOSIS — D84.821 IMMUNODEFICIENCY SECONDARY TO CHEMOTHERAPY: ICD-10-CM

## 2023-07-11 DIAGNOSIS — G62.0 CHEMOTHERAPY-INDUCED NEUROPATHY: ICD-10-CM

## 2023-07-11 PROCEDURE — 3075F PR MOST RECENT SYSTOLIC BLOOD PRESS GE 130-139MM HG: ICD-10-PCS | Mod: CPTII,S$GLB,, | Performed by: STUDENT IN AN ORGANIZED HEALTH CARE EDUCATION/TRAINING PROGRAM

## 2023-07-11 PROCEDURE — 3075F SYST BP GE 130 - 139MM HG: CPT | Mod: CPTII,S$GLB,, | Performed by: STUDENT IN AN ORGANIZED HEALTH CARE EDUCATION/TRAINING PROGRAM

## 2023-07-11 PROCEDURE — 1126F AMNT PAIN NOTED NONE PRSNT: CPT | Mod: CPTII,S$GLB,, | Performed by: STUDENT IN AN ORGANIZED HEALTH CARE EDUCATION/TRAINING PROGRAM

## 2023-07-11 PROCEDURE — 4010F PR ACE/ARB THEARPY RXD/TAKEN: ICD-10-PCS | Mod: CPTII,S$GLB,, | Performed by: STUDENT IN AN ORGANIZED HEALTH CARE EDUCATION/TRAINING PROGRAM

## 2023-07-11 PROCEDURE — 25000003 PHARM REV CODE 250: Performed by: STUDENT IN AN ORGANIZED HEALTH CARE EDUCATION/TRAINING PROGRAM

## 2023-07-11 PROCEDURE — 96411 CHEMO IV PUSH ADDL DRUG: CPT

## 2023-07-11 PROCEDURE — 99999 PR PBB SHADOW E&M-EST. PATIENT-LVL III: ICD-10-PCS | Mod: PBBFAC,,, | Performed by: STUDENT IN AN ORGANIZED HEALTH CARE EDUCATION/TRAINING PROGRAM

## 2023-07-11 PROCEDURE — 1159F PR MEDICATION LIST DOCUMENTED IN MEDICAL RECORD: ICD-10-PCS | Mod: CPTII,S$GLB,, | Performed by: STUDENT IN AN ORGANIZED HEALTH CARE EDUCATION/TRAINING PROGRAM

## 2023-07-11 PROCEDURE — 96413 CHEMO IV INFUSION 1 HR: CPT

## 2023-07-11 PROCEDURE — 96415 CHEMO IV INFUSION ADDL HR: CPT

## 2023-07-11 PROCEDURE — 99215 OFFICE O/P EST HI 40 MIN: CPT | Mod: S$GLB,,, | Performed by: STUDENT IN AN ORGANIZED HEALTH CARE EDUCATION/TRAINING PROGRAM

## 2023-07-11 PROCEDURE — 3078F DIAST BP <80 MM HG: CPT | Mod: CPTII,S$GLB,, | Performed by: STUDENT IN AN ORGANIZED HEALTH CARE EDUCATION/TRAINING PROGRAM

## 2023-07-11 PROCEDURE — 3008F PR BODY MASS INDEX (BMI) DOCUMENTED: ICD-10-PCS | Mod: CPTII,S$GLB,, | Performed by: STUDENT IN AN ORGANIZED HEALTH CARE EDUCATION/TRAINING PROGRAM

## 2023-07-11 PROCEDURE — 99999 PR PBB SHADOW E&M-EST. PATIENT-LVL III: CPT | Mod: PBBFAC,,, | Performed by: STUDENT IN AN ORGANIZED HEALTH CARE EDUCATION/TRAINING PROGRAM

## 2023-07-11 PROCEDURE — 63600175 PHARM REV CODE 636 W HCPCS: Performed by: STUDENT IN AN ORGANIZED HEALTH CARE EDUCATION/TRAINING PROGRAM

## 2023-07-11 PROCEDURE — 1126F PR PAIN SEVERITY QUANTIFIED, NO PAIN PRESENT: ICD-10-PCS | Mod: CPTII,S$GLB,, | Performed by: STUDENT IN AN ORGANIZED HEALTH CARE EDUCATION/TRAINING PROGRAM

## 2023-07-11 PROCEDURE — 4010F ACE/ARB THERAPY RXD/TAKEN: CPT | Mod: CPTII,S$GLB,, | Performed by: STUDENT IN AN ORGANIZED HEALTH CARE EDUCATION/TRAINING PROGRAM

## 2023-07-11 PROCEDURE — 96416 CHEMO PROLONG INFUSE W/PUMP: CPT

## 2023-07-11 PROCEDURE — 1101F PT FALLS ASSESS-DOCD LE1/YR: CPT | Mod: CPTII,S$GLB,, | Performed by: STUDENT IN AN ORGANIZED HEALTH CARE EDUCATION/TRAINING PROGRAM

## 2023-07-11 PROCEDURE — 99215 PR OFFICE/OUTPT VISIT, EST, LEVL V, 40-54 MIN: ICD-10-PCS | Mod: S$GLB,,, | Performed by: STUDENT IN AN ORGANIZED HEALTH CARE EDUCATION/TRAINING PROGRAM

## 2023-07-11 PROCEDURE — 1159F MED LIST DOCD IN RCRD: CPT | Mod: CPTII,S$GLB,, | Performed by: STUDENT IN AN ORGANIZED HEALTH CARE EDUCATION/TRAINING PROGRAM

## 2023-07-11 PROCEDURE — 96366 THER/PROPH/DIAG IV INF ADDON: CPT

## 2023-07-11 PROCEDURE — 3288F PR FALLS RISK ASSESSMENT DOCUMENTED: ICD-10-PCS | Mod: CPTII,S$GLB,, | Performed by: STUDENT IN AN ORGANIZED HEALTH CARE EDUCATION/TRAINING PROGRAM

## 2023-07-11 PROCEDURE — 96368 THER/DIAG CONCURRENT INF: CPT

## 2023-07-11 PROCEDURE — 1101F PR PT FALLS ASSESS DOC 0-1 FALLS W/OUT INJ PAST YR: ICD-10-PCS | Mod: CPTII,S$GLB,, | Performed by: STUDENT IN AN ORGANIZED HEALTH CARE EDUCATION/TRAINING PROGRAM

## 2023-07-11 PROCEDURE — 3078F PR MOST RECENT DIASTOLIC BLOOD PRESSURE < 80 MM HG: ICD-10-PCS | Mod: CPTII,S$GLB,, | Performed by: STUDENT IN AN ORGANIZED HEALTH CARE EDUCATION/TRAINING PROGRAM

## 2023-07-11 PROCEDURE — 3008F BODY MASS INDEX DOCD: CPT | Mod: CPTII,S$GLB,, | Performed by: STUDENT IN AN ORGANIZED HEALTH CARE EDUCATION/TRAINING PROGRAM

## 2023-07-11 PROCEDURE — 3288F FALL RISK ASSESSMENT DOCD: CPT | Mod: CPTII,S$GLB,, | Performed by: STUDENT IN AN ORGANIZED HEALTH CARE EDUCATION/TRAINING PROGRAM

## 2023-07-11 PROCEDURE — 96367 TX/PROPH/DG ADDL SEQ IV INF: CPT

## 2023-07-11 RX ORDER — SODIUM CHLORIDE 0.9 % (FLUSH) 0.9 %
10 SYRINGE (ML) INJECTION
Status: CANCELLED | OUTPATIENT
Start: 2023-07-13

## 2023-07-11 RX ORDER — EPINEPHRINE 0.3 MG/.3ML
0.3 INJECTION SUBCUTANEOUS ONCE AS NEEDED
Status: CANCELLED | OUTPATIENT
Start: 2023-07-11

## 2023-07-11 RX ORDER — HEPARIN 100 UNIT/ML
500 SYRINGE INTRAVENOUS
Status: CANCELLED | OUTPATIENT
Start: 2023-07-13

## 2023-07-11 RX ORDER — HEPARIN 100 UNIT/ML
500 SYRINGE INTRAVENOUS
Status: DISCONTINUED | OUTPATIENT
Start: 2023-07-11 | End: 2023-07-11 | Stop reason: HOSPADM

## 2023-07-11 RX ORDER — DIPHENHYDRAMINE HYDROCHLORIDE 50 MG/ML
50 INJECTION INTRAMUSCULAR; INTRAVENOUS ONCE AS NEEDED
Status: CANCELLED | OUTPATIENT
Start: 2023-07-11

## 2023-07-11 RX ORDER — FLUOROURACIL 50 MG/ML
2400 INJECTION, SOLUTION INTRAVENOUS
Status: CANCELLED | OUTPATIENT
Start: 2023-07-11

## 2023-07-11 RX ORDER — HEPARIN 100 UNIT/ML
500 SYRINGE INTRAVENOUS
Status: CANCELLED | OUTPATIENT
Start: 2023-07-11

## 2023-07-11 RX ORDER — FLUOROURACIL 50 MG/ML
400 INJECTION, SOLUTION INTRAVENOUS
Status: CANCELLED | OUTPATIENT
Start: 2023-07-11

## 2023-07-11 RX ORDER — FLUOROURACIL 50 MG/ML
400 INJECTION, SOLUTION INTRAVENOUS
Status: COMPLETED | OUTPATIENT
Start: 2023-07-11 | End: 2023-07-11

## 2023-07-11 RX ORDER — SODIUM CHLORIDE 0.9 % (FLUSH) 0.9 %
10 SYRINGE (ML) INJECTION
Status: DISCONTINUED | OUTPATIENT
Start: 2023-07-11 | End: 2023-07-11 | Stop reason: HOSPADM

## 2023-07-11 RX ORDER — ONDANSETRON 8 MG/1
8 TABLET, ORALLY DISINTEGRATING ORAL EVERY 8 HOURS PRN
Qty: 60 TABLET | Refills: 5 | Status: SHIPPED | OUTPATIENT
Start: 2023-07-11

## 2023-07-11 RX ORDER — SODIUM CHLORIDE 0.9 % (FLUSH) 0.9 %
10 SYRINGE (ML) INJECTION
Status: CANCELLED | OUTPATIENT
Start: 2023-07-11

## 2023-07-11 RX ADMIN — FLUOROURACIL 830 MG: 50 INJECTION, SOLUTION INTRAVENOUS at 11:07

## 2023-07-11 RX ADMIN — FLUOROURACIL 4990 MG: 50 INJECTION, SOLUTION INTRAVENOUS at 11:07

## 2023-07-11 RX ADMIN — DEXAMETHASONE SODIUM PHOSPHATE 0.25 MG: 10 INJECTION, SOLUTION INTRAMUSCULAR; INTRAVENOUS at 08:07

## 2023-07-11 RX ADMIN — OXALIPLATIN 177 MG: 5 INJECTION, SOLUTION INTRAVENOUS at 09:07

## 2023-07-11 RX ADMIN — LEUCOVORIN CALCIUM 830 MG: 500 INJECTION, POWDER, LYOPHILIZED, FOR SOLUTION INTRAMUSCULAR; INTRAVENOUS at 09:07

## 2023-07-11 NOTE — PLAN OF CARE
Pt arrived to unit for C5D1 of FOLFOX. Completed labs and a follow-up in clinic with  prior to. Pt okay to proceed with tx today. VSS. Voices no new or worsening complaints today. Plan of care reviewed. Pre-med of Aloxi/Dex given. Oxaliplatin infused over 2 hours with Leucovorin infusing concurrently. Tolerated well. Pt given his 5FU bolus over 5 minutes. 5FU pump connected at 1125. Pt and spouse instructed to return on Thursday, 7/13, at 9:15AM for his pump d/c. Verbalized understanding. Pt discharged from unit in NAD.

## 2023-07-11 NOTE — PROGRESS NOTES
Subjective     Patient ID: Poli Young is a 67 y.o. male.    Chief Complaint: No chief complaint on file.  Diagnosis: Small bowel adenocarcinoma .pT3 pN2cM0  Therapy:status post small bowel resection on 4/4/23                Adjuvant mFOLFOX 5/16/23-   HPI  67M underwent work up for anemia and was found to have a small bowel mass about 100cm from IC valve by enteroscopy by Dr Cruz. Got one blood transfusion. He reports history of left central intermittent abdominal pain for several months. No NV. Was found to be anemic on bloodwork. EGD in June 2022 did not show any obvious cause of bleeding.  Capsule endoscopy in January showed blood in small bowel. Enteroscopy showed mass in March that was biopsied and tattooed. Biopsy positive for adenocarcinoma. He is status post small bowel resection on 4/4/23. Pathology positive for  Invasive adenocarcinoma with partial mucinous component (40% of tumor) Lymphovascular invasion is present 17 lymph nodes, 3 positive for metastatic carcinoma (3/17) .Resection margins free of tumor  Pathologic stage pT3 pN Category:  pN2 . Today , he is doing well. Denies dizziness, weakness. No CP, SOB.Appetite improving postop.CT a/p w/contrast 4/13/23 shows Postoperative changes from partial small-bowel resection for a small bowel tumor . No evidence of distant metastases.      Interval Hx:   S/p C1 completed 5/16/23  No SOB/CP  No N/V  No tingling/numbness in extremities      Sochx: Retired . Never smoked. Drinks etoh occasionally.   .       Past Medical History:   Diagnosis Date    Hyperlipemia     Hypertension        Past Surgical History:   Procedure Laterality Date    DIAGNOSTIC LAPAROSCOPY N/A 4/4/2023    Procedure: LAPAROSCOPY, DIAGNOSTIC;  Surgeon: Tyrone Martínez MD;  Location: [facility] OR;  Service: General;  Laterality: N/A;    ESOPHAGOGASTRODUODENOSCOPY N/A 6/8/2022    Procedure: EGD (ESOPHAGOGASTRODUODENOSCOPY);  Surgeon: Aamir Rushing MD;  Location: [facility]  ENDO;  Service: Endoscopy;  Laterality: N/A;    EXCISION, SMALL INTESTINE N/A 4/4/2023    Procedure: EXCISION, SMALL INTESTINE/SMALL BOWEL RESECTION;  Surgeon: Tyrone Martínez MD;  Location: [facility] OR;  Service: General;  Laterality: N/A;    INSERTION OF VENOUS ACCESS PORT Right 5/2/2023    Procedure: INSERTION, VENOUS ACCESS PORT;  Surgeon: Tyrone Martínez MD;  Location: [facility] OR;  Service: General;  Laterality: Right;    INTRALUMINAL GASTROINTESTINAL TRACT IMAGING VIA CAPSULE N/A 1/20/2023    Procedure: IMAGING PROCEDURE, GI TRACT, INTRALUMINAL, VIA CAPSULE;  Surgeon: Aamir Rushing MD;  Location: [facility] ENDO;  Service: Endoscopy;  Laterality: N/A;    LAPAROTOMY, EXPLORATORY N/A 4/4/2023    Procedure: LAPAROTOMY, EXPLORATORY;  Surgeon: Tyrone Martínez MD;  Location: [facility] OR;  Service: General;  Laterality: N/A;    SMALL BOWEL ENTEROSCOPY N/A 3/20/2023    Procedure: ENTEROSCOPY Upper DBE;  Surgeon: Giancarlo Cruz MD;  Location: [facility] ENDO;  Service: Endoscopy;  Laterality: N/A;        Social History     Tobacco Use    Smoking status: Never    Smokeless tobacco: Never   Substance Use Topics    Alcohol use: Yes     Comment: socially    Drug use: Never        Review of Systems   Constitutional:  Negative for appetite change, fatigue, fever and unexpected weight change.   HENT:  Negative for mouth sores.    Eyes:  Negative for visual disturbance.   Respiratory:  Negative for cough and shortness of breath.    Cardiovascular:  Negative for chest pain.   Gastrointestinal:  Negative for abdominal pain and diarrhea.   Genitourinary:  Negative for frequency.   Musculoskeletal:  Negative for back pain.   Integumentary:  Negative for rash.   Neurological:  Negative for headaches.   Hematological:  Negative for adenopathy.   Psychiatric/Behavioral:  The patient is not nervous/anxious.         Objective       There were no vitals filed for this visit.        Physical Exam  Constitutional:       Appearance: He is  well-developed.   HENT:      Head: Normocephalic.      Mouth/Throat:      Pharynx: No oropharyngeal exudate.   Eyes:      General: No scleral icterus.        Right eye: No discharge.         Left eye: No discharge.      Conjunctiva/sclera: Conjunctivae normal.   Neck:      Thyroid: No thyromegaly.   Cardiovascular:      Rate and Rhythm: Normal rate and regular rhythm.      Heart sounds: Normal heart sounds. No murmur heard.  Pulmonary:      Effort: Pulmonary effort is normal.      Breath sounds: Normal breath sounds. No wheezing or rales.   Abdominal:      General: Bowel sounds are normal.      Palpations: Abdomen is soft.      Tenderness: There is no abdominal tenderness. There is no guarding or rebound.   Musculoskeletal:         General: No swelling. Normal range of motion.      Cervical back: Normal range of motion and neck supple.   Lymphadenopathy:      Cervical: No cervical adenopathy.      Upper Body:      Right upper body: No supraclavicular adenopathy.      Left upper body: No supraclavicular adenopathy.   Skin:     Findings: No erythema or rash.   Neurological:      Mental Status: He is alert and oriented to person, place, and time.      Cranial Nerves: No cranial nerve deficit.   Psychiatric:         Mood and Affect: Mood normal.         Behavior: Behavior normal.            Pathology 4/4/23  Small intestine, segmental resection:   - Invasive adenocarcinoma with partial mucinous component (40% of tumor)       - Tumor invades through muscularis propria into subserosa   - Lymphovascular invasion is present   - 17 lymph nodes, 3 positive for metastatic carcinoma (3/17)   - Resection margins free of tumor   - Background small intestinal mucosa without significant histopathologic alteration   - See CAP Tumor synoptic and comment     CAP Tumor Synoptic:   Procedure:  Segmental resection   Tumor site:  Small intestine, not otherwise specified   Histologic type:  Adenocarcinoma (not otherwise characterized),  with mucinous component comprising approximately 40% of tumor   Histologic grade:  G2, moderately differentiated   Tumor size:  5.5 cm in greatest dimension   Tumor extent:  Invades through muscularis propria into subserosa without serosal penetration   Lymphovascular invasion:  Present   Macroscopic tumor perforation:  Not identified   Margin status for invasive carcinoma:  All margins negative for invasive carcinoma   Margin status for dysplasia:  All margins negative for carcinoma in situ (high-grade dysplasia)/ adenoma   Regional lymph node status:  Tumor present in regional lymph nodes   Number of lymph nodes with tumor:  3   Number of lymph nodes examined:  17   PATHOLOGIC STAGE CLASSIFICATION   TNM descriptors:  Not applicable   pT Category:  pT3   pN Category:  pN2   Special studies:  Intact expression of DNA mismatch repair proteins (MLH1, MSH2, MSH6, PMS2) in tumor by immunohistochemistry (performed on prior biopsy KES-, collected 3/20/2023); interpreted by Dr. Garcias)   Comment:  Records from this patient's reported recent colonoscopy are not available for review.       CT a/p w/contrast 4/13/23  Impression:     Postoperative changes from partial small-bowel resection for a small bowel tumor.  Pathology results are pending.     Skin thickening and subcutaneous edema near the umbilicus, which could be inflammatory or infectious.  No organized fluid collections along the surgical incision.     Dilated small bowel loops with gradual tapering distally, likely ileus in the early postoperative setting.     Small volume ascites and trace pneumoperitoneum, likely postoperative.  Anastomotic leak is less likely.  No organized intraperitoneal collections.     Other findings as described.        CT chest w/out contrast  5/1/23   Impression:     Asymmetric thyroid gland with possible left lobe thyroid nodule measuring up to 2.2 cm.  Further evaluation with thyroid ultrasound examination is recommended.      Mild centrilobular emphysematous changes.     Mucous plugging within the left upper lobe laterally and within the right lower lobe laterally.     Cluster of pulmonary nodules within the upper left upper lobe measuring up to 0.6 cm in size.  These are likely infectious/inflammatory in etiology, however continued surveillance is recommended in this patient with history of malignancy.     Hepatic cysts.             Lab Results   Component Value Date    WBC 3.11 (L) 07/07/2023    HGB 11.5 (L) 07/07/2023    HCT 36.3 (L) 07/07/2023    MCV 86 07/07/2023     (L) 07/07/2023             Assessment and Plan     Problem List Items Addressed This Visit          Oncology    Adenocarcinoma of small bowel - Primary  Poli Young is a 67 y.o. male with biopsy proven small bowel adenocarcinoma now status post small bowel resection on 4/4/23.pT3 pN2M0  CT a/p no evidence of distant mets  Adjuvant chemotherapy for 6 mo is recommended for patients with node-positive, completely resected disease. The regimens of choice are CAPOX (capecitabine and oxaliplatin) and FOLFOX (folinic acid, fluorouracil, and oxaliplatin).  Plan mFOLFOX chemotherapy   Proceed with C5  Cbc,cmp,prior to f/u with   on 7/25/23                       Thyroid nodule        US thyroid complete and nodule bx with benign results          Pulmonary nodules        Plan future follow up imaging           PATRICK        Plan IV Fe in future       RTC in 2 weeks for MD visit, labs and next FOLFOX with dr Xenia Francisco MD  [facility]

## 2023-07-13 ENCOUNTER — INFUSION (OUTPATIENT)
Dept: INFUSION THERAPY | Facility: HOSPITAL | Age: 68
End: 2023-07-13
Attending: INTERNAL MEDICINE
Payer: MEDICARE

## 2023-07-13 VITALS
OXYGEN SATURATION: 98 % | DIASTOLIC BLOOD PRESSURE: 61 MMHG | RESPIRATION RATE: 18 BRPM | HEART RATE: 60 BPM | TEMPERATURE: 98 F | SYSTOLIC BLOOD PRESSURE: 124 MMHG

## 2023-07-13 DIAGNOSIS — C17.9 ADENOCARCINOMA OF SMALL BOWEL: Primary | ICD-10-CM

## 2023-07-13 PROCEDURE — 63600175 PHARM REV CODE 636 W HCPCS: Performed by: STUDENT IN AN ORGANIZED HEALTH CARE EDUCATION/TRAINING PROGRAM

## 2023-07-13 RX ORDER — HEPARIN 100 UNIT/ML
500 SYRINGE INTRAVENOUS
Status: DISCONTINUED | OUTPATIENT
Start: 2023-07-13 | End: 2023-07-13 | Stop reason: HOSPADM

## 2023-07-13 RX ORDER — SODIUM CHLORIDE 0.9 % (FLUSH) 0.9 %
10 SYRINGE (ML) INJECTION
Status: DISCONTINUED | OUTPATIENT
Start: 2023-07-13 | End: 2023-07-13 | Stop reason: HOSPADM

## 2023-07-13 RX ADMIN — HEPARIN 500 UNITS: 100 SYRINGE at 09:07

## 2023-07-13 NOTE — PLAN OF CARE
Pt arrived to unit, ambulatory, for pump d/c and port flush. Pt alert and oriented with no complaints at this time. Tolerated pump d/c and flush with no problems. Discharged home.

## 2023-07-25 ENCOUNTER — OFFICE VISIT (OUTPATIENT)
Dept: HEMATOLOGY/ONCOLOGY | Facility: CLINIC | Age: 68
End: 2023-07-25
Payer: MEDICARE

## 2023-07-25 ENCOUNTER — INFUSION (OUTPATIENT)
Dept: INFUSION THERAPY | Facility: HOSPITAL | Age: 68
End: 2023-07-25
Attending: INTERNAL MEDICINE
Payer: MEDICARE

## 2023-07-25 VITALS
RESPIRATION RATE: 18 BRPM | TEMPERATURE: 98 F | HEART RATE: 62 BPM | DIASTOLIC BLOOD PRESSURE: 71 MMHG | SYSTOLIC BLOOD PRESSURE: 124 MMHG | OXYGEN SATURATION: 99 %

## 2023-07-25 VITALS
SYSTOLIC BLOOD PRESSURE: 147 MMHG | BODY MASS INDEX: 26.61 KG/M2 | HEART RATE: 84 BPM | DIASTOLIC BLOOD PRESSURE: 83 MMHG | HEIGHT: 72 IN | WEIGHT: 196.44 LBS | OXYGEN SATURATION: 98 %

## 2023-07-25 DIAGNOSIS — R74.8 ABNORMAL TRANSAMINASES: ICD-10-CM

## 2023-07-25 DIAGNOSIS — G62.0 CHEMOTHERAPY-INDUCED NEUROPATHY: ICD-10-CM

## 2023-07-25 DIAGNOSIS — T45.1X5A ANTINEOPLASTIC CHEMOTHERAPY INDUCED ANEMIA: ICD-10-CM

## 2023-07-25 DIAGNOSIS — R91.8 PULMONARY NODULES: ICD-10-CM

## 2023-07-25 DIAGNOSIS — D64.81 ANTINEOPLASTIC CHEMOTHERAPY INDUCED ANEMIA: ICD-10-CM

## 2023-07-25 DIAGNOSIS — C17.9 ADENOCARCINOMA OF SMALL BOWEL: Primary | ICD-10-CM

## 2023-07-25 DIAGNOSIS — E04.1 THYROID NODULE: ICD-10-CM

## 2023-07-25 DIAGNOSIS — T45.1X5A CHEMOTHERAPY-INDUCED NEUROPATHY: ICD-10-CM

## 2023-07-25 DIAGNOSIS — D70.1 CHEMOTHERAPY INDUCED NEUTROPENIA: ICD-10-CM

## 2023-07-25 DIAGNOSIS — D84.821 IMMUNODEFICIENCY SECONDARY TO CHEMOTHERAPY: ICD-10-CM

## 2023-07-25 DIAGNOSIS — T45.1X5A CHEMOTHERAPY INDUCED NEUTROPENIA: ICD-10-CM

## 2023-07-25 DIAGNOSIS — R53.0 NEOPLASTIC (MALIGNANT) RELATED FATIGUE: ICD-10-CM

## 2023-07-25 DIAGNOSIS — T45.1X5A IMMUNODEFICIENCY SECONDARY TO CHEMOTHERAPY: ICD-10-CM

## 2023-07-25 DIAGNOSIS — Z79.899 IMMUNODEFICIENCY SECONDARY TO CHEMOTHERAPY: ICD-10-CM

## 2023-07-25 PROCEDURE — 1101F PR PT FALLS ASSESS DOC 0-1 FALLS W/OUT INJ PAST YR: ICD-10-PCS | Mod: CPTII,S$GLB,, | Performed by: INTERNAL MEDICINE

## 2023-07-25 PROCEDURE — 96411 CHEMO IV PUSH ADDL DRUG: CPT

## 2023-07-25 PROCEDURE — 3077F PR MOST RECENT SYSTOLIC BLOOD PRESSURE >= 140 MM HG: ICD-10-PCS | Mod: CPTII,S$GLB,, | Performed by: INTERNAL MEDICINE

## 2023-07-25 PROCEDURE — 99999 PR PBB SHADOW E&M-EST. PATIENT-LVL III: ICD-10-PCS | Mod: PBBFAC,,, | Performed by: INTERNAL MEDICINE

## 2023-07-25 PROCEDURE — 1159F PR MEDICATION LIST DOCUMENTED IN MEDICAL RECORD: ICD-10-PCS | Mod: CPTII,S$GLB,, | Performed by: INTERNAL MEDICINE

## 2023-07-25 PROCEDURE — 3008F BODY MASS INDEX DOCD: CPT | Mod: CPTII,S$GLB,, | Performed by: INTERNAL MEDICINE

## 2023-07-25 PROCEDURE — 1126F AMNT PAIN NOTED NONE PRSNT: CPT | Mod: CPTII,S$GLB,, | Performed by: INTERNAL MEDICINE

## 2023-07-25 PROCEDURE — 3079F DIAST BP 80-89 MM HG: CPT | Mod: CPTII,S$GLB,, | Performed by: INTERNAL MEDICINE

## 2023-07-25 PROCEDURE — 96367 TX/PROPH/DG ADDL SEQ IV INF: CPT

## 2023-07-25 PROCEDURE — 96413 CHEMO IV INFUSION 1 HR: CPT

## 2023-07-25 PROCEDURE — 96416 CHEMO PROLONG INFUSE W/PUMP: CPT

## 2023-07-25 PROCEDURE — 1101F PT FALLS ASSESS-DOCD LE1/YR: CPT | Mod: CPTII,S$GLB,, | Performed by: INTERNAL MEDICINE

## 2023-07-25 PROCEDURE — 4010F ACE/ARB THERAPY RXD/TAKEN: CPT | Mod: CPTII,S$GLB,, | Performed by: INTERNAL MEDICINE

## 2023-07-25 PROCEDURE — 25000003 PHARM REV CODE 250: Performed by: INTERNAL MEDICINE

## 2023-07-25 PROCEDURE — 1126F PR PAIN SEVERITY QUANTIFIED, NO PAIN PRESENT: ICD-10-PCS | Mod: CPTII,S$GLB,, | Performed by: INTERNAL MEDICINE

## 2023-07-25 PROCEDURE — 99215 PR OFFICE/OUTPT VISIT, EST, LEVL V, 40-54 MIN: ICD-10-PCS | Mod: S$GLB,,, | Performed by: INTERNAL MEDICINE

## 2023-07-25 PROCEDURE — 3008F PR BODY MASS INDEX (BMI) DOCUMENTED: ICD-10-PCS | Mod: CPTII,S$GLB,, | Performed by: INTERNAL MEDICINE

## 2023-07-25 PROCEDURE — 3077F SYST BP >= 140 MM HG: CPT | Mod: CPTII,S$GLB,, | Performed by: INTERNAL MEDICINE

## 2023-07-25 PROCEDURE — 4010F PR ACE/ARB THEARPY RXD/TAKEN: ICD-10-PCS | Mod: CPTII,S$GLB,, | Performed by: INTERNAL MEDICINE

## 2023-07-25 PROCEDURE — 99215 OFFICE O/P EST HI 40 MIN: CPT | Mod: S$GLB,,, | Performed by: INTERNAL MEDICINE

## 2023-07-25 PROCEDURE — 96415 CHEMO IV INFUSION ADDL HR: CPT

## 2023-07-25 PROCEDURE — 3288F FALL RISK ASSESSMENT DOCD: CPT | Mod: CPTII,S$GLB,, | Performed by: INTERNAL MEDICINE

## 2023-07-25 PROCEDURE — 63600175 PHARM REV CODE 636 W HCPCS: Performed by: INTERNAL MEDICINE

## 2023-07-25 PROCEDURE — 99999 PR PBB SHADOW E&M-EST. PATIENT-LVL III: CPT | Mod: PBBFAC,,, | Performed by: INTERNAL MEDICINE

## 2023-07-25 PROCEDURE — 3288F PR FALLS RISK ASSESSMENT DOCUMENTED: ICD-10-PCS | Mod: CPTII,S$GLB,, | Performed by: INTERNAL MEDICINE

## 2023-07-25 PROCEDURE — 1159F MED LIST DOCD IN RCRD: CPT | Mod: CPTII,S$GLB,, | Performed by: INTERNAL MEDICINE

## 2023-07-25 PROCEDURE — 3079F PR MOST RECENT DIASTOLIC BLOOD PRESSURE 80-89 MM HG: ICD-10-PCS | Mod: CPTII,S$GLB,, | Performed by: INTERNAL MEDICINE

## 2023-07-25 PROCEDURE — 96368 THER/DIAG CONCURRENT INF: CPT

## 2023-07-25 RX ORDER — DIPHENHYDRAMINE HYDROCHLORIDE 50 MG/ML
50 INJECTION INTRAMUSCULAR; INTRAVENOUS ONCE AS NEEDED
Status: CANCELLED | OUTPATIENT
Start: 2023-07-25

## 2023-07-25 RX ORDER — FLUOROURACIL 50 MG/ML
400 INJECTION, SOLUTION INTRAVENOUS
Status: CANCELLED | OUTPATIENT
Start: 2023-07-25

## 2023-07-25 RX ORDER — SODIUM CHLORIDE 0.9 % (FLUSH) 0.9 %
10 SYRINGE (ML) INJECTION
Status: CANCELLED | OUTPATIENT
Start: 2023-07-27

## 2023-07-25 RX ORDER — FLUOROURACIL 50 MG/ML
400 INJECTION, SOLUTION INTRAVENOUS
Status: COMPLETED | OUTPATIENT
Start: 2023-07-25 | End: 2023-07-25

## 2023-07-25 RX ORDER — SODIUM CHLORIDE 0.9 % (FLUSH) 0.9 %
10 SYRINGE (ML) INJECTION
Status: CANCELLED | OUTPATIENT
Start: 2023-07-25

## 2023-07-25 RX ORDER — HEPARIN 100 UNIT/ML
500 SYRINGE INTRAVENOUS
Status: CANCELLED | OUTPATIENT
Start: 2023-07-27

## 2023-07-25 RX ORDER — EPINEPHRINE 0.3 MG/.3ML
0.3 INJECTION SUBCUTANEOUS ONCE AS NEEDED
Status: CANCELLED | OUTPATIENT
Start: 2023-07-25

## 2023-07-25 RX ORDER — FLUOROURACIL 50 MG/ML
2400 INJECTION, SOLUTION INTRAVENOUS
Status: CANCELLED | OUTPATIENT
Start: 2023-07-25

## 2023-07-25 RX ORDER — HEPARIN 100 UNIT/ML
500 SYRINGE INTRAVENOUS
Status: CANCELLED | OUTPATIENT
Start: 2023-07-25

## 2023-07-25 RX ADMIN — LEUCOVORIN CALCIUM 830 MG: 500 INJECTION, POWDER, LYOPHILIZED, FOR SOLUTION INTRAMUSCULAR; INTRAVENOUS at 11:07

## 2023-07-25 RX ADMIN — FLUOROURACIL 830 MG: 50 INJECTION, SOLUTION INTRAVENOUS at 01:07

## 2023-07-25 RX ADMIN — DEXAMETHASONE SODIUM PHOSPHATE 0.25 MG: 10 INJECTION, SOLUTION INTRAMUSCULAR; INTRAVENOUS at 09:07

## 2023-07-25 RX ADMIN — OXALIPLATIN 177 MG: 5 INJECTION, SOLUTION INTRAVENOUS at 11:07

## 2023-07-25 RX ADMIN — FLUOROURACIL 4990 MG: 50 INJECTION, SOLUTION INTRAVENOUS at 01:07

## 2023-07-25 NOTE — PLAN OF CARE
Patient presented to unit for C6 FOLFOX chemo infusion. VSS. Patient seen in clinic by Dr. Xenia alexandre. Labs reviewed along with treatment plan and ok to proceed. Patient will have Neulasta injection added to tx plan on day 3 of chemo. Pre-medication of Aloxi/Dex administered IVPB. Eloxatin and Leucovorin administered concurrently over 2 hours. Fluorouracil administered IVP over 5 mins. Ambulatory chemo pump connected at 1340. No new or worsening symptoms reported during visit. Infusion tolerated well. AVS reviewed with patient. Patient verbalized understanding. Discharged from unit in NAD.

## 2023-07-25 NOTE — Clinical Note
Chemo today with Neulasta Cbc,cmp, mag , hepatits tests prior to chemo in 2wks Cbc,cmp,mag  prior to f/u in 4 wks

## 2023-07-25 NOTE — PROGRESS NOTES
Subjective     Patient ID: Poli Young is a 67 y.o. male.    Chief Complaint: No chief complaint on file.  Diagnosis: Small bowel adenocarcinoma .pT3 pN2cM0  Therapy:status post small bowel resection on 4/4/23                Adjuvant mFOLFOX 5/16/23-   HPI  67M underwent work up for anemia and was found to have a small bowel mass about 100cm from IC valve by enteroscopy by Dr Cruz. Got one blood transfusion. He reports history of left central intermittent abdominal pain for several months. No NV. Was found to be anemic on bloodwork. EGD in June 2022 did not show any obvious cause of bleeding.  Capsule endoscopy in January showed blood in small bowel. Enteroscopy showed mass in March that was biopsied and tattooed. Biopsy positive for adenocarcinoma. He is status post small bowel resection on 4/4/23. Pathology positive for  Invasive adenocarcinoma with partial mucinous component (40% of tumor) Lymphovascular invasion is present 17 lymph nodes, 3 positive for metastatic carcinoma (3/17) .Resection margins free of tumor  Pathologic stage pT3 pN Category:  pN2 . Today , he is doing well. Denies dizziness, weakness. No CP, SOB.Appetite improving postop.CT a/p w/contrast 4/13/23 shows Postoperative changes from partial small-bowel resection for a small bowel tumor . No evidence of distant metastases.      Interval Hx: Doing well  S/p C5 completed 7/13/23  No N/V  Mild fatigue  Minor intermittent tingling/numbness in feet  No SOB/CP      Sochx: Retired . Never smoked. Drinks etoh occasionally.   .       Past Medical History:   Diagnosis Date    Hyperlipemia     Hypertension        Past Surgical History:   Procedure Laterality Date    DIAGNOSTIC LAPAROSCOPY N/A 4/4/2023    Procedure: LAPAROSCOPY, DIAGNOSTIC;  Surgeon: Tyrone Martínez MD;  Location: [facility] OR;  Service: General;  Laterality: N/A;    ESOPHAGOGASTRODUODENOSCOPY N/A 6/8/2022    Procedure: EGD (ESOPHAGOGASTRODUODENOSCOPY);  Surgeon: Aamir  ALFONSO Rushing MD;  Location: [facility] ENDO;  Service: Endoscopy;  Laterality: N/A;    EXCISION, SMALL INTESTINE N/A 4/4/2023    Procedure: EXCISION, SMALL INTESTINE/SMALL BOWEL RESECTION;  Surgeon: Tyrone Martínez MD;  Location: [facility] OR;  Service: General;  Laterality: N/A;    INSERTION OF VENOUS ACCESS PORT Right 5/2/2023    Procedure: INSERTION, VENOUS ACCESS PORT;  Surgeon: Tyrone Martínez MD;  Location: [facility] OR;  Service: General;  Laterality: Right;    INTRALUMINAL GASTROINTESTINAL TRACT IMAGING VIA CAPSULE N/A 1/20/2023    Procedure: IMAGING PROCEDURE, GI TRACT, INTRALUMINAL, VIA CAPSULE;  Surgeon: Aamir Rushing MD;  Location: [facility] ENDO;  Service: Endoscopy;  Laterality: N/A;    LAPAROTOMY, EXPLORATORY N/A 4/4/2023    Procedure: LAPAROTOMY, EXPLORATORY;  Surgeon: Tyrone Martínez MD;  Location: [facility] OR;  Service: General;  Laterality: N/A;    SMALL BOWEL ENTEROSCOPY N/A 3/20/2023    Procedure: ENTEROSCOPY Upper DBE;  Surgeon: Giancarlo Cruz MD;  Location: [facility] ENDO;  Service: Endoscopy;  Laterality: N/A;        Social History     Tobacco Use    Smoking status: Never    Smokeless tobacco: Never   Substance Use Topics    Alcohol use: Yes     Comment: socially    Drug use: Never        Review of Systems   Constitutional:  Positive for fatigue. Negative for appetite change, fever and unexpected weight change.   HENT:  Negative for mouth sores.    Eyes:  Negative for visual disturbance.   Respiratory:  Negative for cough and shortness of breath.    Cardiovascular:  Negative for chest pain.   Gastrointestinal:  Negative for abdominal pain and diarrhea.   Genitourinary:  Negative for frequency.   Musculoskeletal:  Negative for back pain.   Integumentary:  Negative for rash.   Neurological:  Positive for numbness. Negative for headaches.   Hematological:  Negative for adenopathy.   Psychiatric/Behavioral:  The patient is not nervous/anxious.         Objective       Vitals:    07/25/23 0806   BP: (!) 147/83    BP Location: Left arm   Patient Position: Sitting   BP Method: Large (Automatic)   Pulse: 84   SpO2: 98%   Weight: 89.1 kg (196 lb 6.9 oz)   Height: 6' (1.829 m)           Physical Exam  Constitutional:       Appearance: He is well-developed.   HENT:      Head: Normocephalic.      Mouth/Throat:      Pharynx: No oropharyngeal exudate.   Eyes:      General: No scleral icterus.        Right eye: No discharge.         Left eye: No discharge.      Conjunctiva/sclera: Conjunctivae normal.   Neck:      Thyroid: No thyromegaly.   Cardiovascular:      Rate and Rhythm: Normal rate and regular rhythm.      Heart sounds: Normal heart sounds. No murmur heard.  Pulmonary:      Effort: Pulmonary effort is normal.      Breath sounds: Normal breath sounds. No wheezing or rales.   Abdominal:      General: Bowel sounds are normal.      Palpations: Abdomen is soft.      Tenderness: There is no abdominal tenderness. There is no guarding or rebound.   Musculoskeletal:         General: No swelling. Normal range of motion.      Cervical back: Normal range of motion and neck supple.   Lymphadenopathy:      Cervical: No cervical adenopathy.      Upper Body:      Right upper body: No supraclavicular adenopathy.      Left upper body: No supraclavicular adenopathy.   Skin:     Findings: No erythema or rash.   Neurological:      Mental Status: He is alert and oriented to person, place, and time.      Cranial Nerves: No cranial nerve deficit.   Psychiatric:         Mood and Affect: Mood normal.         Behavior: Behavior normal.            Pathology 4/4/23  Small intestine, segmental resection:   - Invasive adenocarcinoma with partial mucinous component (40% of tumor)       - Tumor invades through muscularis propria into subserosa   - Lymphovascular invasion is present   - 17 lymph nodes, 3 positive for metastatic carcinoma (3/17)   - Resection margins free of tumor   - Background small intestinal mucosa without significant histopathologic  alteration   - See CAP Tumor synoptic and comment     CAP Tumor Synoptic:   Procedure:  Segmental resection   Tumor site:  Small intestine, not otherwise specified   Histologic type:  Adenocarcinoma (not otherwise characterized), with mucinous component comprising approximately 40% of tumor   Histologic grade:  G2, moderately differentiated   Tumor size:  5.5 cm in greatest dimension   Tumor extent:  Invades through muscularis propria into subserosa without serosal penetration   Lymphovascular invasion:  Present   Macroscopic tumor perforation:  Not identified   Margin status for invasive carcinoma:  All margins negative for invasive carcinoma   Margin status for dysplasia:  All margins negative for carcinoma in situ (high-grade dysplasia)/ adenoma   Regional lymph node status:  Tumor present in regional lymph nodes   Number of lymph nodes with tumor:  3   Number of lymph nodes examined:  17   PATHOLOGIC STAGE CLASSIFICATION   TNM descriptors:  Not applicable   pT Category:  pT3   pN Category:  pN2   Special studies:  Intact expression of DNA mismatch repair proteins (MLH1, MSH2, MSH6, PMS2) in tumor by immunohistochemistry (performed on prior biopsy KES-, collected 3/20/2023); interpreted by Dr. Garcias)   Comment:  Records from this patient's reported recent colonoscopy are not available for review.       CT a/p w/contrast 4/13/23  Impression:     Postoperative changes from partial small-bowel resection for a small bowel tumor.  Pathology results are pending.     Skin thickening and subcutaneous edema near the umbilicus, which could be inflammatory or infectious.  No organized fluid collections along the surgical incision.     Dilated small bowel loops with gradual tapering distally, likely ileus in the early postoperative setting.     Small volume ascites and trace pneumoperitoneum, likely postoperative.  Anastomotic leak is less likely.  No organized intraperitoneal collections.     Other findings as  described.        CT chest w/out contrast  5/1/23   Impression:     Asymmetric thyroid gland with possible left lobe thyroid nodule measuring up to 2.2 cm.  Further evaluation with thyroid ultrasound examination is recommended.     Mild centrilobular emphysematous changes.     Mucous plugging within the left upper lobe laterally and within the right lower lobe laterally.     Cluster of pulmonary nodules within the upper left upper lobe measuring up to 0.6 cm in size.  These are likely infectious/inflammatory in etiology, however continued surveillance is recommended in this patient with history of malignancy.     Hepatic cysts.         CT c/a/p 7/3/2023   Impression:     1. Postsurgical changes compatible with partial small bowel resection with enteroenteric anastomosis and surgical sutures in the region left upper quadrant, not significantly changed.  2. Near complete interval resolution of previously noted cluster of pulmonary nodules in the left upper lobe.  Cluster of approximately 3 ground-glass pulmonary micro nodules in the left upper lobe and, second cluster of approximately 3 ground-glass pulmonary micro nodules in the right lower lobe, not significantly changed.  A few borderline enlarged mediastinal lymph nodes, not significantly changed.  3. Multiple simple appearing hepatic parenchymal cyst and multiple additional subcentimeter hypodensities that are too small to accurately characterize, not significantly changed.  4. 0.4-cm parenchymal hypodensity in the pancreatic head, not significantly changed.  5. Simple appearing renal cortical cystic lesion in the lateral aspect of the right midpole, not significantly changed. Multiple additional subcentimeter renal cortical hypodensities bilaterally are too small to accurately characterize but not significantly changed when compared to prior.        Lab Results   Component Value Date    WBC 2.60 (L) 07/24/2023    HGB 12.2 (L) 07/24/2023    HCT 36.9 (L)  07/24/2023    MCV 87 07/24/2023     (L) 07/24/2023             Assessment and Plan     Problem List Items Addressed This Visit           Diagnoses and all orders for this visit:    Adenocarcinoma of small bowel  Poli Young is a 67 y.o. male with biopsy proven small bowel adenocarcinoma now status post small bowel resection on 4/4/23.pT3 pN2M0  CT a/p no evidence of distant mets  Adjuvant chemotherapy for 6 mo is recommended for patients with node-positive, completely resected disease. The regimens of choice are CAPOX (capecitabine and oxaliplatin) and FOLFOX (folinic acid, fluorouracil, and oxaliplatin).  Plan mFOLFOX chemotherapy   CT imaging 7/3/2023 reviewed   Proceed with C6  Cbc,cmp,prior to f/u 1mo  Immunodeficiency secondary to chemotherapy    Abnormal transaminases  -     HEPATITIS PANEL, ACUTE; Future  -     HEPATITIS B CORE ANTIBODY, TOTAL; Future    Thyroid nodule  US thyroid complete and nodule bx with benign results  Pulmonary nodules  Follow up CT imaging Near complete interval resolution of previously noted cluster of pulmonary nodules in the left upper lobe.  Cluster of approximately 3 ground-glass pulmonary micro nodules in the left upper lobe and, second cluster of approximately 3 ground-glass pulmonary micro nodules in the right lower lobe, not significantly changed.  A few borderline enlarged mediastinal lymph nodes, not significantly changed.  Chemotherapy-induced neuropathy  Minor  Monitor    Neoplastic (malignant) related fatigue  -     HEPATITIS PANEL, ACUTE; Future     Chemotherapy induced neutropenia   ANC 1100   Afebrile   Plan growth factor support post chemo     Antineoplastic chemotherapy induced anemia  Hb 12.2g/dl  Monitor        Chemo today with Neulasta  Cbc,cmp, mag , hepatits tests prior to chemo in 2wks  Cbc,cmp,mag  prior to f/u in 4 wks          Amparo Michaels MD  Heme/Onc [facility]

## 2023-07-27 ENCOUNTER — INFUSION (OUTPATIENT)
Dept: INFUSION THERAPY | Facility: HOSPITAL | Age: 68
End: 2023-07-27
Attending: INTERNAL MEDICINE
Payer: MEDICARE

## 2023-07-27 VITALS
SYSTOLIC BLOOD PRESSURE: 116 MMHG | HEART RATE: 54 BPM | DIASTOLIC BLOOD PRESSURE: 67 MMHG | OXYGEN SATURATION: 97 % | RESPIRATION RATE: 18 BRPM

## 2023-07-27 DIAGNOSIS — C17.9 ADENOCARCINOMA OF SMALL BOWEL: Primary | ICD-10-CM

## 2023-07-27 PROCEDURE — 25000003 PHARM REV CODE 250: Performed by: INTERNAL MEDICINE

## 2023-07-27 PROCEDURE — 96377 APPLICATON ON-BODY INJECTOR: CPT

## 2023-07-27 PROCEDURE — 63600175 PHARM REV CODE 636 W HCPCS: Mod: JZ,JG | Performed by: INTERNAL MEDICINE

## 2023-07-27 PROCEDURE — A4216 STERILE WATER/SALINE, 10 ML: HCPCS | Performed by: INTERNAL MEDICINE

## 2023-07-27 RX ORDER — SODIUM CHLORIDE 0.9 % (FLUSH) 0.9 %
10 SYRINGE (ML) INJECTION
Status: DISCONTINUED | OUTPATIENT
Start: 2023-07-27 | End: 2023-07-27 | Stop reason: HOSPADM

## 2023-07-27 RX ORDER — HEPARIN 100 UNIT/ML
500 SYRINGE INTRAVENOUS
Status: DISCONTINUED | OUTPATIENT
Start: 2023-07-27 | End: 2023-07-27 | Stop reason: HOSPADM

## 2023-07-27 RX ADMIN — HEPARIN 500 UNITS: 100 SYRINGE at 12:07

## 2023-07-27 RX ADMIN — Medication 10 ML: at 12:07

## 2023-07-27 RX ADMIN — PEGFILGRASTIM 6 MG: KIT SUBCUTANEOUS at 12:07

## 2023-07-27 NOTE — PLAN OF CARE
Patient presented to unit for pump d/c. Chemo infused w/o difficulty. Neulasta ON-BODY placed on patient's left arm. On-body neulasta blinking green prior to discharge. Discharged from unit in NAD.

## 2023-07-31 ENCOUNTER — INFUSION (OUTPATIENT)
Dept: INFUSION THERAPY | Facility: HOSPITAL | Age: 68
End: 2023-07-31
Attending: INTERNAL MEDICINE
Payer: MEDICARE

## 2023-07-31 VITALS
DIASTOLIC BLOOD PRESSURE: 62 MMHG | RESPIRATION RATE: 14 BRPM | OXYGEN SATURATION: 97 % | SYSTOLIC BLOOD PRESSURE: 111 MMHG | HEART RATE: 79 BPM | TEMPERATURE: 98 F

## 2023-07-31 DIAGNOSIS — C17.9 ADENOCARCINOMA OF SMALL BOWEL: Primary | ICD-10-CM

## 2023-07-31 PROCEDURE — 96372 THER/PROPH/DIAG INJ SC/IM: CPT

## 2023-07-31 PROCEDURE — 63600175 PHARM REV CODE 636 W HCPCS: Mod: JZ,JG | Performed by: INTERNAL MEDICINE

## 2023-07-31 RX ADMIN — PEGFILGRASTIM 6 MG: 6 INJECTION SUBCUTANEOUS at 12:07

## 2023-07-31 NOTE — PLAN OF CARE
Patient presented to unit for Neulasta injection. Patient had on-body neulasta placed 7/27/2023. Patient reports Neulasta onbody malfunctioned following him taking a bath and sleeping on arm with placed Neulasta. Neulasta was not received per patient's reports. Neulasta injection administered today. Injection tolerated well. Discharged from unit in NAD.

## 2023-08-14 ENCOUNTER — INFUSION (OUTPATIENT)
Dept: INFUSION THERAPY | Facility: HOSPITAL | Age: 68
End: 2023-08-14
Attending: INTERNAL MEDICINE
Payer: MEDICARE

## 2023-08-14 VITALS
DIASTOLIC BLOOD PRESSURE: 67 MMHG | WEIGHT: 199 LBS | SYSTOLIC BLOOD PRESSURE: 119 MMHG | OXYGEN SATURATION: 96 % | HEIGHT: 72 IN | BODY MASS INDEX: 26.95 KG/M2 | RESPIRATION RATE: 18 BRPM | TEMPERATURE: 98 F | HEART RATE: 70 BPM

## 2023-08-14 DIAGNOSIS — C17.9 ADENOCARCINOMA OF SMALL BOWEL: Primary | ICD-10-CM

## 2023-08-14 LAB
BASOPHILS # BLD AUTO: 0.02 K/UL (ref 0–0.2)
BASOPHILS NFR BLD: 0.4 % (ref 0–1.9)
DIFFERENTIAL METHOD: ABNORMAL
EOSINOPHIL # BLD AUTO: 0.1 K/UL (ref 0–0.5)
EOSINOPHIL NFR BLD: 1.4 % (ref 0–8)
ERYTHROCYTE [DISTWIDTH] IN BLOOD BY AUTOMATED COUNT: 22.7 % (ref 11.5–14.5)
HCT VFR BLD AUTO: 34.9 % (ref 40–54)
HGB BLD-MCNC: 11.7 G/DL (ref 14–18)
IMM GRANULOCYTES # BLD AUTO: 0.1 K/UL (ref 0–0.04)
IMM GRANULOCYTES NFR BLD AUTO: 2 % (ref 0–0.5)
LYMPHOCYTES # BLD AUTO: 1 K/UL (ref 1–4.8)
LYMPHOCYTES NFR BLD: 20.7 % (ref 18–48)
MCH RBC QN AUTO: 29.4 PG (ref 27–31)
MCHC RBC AUTO-ENTMCNC: 33.5 G/DL (ref 32–36)
MCV RBC AUTO: 88 FL (ref 82–98)
MONOCYTES # BLD AUTO: 0.8 K/UL (ref 0.3–1)
MONOCYTES NFR BLD: 16.3 % (ref 4–15)
NEUTROPHILS # BLD AUTO: 3 K/UL (ref 1.8–7.7)
NEUTROPHILS NFR BLD: 59.2 % (ref 38–73)
NRBC BLD-RTO: 1 /100 WBC
PLATELET # BLD AUTO: 96 K/UL (ref 150–450)
PMV BLD AUTO: 8.7 FL (ref 9.2–12.9)
RBC # BLD AUTO: 3.98 M/UL (ref 4.6–6.2)
WBC # BLD AUTO: 5.02 K/UL (ref 3.9–12.7)

## 2023-08-14 PROCEDURE — 63600175 PHARM REV CODE 636 W HCPCS: Performed by: INTERNAL MEDICINE

## 2023-08-14 PROCEDURE — 96367 TX/PROPH/DG ADDL SEQ IV INF: CPT

## 2023-08-14 PROCEDURE — 96416 CHEMO PROLONG INFUSE W/PUMP: CPT

## 2023-08-14 PROCEDURE — 96413 CHEMO IV INFUSION 1 HR: CPT

## 2023-08-14 PROCEDURE — 96368 THER/DIAG CONCURRENT INF: CPT

## 2023-08-14 PROCEDURE — 25000003 PHARM REV CODE 250: Performed by: INTERNAL MEDICINE

## 2023-08-14 PROCEDURE — 96411 CHEMO IV PUSH ADDL DRUG: CPT

## 2023-08-14 PROCEDURE — 96415 CHEMO IV INFUSION ADDL HR: CPT

## 2023-08-14 PROCEDURE — 85025 COMPLETE CBC W/AUTO DIFF WBC: CPT | Performed by: INTERNAL MEDICINE

## 2023-08-14 RX ORDER — FLUOROURACIL 50 MG/ML
400 INJECTION, SOLUTION INTRAVENOUS
Status: COMPLETED | OUTPATIENT
Start: 2023-08-14 | End: 2023-08-14

## 2023-08-14 RX ORDER — HEPARIN 100 UNIT/ML
500 SYRINGE INTRAVENOUS
Status: CANCELLED | OUTPATIENT
Start: 2023-08-16

## 2023-08-14 RX ORDER — FLUOROURACIL 50 MG/ML
400 INJECTION, SOLUTION INTRAVENOUS
Status: CANCELLED | OUTPATIENT
Start: 2023-08-14

## 2023-08-14 RX ORDER — FLUOROURACIL 50 MG/ML
2400 INJECTION, SOLUTION INTRAVENOUS
Status: CANCELLED | OUTPATIENT
Start: 2023-08-14

## 2023-08-14 RX ORDER — SODIUM CHLORIDE 0.9 % (FLUSH) 0.9 %
10 SYRINGE (ML) INJECTION
Status: CANCELLED | OUTPATIENT
Start: 2023-08-16

## 2023-08-14 RX ORDER — SODIUM CHLORIDE 0.9 % (FLUSH) 0.9 %
10 SYRINGE (ML) INJECTION
Status: CANCELLED | OUTPATIENT
Start: 2023-08-14

## 2023-08-14 RX ORDER — EPINEPHRINE 0.3 MG/.3ML
0.3 INJECTION SUBCUTANEOUS ONCE AS NEEDED
Status: CANCELLED | OUTPATIENT
Start: 2023-08-14

## 2023-08-14 RX ORDER — DIPHENHYDRAMINE HYDROCHLORIDE 50 MG/ML
50 INJECTION INTRAMUSCULAR; INTRAVENOUS ONCE AS NEEDED
Status: CANCELLED | OUTPATIENT
Start: 2023-08-14

## 2023-08-14 RX ORDER — HEPARIN 100 UNIT/ML
500 SYRINGE INTRAVENOUS
Status: CANCELLED | OUTPATIENT
Start: 2023-08-14

## 2023-08-14 RX ADMIN — LEUCOVORIN CALCIUM 855 MG: 350 INJECTION, POWDER, LYOPHILIZED, FOR SOLUTION INTRAMUSCULAR; INTRAVENOUS at 12:08

## 2023-08-14 RX ADMIN — FLUOROURACIL 855 MG: 50 INJECTION, SOLUTION INTRAVENOUS at 02:08

## 2023-08-14 RX ADMIN — OXALIPLATIN 182 MG: 5 INJECTION, SOLUTION INTRAVENOUS at 12:08

## 2023-08-14 RX ADMIN — FLUOROURACIL 5000 MG: 50 INJECTION, SOLUTION INTRAVENOUS at 02:08

## 2023-08-14 RX ADMIN — DEXAMETHASONE SODIUM PHOSPHATE 0.25 MG: 10 INJECTION, SOLUTION INTRAMUSCULAR; INTRAVENOUS at 11:08

## 2023-08-16 ENCOUNTER — INFUSION (OUTPATIENT)
Dept: INFUSION THERAPY | Facility: HOSPITAL | Age: 68
End: 2023-08-16
Attending: INTERNAL MEDICINE
Payer: MEDICARE

## 2023-08-16 DIAGNOSIS — C17.9 ADENOCARCINOMA OF SMALL BOWEL: Primary | ICD-10-CM

## 2023-08-16 PROCEDURE — 96377 APPLICATON ON-BODY INJECTOR: CPT

## 2023-08-16 PROCEDURE — 63600175 PHARM REV CODE 636 W HCPCS: Performed by: INTERNAL MEDICINE

## 2023-08-16 RX ORDER — HEPARIN 100 UNIT/ML
500 SYRINGE INTRAVENOUS
Status: DISCONTINUED | OUTPATIENT
Start: 2023-08-16 | End: 2023-08-16 | Stop reason: HOSPADM

## 2023-08-16 RX ADMIN — HEPARIN 500 UNITS: 100 SYRINGE at 12:08

## 2023-08-16 RX ADMIN — PEGFILGRASTIM 6 MG: KIT SUBCUTANEOUS at 12:08

## 2023-08-16 NOTE — PLAN OF CARE
Pt arrived to unit, ambulatory, for chemo pump d/c and neulasta on body placement. Pt alert and oriented with no complaints upon arrival. Tolerated pump d/c and neulasta on body placement with no problems. Confirmed neulasta is functional - discharged home upon completion.

## 2023-08-21 NOTE — PLAN OF CARE
Problem: Coping Ineffective (Oncology Care)  Goal: Effective Coping  Outcome: Met     Problem: Fatigue (Oncology Care)  Goal: Improved Activity Tolerance  Outcome: Met     Problem: Oral Intake Altered (Oncology Care)  Goal: Optimal Oral Intake  Outcome: Met     Problem: Oral Mucositis (Oncology Care)  Goal: Improved Oral Mucous Membrane Integrity  Outcome: Met     Problem: Pain Acute (Oncology Care)  Goal: Optimal Pain Control  Outcome: Met

## 2023-08-21 NOTE — PLAN OF CARE
Patient presented to unit for C7 FOLFOX chemo infusion. Chemo held last week due to patient's low plateles. Patient repeated labs 8/11/23. Plts resulted in 70. CBC drawn during visit and plt resulted in 96. MD Xenia notified. Per Dr. Michaels, patient ok to treat with plts 96. Other labs reviewed and ok to continue with treatment plan today. VSS. Pre-medications of Aloxi/Dex administered IVPB. Oxaliplatin and Leucovorin administered concurrently over 2 hours. Fluorouracil injection administered IVP over 10 mins. Fluorouracil chemo ambulatory pump connected at 1445. No new or worsening symptoms reported. Patient discharged from unit in NAD.

## 2023-08-28 ENCOUNTER — LAB VISIT (OUTPATIENT)
Dept: LAB | Facility: HOSPITAL | Age: 68
End: 2023-08-28
Payer: MEDICARE

## 2023-08-28 ENCOUNTER — OFFICE VISIT (OUTPATIENT)
Dept: HEPATOLOGY | Facility: CLINIC | Age: 68
End: 2023-08-28
Payer: MEDICARE

## 2023-08-28 ENCOUNTER — OFFICE VISIT (OUTPATIENT)
Dept: HEMATOLOGY/ONCOLOGY | Facility: CLINIC | Age: 68
End: 2023-08-28
Payer: MEDICARE

## 2023-08-28 ENCOUNTER — PATIENT MESSAGE (OUTPATIENT)
Dept: HEPATOLOGY | Facility: CLINIC | Age: 68
End: 2023-08-28

## 2023-08-28 VITALS
BODY MASS INDEX: 26.88 KG/M2 | HEIGHT: 72 IN | OXYGEN SATURATION: 97 % | WEIGHT: 198.44 LBS | HEART RATE: 75 BPM | DIASTOLIC BLOOD PRESSURE: 78 MMHG | SYSTOLIC BLOOD PRESSURE: 132 MMHG

## 2023-08-28 VITALS — WEIGHT: 198 LBS | HEIGHT: 72 IN | BODY MASS INDEX: 26.82 KG/M2

## 2023-08-28 DIAGNOSIS — E04.1 THYROID NODULE: ICD-10-CM

## 2023-08-28 DIAGNOSIS — T45.1X5A IMMUNODEFICIENCY SECONDARY TO CHEMOTHERAPY: ICD-10-CM

## 2023-08-28 DIAGNOSIS — R93.2 ABNORMAL FINDINGS ON DIAGNOSTIC IMAGING OF LIVER AND BILIARY TRACT: ICD-10-CM

## 2023-08-28 DIAGNOSIS — C17.9 ADENOCARCINOMA OF SMALL BOWEL: ICD-10-CM

## 2023-08-28 DIAGNOSIS — D69.59 CHEMOTHERAPY-INDUCED THROMBOCYTOPENIA: ICD-10-CM

## 2023-08-28 DIAGNOSIS — R76.8 HEPATITIS B CORE ANTIBODY POSITIVE: ICD-10-CM

## 2023-08-28 DIAGNOSIS — Z79.899 IMMUNODEFICIENCY SECONDARY TO CHEMOTHERAPY: ICD-10-CM

## 2023-08-28 DIAGNOSIS — T45.1X5A CHEMOTHERAPY-INDUCED NEUROPATHY: ICD-10-CM

## 2023-08-28 DIAGNOSIS — G62.0 CHEMOTHERAPY-INDUCED NEUROPATHY: ICD-10-CM

## 2023-08-28 DIAGNOSIS — T45.1X5A CHEMOTHERAPY-INDUCED THROMBOCYTOPENIA: ICD-10-CM

## 2023-08-28 DIAGNOSIS — D84.821 IMMUNODEFICIENCY SECONDARY TO CHEMOTHERAPY: ICD-10-CM

## 2023-08-28 DIAGNOSIS — R76.8 HEPATITIS B CORE ANTIBODY POSITIVE: Primary | ICD-10-CM

## 2023-08-28 DIAGNOSIS — R74.8 ABNORMAL TRANSAMINASES: ICD-10-CM

## 2023-08-28 DIAGNOSIS — R91.8 PULMONARY NODULES: ICD-10-CM

## 2023-08-28 DIAGNOSIS — C17.9 ADENOCARCINOMA OF SMALL BOWEL: Primary | ICD-10-CM

## 2023-08-28 PROBLEM — I10 ESSENTIAL HYPERTENSION: Status: ACTIVE | Noted: 2018-12-31

## 2023-08-28 PROBLEM — K76.89 LIVER CYST: Status: ACTIVE | Noted: 2023-02-22

## 2023-08-28 PROBLEM — Q61.02 MULTIPLE RENAL CYSTS: Status: ACTIVE | Noted: 2023-02-22

## 2023-08-28 PROBLEM — K86.2 CYST OF PANCREAS: Status: ACTIVE | Noted: 2023-02-22

## 2023-08-28 PROBLEM — E78.5 HYPERLIPIDEMIA: Status: ACTIVE | Noted: 2018-12-31

## 2023-08-28 LAB — HBV CORE AB SERPL QL IA: REACTIVE

## 2023-08-28 PROCEDURE — 1101F PR PT FALLS ASSESS DOC 0-1 FALLS W/OUT INJ PAST YR: ICD-10-PCS | Mod: CPTII,S$GLB,, | Performed by: NURSE PRACTITIONER

## 2023-08-28 PROCEDURE — 1101F PT FALLS ASSESS-DOCD LE1/YR: CPT | Mod: CPTII,S$GLB,, | Performed by: INTERNAL MEDICINE

## 2023-08-28 PROCEDURE — 86704 HEP B CORE ANTIBODY TOTAL: CPT | Performed by: NURSE PRACTITIONER

## 2023-08-28 PROCEDURE — 3075F SYST BP GE 130 - 139MM HG: CPT | Mod: CPTII,S$GLB,, | Performed by: INTERNAL MEDICINE

## 2023-08-28 PROCEDURE — 4010F PR ACE/ARB THEARPY RXD/TAKEN: ICD-10-PCS | Mod: CPTII,S$GLB,, | Performed by: NURSE PRACTITIONER

## 2023-08-28 PROCEDURE — 99203 PR OFFICE/OUTPT VISIT, NEW, LEVL III, 30-44 MIN: ICD-10-PCS | Mod: S$GLB,,, | Performed by: NURSE PRACTITIONER

## 2023-08-28 PROCEDURE — 99999 PR PBB SHADOW E&M-EST. PATIENT-LVL III: ICD-10-PCS | Mod: PBBFAC,,, | Performed by: INTERNAL MEDICINE

## 2023-08-28 PROCEDURE — 3078F PR MOST RECENT DIASTOLIC BLOOD PRESSURE < 80 MM HG: ICD-10-PCS | Mod: CPTII,S$GLB,, | Performed by: INTERNAL MEDICINE

## 2023-08-28 PROCEDURE — 4010F ACE/ARB THERAPY RXD/TAKEN: CPT | Mod: CPTII,S$GLB,, | Performed by: INTERNAL MEDICINE

## 2023-08-28 PROCEDURE — 3008F PR BODY MASS INDEX (BMI) DOCUMENTED: ICD-10-PCS | Mod: CPTII,S$GLB,, | Performed by: NURSE PRACTITIONER

## 2023-08-28 PROCEDURE — 4010F PR ACE/ARB THEARPY RXD/TAKEN: ICD-10-PCS | Mod: CPTII,S$GLB,, | Performed by: INTERNAL MEDICINE

## 2023-08-28 PROCEDURE — 1160F PR REVIEW ALL MEDS BY PRESCRIBER/CLIN PHARMACIST DOCUMENTED: ICD-10-PCS | Mod: CPTII,S$GLB,, | Performed by: NURSE PRACTITIONER

## 2023-08-28 PROCEDURE — 99999 PR PBB SHADOW E&M-EST. PATIENT-LVL IV: ICD-10-PCS | Mod: PBBFAC,,, | Performed by: NURSE PRACTITIONER

## 2023-08-28 PROCEDURE — 99999 PR PBB SHADOW E&M-EST. PATIENT-LVL III: CPT | Mod: PBBFAC,,, | Performed by: INTERNAL MEDICINE

## 2023-08-28 PROCEDURE — 1126F AMNT PAIN NOTED NONE PRSNT: CPT | Mod: CPTII,S$GLB,, | Performed by: NURSE PRACTITIONER

## 2023-08-28 PROCEDURE — 99203 OFFICE O/P NEW LOW 30 MIN: CPT | Mod: S$GLB,,, | Performed by: NURSE PRACTITIONER

## 2023-08-28 PROCEDURE — 99999 PR PBB SHADOW E&M-EST. PATIENT-LVL IV: CPT | Mod: PBBFAC,,, | Performed by: NURSE PRACTITIONER

## 2023-08-28 PROCEDURE — 4010F ACE/ARB THERAPY RXD/TAKEN: CPT | Mod: CPTII,S$GLB,, | Performed by: NURSE PRACTITIONER

## 2023-08-28 PROCEDURE — 3008F PR BODY MASS INDEX (BMI) DOCUMENTED: ICD-10-PCS | Mod: CPTII,S$GLB,, | Performed by: INTERNAL MEDICINE

## 2023-08-28 PROCEDURE — 3008F BODY MASS INDEX DOCD: CPT | Mod: CPTII,S$GLB,, | Performed by: INTERNAL MEDICINE

## 2023-08-28 PROCEDURE — 3288F PR FALLS RISK ASSESSMENT DOCUMENTED: ICD-10-PCS | Mod: CPTII,S$GLB,, | Performed by: NURSE PRACTITIONER

## 2023-08-28 PROCEDURE — 1159F MED LIST DOCD IN RCRD: CPT | Mod: CPTII,S$GLB,, | Performed by: NURSE PRACTITIONER

## 2023-08-28 PROCEDURE — 3288F FALL RISK ASSESSMENT DOCD: CPT | Mod: CPTII,S$GLB,, | Performed by: INTERNAL MEDICINE

## 2023-08-28 PROCEDURE — 3008F BODY MASS INDEX DOCD: CPT | Mod: CPTII,S$GLB,, | Performed by: NURSE PRACTITIONER

## 2023-08-28 PROCEDURE — 3075F PR MOST RECENT SYSTOLIC BLOOD PRESS GE 130-139MM HG: ICD-10-PCS | Mod: CPTII,S$GLB,, | Performed by: INTERNAL MEDICINE

## 2023-08-28 PROCEDURE — 99215 OFFICE O/P EST HI 40 MIN: CPT | Mod: S$GLB,,, | Performed by: INTERNAL MEDICINE

## 2023-08-28 PROCEDURE — 3078F DIAST BP <80 MM HG: CPT | Mod: CPTII,S$GLB,, | Performed by: INTERNAL MEDICINE

## 2023-08-28 PROCEDURE — 3288F PR FALLS RISK ASSESSMENT DOCUMENTED: ICD-10-PCS | Mod: CPTII,S$GLB,, | Performed by: INTERNAL MEDICINE

## 2023-08-28 PROCEDURE — 1126F AMNT PAIN NOTED NONE PRSNT: CPT | Mod: CPTII,S$GLB,, | Performed by: INTERNAL MEDICINE

## 2023-08-28 PROCEDURE — 36415 COLL VENOUS BLD VENIPUNCTURE: CPT | Performed by: NURSE PRACTITIONER

## 2023-08-28 PROCEDURE — 1126F PR PAIN SEVERITY QUANTIFIED, NO PAIN PRESENT: ICD-10-PCS | Mod: CPTII,S$GLB,, | Performed by: NURSE PRACTITIONER

## 2023-08-28 PROCEDURE — 1101F PR PT FALLS ASSESS DOC 0-1 FALLS W/OUT INJ PAST YR: ICD-10-PCS | Mod: CPTII,S$GLB,, | Performed by: INTERNAL MEDICINE

## 2023-08-28 PROCEDURE — 1101F PT FALLS ASSESS-DOCD LE1/YR: CPT | Mod: CPTII,S$GLB,, | Performed by: NURSE PRACTITIONER

## 2023-08-28 PROCEDURE — 1159F PR MEDICATION LIST DOCUMENTED IN MEDICAL RECORD: ICD-10-PCS | Mod: CPTII,S$GLB,, | Performed by: NURSE PRACTITIONER

## 2023-08-28 PROCEDURE — 99215 PR OFFICE/OUTPT VISIT, EST, LEVL V, 40-54 MIN: ICD-10-PCS | Mod: S$GLB,,, | Performed by: INTERNAL MEDICINE

## 2023-08-28 PROCEDURE — 3288F FALL RISK ASSESSMENT DOCD: CPT | Mod: CPTII,S$GLB,, | Performed by: NURSE PRACTITIONER

## 2023-08-28 PROCEDURE — 1160F RVW MEDS BY RX/DR IN RCRD: CPT | Mod: CPTII,S$GLB,, | Performed by: NURSE PRACTITIONER

## 2023-08-28 PROCEDURE — 1126F PR PAIN SEVERITY QUANTIFIED, NO PAIN PRESENT: ICD-10-PCS | Mod: CPTII,S$GLB,, | Performed by: INTERNAL MEDICINE

## 2023-08-28 PROCEDURE — 87517 HEPATITIS B DNA QUANT: CPT | Performed by: NURSE PRACTITIONER

## 2023-08-28 RX ORDER — GABAPENTIN 300 MG/1
300 CAPSULE ORAL NIGHTLY
Qty: 30 CAPSULE | Refills: 1 | Status: SHIPPED | OUTPATIENT
Start: 2023-08-28 | End: 2023-09-25 | Stop reason: SDUPTHER

## 2023-08-28 RX ORDER — TENOFOVIR ALAFENAMIDE 25 MG/1
25 TABLET ORAL DAILY
Qty: 30 TABLET | Refills: 11 | Status: ACTIVE | OUTPATIENT
Start: 2023-08-28 | End: 2024-02-27 | Stop reason: SDUPTHER

## 2023-08-28 NOTE — Clinical Note
Hi Dr. Michaels: Just an update, per [facility] pharmacy notes, he should have started his Hep B prophylaxis at the end of August.

## 2023-08-28 NOTE — PATIENT INSTRUCTIONS
1. Fibroscan to look for fat or scar tissue in the liver with return to clinic   2. Will check immunity markers for Hepatitis A and B and arrange for vaccination if needed  3. Labs before return to clinic to  check for multiple causes of liver disease. These labs will release to you as soon as they are resulted but we will discuss them in detail at your upcoming visit to discuss what the lab results mean.   4.  Follow up in 3 months with fibroscan same day

## 2023-08-28 NOTE — PROGRESS NOTES
"[facility] VISIT NEW PT NOTE    REFERRING PROVIDER:  Dr. Amparo Michaels  PCP: Rola Feliz FNP     CHIEF COMPLAINT: elevated alk phos, Hep B core antibody positive     HPI: This is a 67 y.o. male with PMH noted below, presenting for evaluation of elevated alkaline phosphatase, Hep B core positive ab x1     Of note: being followed by Dr. Michaels, hem/onc for adenocarcinoma of small bowel    Previous serological workup was negative for viral hepatitis A, B and C  Elevated ferritin, low iron    Prior serologic workup:   Lab Results   Component Value Date    FERRITIN 890 (H) 06/12/2023    FESATURATED 16 (L) 06/12/2023    HEPBSAG Non-reactive 08/04/2023    HEPCAB Non-reactive 08/04/2023    HEPAIGM Non-reactive 08/04/2023         Interval HPI: Presents today with wife. + Hep B core on recent labs, will repeat today  Has been getting chemo for small bowel cancer    Labs done 8/2023 note elevated alk phos since 8/2023  AST and ALT now normal   Platelets low since 6/2023  Synthetic liver function WNL    Lab Results   Component Value Date    ALT 37 08/25/2023    AST 33 08/25/2023    ALKPHOS 170 (H) 08/25/2023    BILITOT 0.4 08/25/2023    ALBUMIN 3.7 08/25/2023    INR 1.0 06/08/2022     (L) 08/25/2023       CT done 7/2023 noted Multiple simple appearing hepatic parenchymal cyst and multiple additional subcentimeter hypodensities that are too small to accurately characterize, not significantly change.  Otherwise, the liver is normal in size, morphology and attenuation without appreciable surface nodularity    No results found for: "AFP"      Immunity to Hep A and B: will check with next labs          Allergy and medication list reviewed and updated     PMHX:  has a past medical history of Hyperlipemia and Hypertension.    PSHX:  has a past surgical history that includes Esophagogastroduodenoscopy (N/A, 6/8/2022); Intraluminal gastrointestinal tract imaging via capsule (N/A, 1/20/2023); Small bowel " enteroscopy (N/A, 3/20/2023); Diagnostic laparoscopy (N/A, 4/4/2023); excision, small intestine (N/A, 4/4/2023); laparotomy, exploratory (N/A, 4/4/2023); and Insertion of venous access port (Right, 5/2/2023).    FAMILY HISTORY: Updated and reviewed in [facility]    SOCIAL HISTORY:   Social History     Substance and Sexual Activity   Alcohol Use Yes    Comment: socially       Social History     Substance and Sexual Activity   Drug Use Never       ROS:   GENERAL: Denies fatigue  CARDIOVASCULAR: Denies edema  GI: Denies abdominal pain  SKIN: Denies rash, itching   NEURO: Denies confusion, memory loss, or mood changes    PHYSICAL EXAM:   Friendly Black or  male, in no acute distress; alert and oriented to person, place and time  VITALS: Ht 6' (1.829 m)   Wt 89.8 kg (197 lb 15.6 oz)   BMI 26.85 kg/m²   EYES: Sclerae anicteric  GI: Soft, non-tender, non-distended. No ascites.  EXTREMITIES:  No edema.  SKIN: Warm and dry. No jaundice. No telangectasias noted. No palmar erythema.  NEURO:  No asterixis.  PSYCH:  Thought and speech pattern appropriate. Behavior normal      EDUCATION:  See instructions discussed with patient in Instructions section of the After Visit Summary     ASSESSMENT & PLAN:  67 y.o. male with:  1. Hep B core positive Ab, no chronic Hep B  -- No use of Hep B medications in the past  -- transaminases returned to normal  -- Hep B labs: see HPI  -- synthetic liver function WNL  -- immunity to Hep A: will check with next labs   -- Fibroscan with RTC  -- labs today, then every 6 months while on immunosuppression     2. Chemo use  -- Will repeat labs today. If Hep B core remains positive, Will start Vemlidy 25 mg daily for Hep B prophylaxis. Recommendation for start of Vemlidy 2 weeks before next dose, although he has been on chemo for months so may not be necessary to delay further   -- Once rheum treatment completed, will continue labs (Hep B DNA and CMP) for at least 12 months after last dose of  Hep B prophylaxis    3. Thrombocytopenia, liver cysts ?  -- fibroscan with RTC  -- repeat US with RTC    ADDENDUM 8/28/2023: Repeat core positive ab positive. Start Vemlidy. Asked [facility] to expedite approval since pt is on chemo and his risk for reactivation. Pt aware to start ASAP after receiving     EDUCATION:        Agents with > 10% reactivation risk:   HIGH RISK GROUP > 10% - anti-viral prophylaxis is recommended:   - Anti CD 20 - B cell depleting agents: rituximab, ofatumumab, obinutuzumab   - Anthracycline derivatives: doxorubicin,epirubicin   - High dose steroids: > 20 mg prednisone or equivalent for > 4 weeks   - Undergoing stem cell transplant       Agents with 1-10% reactivation risk:   MODERATE RISK GROUP - monitoring versus Hep B prophylaxis is recommended and typically lasts for 6 months after d/c of immunologic medication:   - TNF-alpha inhibitors: etanercept, adalimumab, certolizumab, infliximab   - Cytokine or integrin inhibitors: abatacept, ustekinumab, natalizumab, vedolizumab   - Tyrosinekinase inhibitors: imatinib, nilotinib   - Low dose steroids: 10-20 mg prednisone or equivalent for > 4 weeks       Agents with <1% reactivation risk:   LOW RISK GROUP - can be carefully monitored with ALT, HBV DNA, and HBsAg with the intent for on-demand therapy if any of above classes of medications are added:   - 6-MP, AZA, MTX, intra-articular steroids, any dose steroid < 1 week, 10 mg prednisone < 4 weeks         Follow up in about 3 months (around 11/28/2023). with labs, US and fibroscan before  Orders Placed This Encounter   Procedures    FibroScan Anderson (Vibration Controlled Transient Elastography)    US Abdomen Complete    Alpha-1-Antitrypsin    CBC Without Differential    Ceruloplasmin    Hepatic Function Panel    Iron and TIBC    Hepatitis A antibody, IgG    Hepatitis B Core Antibody, Total    Hepatitis B Surface Ab, Qualitative    AFP Tumor Marker    Gamma GT    Antimitochondrial Antibody     Hepatitis B Core Antibody, Total    HEPATITIS B VIRAL DNA, QUANTITATIVE    HIV 1/2 Ag/Ab (4th Gen)        Thank you for allowing me to participate in the care of Poli Young    ANU Jefferson    I spent a total of 30 minutes on the day of the visit.This includes face to face time and non-face to face time preparing to see the patient (eg, review of tests), obtaining and/or reviewing separately obtained history, documenting clinical information in the electronic or other health record, independently interpreting results and communicating results to the patient/family/caregiver, and coordinating care.         CC'ed note to:   Amparo Michaels MD

## 2023-08-28 NOTE — Clinical Note
Hey: Can you expediate his Vemlidy? He is on chemo and ideally need to start ASAP. Thanks in advance!

## 2023-08-28 NOTE — Clinical Note
Rx for neuropathy cream  Rx for gabapentin 300mg po qhs Ambulatory Referral to Hepatology-[facility] FOR Hep B POS Delay chemo to next week Cbc,cmp prior to chemo on Wednes next week ( pt needs to see Hepatology first) Referral to Derm -Dr Camp - back lesion      Cbc,cmp,mag  prior to f/u in 4 wks

## 2023-08-28 NOTE — PROGRESS NOTES
Subjective     Patient ID: Poli Young is a 67 y.o. male.    Chief Complaint: No chief complaint on file.  Diagnosis: Small bowel adenocarcinoma .pT3 pN2cM0  Therapy:status post small bowel resection on 4/4/23                Adjuvant mFOLFOX 5/16/23-   HPI  67M underwent work up for anemia and was found to have a small bowel mass about 100cm from IC valve by enteroscopy by Dr Cruz. Got one blood transfusion. He reports history of left central intermittent abdominal pain for several months. No NV. Was found to be anemic on bloodwork. EGD in June 2022 did not show any obvious cause of bleeding.  Capsule endoscopy in January showed blood in small bowel. Enteroscopy showed mass in March that was biopsied and tattooed. Biopsy positive for adenocarcinoma. He is status post small bowel resection on 4/4/23. Pathology positive for  Invasive adenocarcinoma with partial mucinous component (40% of tumor) Lymphovascular invasion is present 17 lymph nodes, 3 positive for metastatic carcinoma (3/17) .Resection margins free of tumor  Pathologic stage pT3 pN Category:  pN2 . Today , he is doing well. Denies dizziness, weakness. No CP, SOB.Appetite improving postop.CT a/p w/contrast 4/13/23 shows Postoperative changes from partial small-bowel resection for a small bowel tumor . No evidence of distant metastases.      Interval Hx: Doing well  S/p C7 completed 8/16/23  No N/V  Mild fatigue  Minor intermittent tingling/numbness in feet  No SOB/CP    Workup for abnormal transaminases shows positive Hep B core ab     Sochx: Retired . Never smoked. Drinks etoh occasionally.   .       Past Medical History:   Diagnosis Date    Hyperlipemia     Hypertension        Past Surgical History:   Procedure Laterality Date    DIAGNOSTIC LAPAROSCOPY N/A 4/4/2023    Procedure: LAPAROSCOPY, DIAGNOSTIC;  Surgeon: Tyrone Martínez MD;  Location: [facility] OR;  Service: General;  Laterality: N/A;    ESOPHAGOGASTRODUODENOSCOPY N/A  6/8/2022    Procedure: EGD (ESOPHAGOGASTRODUODENOSCOPY);  Surgeon: Aamir Rushing MD;  Location: [facility] ENDO;  Service: Endoscopy;  Laterality: N/A;    EXCISION, SMALL INTESTINE N/A 4/4/2023    Procedure: EXCISION, SMALL INTESTINE/SMALL BOWEL RESECTION;  Surgeon: Tyrone Martínez MD;  Location: [facility] OR;  Service: General;  Laterality: N/A;    INSERTION OF VENOUS ACCESS PORT Right 5/2/2023    Procedure: INSERTION, VENOUS ACCESS PORT;  Surgeon: Tyrone Martínez MD;  Location: [facility] OR;  Service: General;  Laterality: Right;    INTRALUMINAL GASTROINTESTINAL TRACT IMAGING VIA CAPSULE N/A 1/20/2023    Procedure: IMAGING PROCEDURE, GI TRACT, INTRALUMINAL, VIA CAPSULE;  Surgeon: Aamir Rushing MD;  Location: [facility] ENDO;  Service: Endoscopy;  Laterality: N/A;    LAPAROTOMY, EXPLORATORY N/A 4/4/2023    Procedure: LAPAROTOMY, EXPLORATORY;  Surgeon: Tyrone Martínez MD;  Location: [facility] OR;  Service: General;  Laterality: N/A;    SMALL BOWEL ENTEROSCOPY N/A 3/20/2023    Procedure: ENTEROSCOPY Upper DBE;  Surgeon: Giancarlo Cruz MD;  Location: [facility] ENDO;  Service: Endoscopy;  Laterality: N/A;        Social History     Tobacco Use    Smoking status: Never    Smokeless tobacco: Never   Substance Use Topics    Alcohol use: Yes     Comment: socially    Drug use: Never        Review of Systems   Constitutional:  Positive for fatigue. Negative for appetite change, fever and unexpected weight change.   HENT:  Negative for mouth sores.    Eyes:  Negative for visual disturbance.   Respiratory:  Negative for cough and shortness of breath.    Cardiovascular:  Negative for chest pain.   Gastrointestinal:  Negative for abdominal pain and diarrhea.   Genitourinary:  Negative for frequency.   Musculoskeletal:  Negative for back pain.   Integumentary:  Negative for rash.   Neurological:  Positive for numbness. Negative for headaches.   Hematological:  Negative for adenopathy.   Psychiatric/Behavioral:  The patient is not  nervous/anxious.         Objective       Vitals:    08/28/23 0801   BP: 132/78   Pulse: 75   SpO2: 97%   Weight: 90 kg (198 lb 6.6 oz)   Height: 6' (1.829 m)           Physical Exam  Constitutional:       Appearance: He is well-developed.   HENT:      Head: Normocephalic.      Mouth/Throat:      Pharynx: No oropharyngeal exudate.   Eyes:      General: No scleral icterus.        Right eye: No discharge.         Left eye: No discharge.      Conjunctiva/sclera: Conjunctivae normal.   Neck:      Thyroid: No thyromegaly.   Cardiovascular:      Rate and Rhythm: Normal rate and regular rhythm.      Heart sounds: Normal heart sounds. No murmur heard.  Pulmonary:      Effort: Pulmonary effort is normal.      Breath sounds: Normal breath sounds. No wheezing or rales.   Abdominal:      General: Bowel sounds are normal.      Palpations: Abdomen is soft.      Tenderness: There is no abdominal tenderness. There is no guarding or rebound.   Musculoskeletal:         General: No swelling. Normal range of motion.      Cervical back: Normal range of motion and neck supple.   Lymphadenopathy:      Cervical: No cervical adenopathy.      Upper Body:      Right upper body: No supraclavicular adenopathy.      Left upper body: No supraclavicular adenopathy.   Skin:     Findings: No erythema or rash.   Neurological:      Mental Status: He is alert and oriented to person, place, and time.      Cranial Nerves: No cranial nerve deficit.   Psychiatric:         Mood and Affect: Mood normal.         Behavior: Behavior normal.            Pathology 4/4/23  Small intestine, segmental resection:   - Invasive adenocarcinoma with partial mucinous component (40% of tumor)       - Tumor invades through muscularis propria into subserosa   - Lymphovascular invasion is present   - 17 lymph nodes, 3 positive for metastatic carcinoma (3/17)   - Resection margins free of tumor   - Background small intestinal mucosa without significant histopathologic alteration    - See CAP Tumor synoptic and comment     CAP Tumor Synoptic:   Procedure:  Segmental resection   Tumor site:  Small intestine, not otherwise specified   Histologic type:  Adenocarcinoma (not otherwise characterized), with mucinous component comprising approximately 40% of tumor   Histologic grade:  G2, moderately differentiated   Tumor size:  5.5 cm in greatest dimension   Tumor extent:  Invades through muscularis propria into subserosa without serosal penetration   Lymphovascular invasion:  Present   Macroscopic tumor perforation:  Not identified   Margin status for invasive carcinoma:  All margins negative for invasive carcinoma   Margin status for dysplasia:  All margins negative for carcinoma in situ (high-grade dysplasia)/ adenoma   Regional lymph node status:  Tumor present in regional lymph nodes   Number of lymph nodes with tumor:  3   Number of lymph nodes examined:  17   PATHOLOGIC STAGE CLASSIFICATION   TNM descriptors:  Not applicable   pT Category:  pT3   pN Category:  pN2   Special studies:  Intact expression of DNA mismatch repair proteins (MLH1, MSH2, MSH6, PMS2) in tumor by immunohistochemistry (performed on prior biopsy KES-, collected 3/20/2023); interpreted by Dr. Garcias)   Comment:  Records from this patient's reported recent colonoscopy are not available for review.       CT a/p w/contrast 4/13/23  Impression:     Postoperative changes from partial small-bowel resection for a small bowel tumor.  Pathology results are pending.     Skin thickening and subcutaneous edema near the umbilicus, which could be inflammatory or infectious.  No organized fluid collections along the surgical incision.     Dilated small bowel loops with gradual tapering distally, likely ileus in the early postoperative setting.     Small volume ascites and trace pneumoperitoneum, likely postoperative.  Anastomotic leak is less likely.  No organized intraperitoneal collections.     Other findings as  described.        CT chest w/out contrast  5/1/23   Impression:     Asymmetric thyroid gland with possible left lobe thyroid nodule measuring up to 2.2 cm.  Further evaluation with thyroid ultrasound examination is recommended.     Mild centrilobular emphysematous changes.     Mucous plugging within the left upper lobe laterally and within the right lower lobe laterally.     Cluster of pulmonary nodules within the upper left upper lobe measuring up to 0.6 cm in size.  These are likely infectious/inflammatory in etiology, however continued surveillance is recommended in this patient with history of malignancy.     Hepatic cysts.         CT c/a/p 7/3/2023   Impression:     1. Postsurgical changes compatible with partial small bowel resection with enteroenteric anastomosis and surgical sutures in the region left upper quadrant, not significantly changed.  2. Near complete interval resolution of previously noted cluster of pulmonary nodules in the left upper lobe.  Cluster of approximately 3 ground-glass pulmonary micro nodules in the left upper lobe and, second cluster of approximately 3 ground-glass pulmonary micro nodules in the right lower lobe, not significantly changed.  A few borderline enlarged mediastinal lymph nodes, not significantly changed.  3. Multiple simple appearing hepatic parenchymal cyst and multiple additional subcentimeter hypodensities that are too small to accurately characterize, not significantly changed.  4. 0.4-cm parenchymal hypodensity in the pancreatic head, not significantly changed.  5. Simple appearing renal cortical cystic lesion in the lateral aspect of the right midpole, not significantly changed. Multiple additional subcentimeter renal cortical hypodensities bilaterally are too small to accurately characterize but not significantly changed when compared to prior.        Lab Results   Component Value Date    WBC 8.07 08/25/2023    HGB 12.4 (L) 08/25/2023    HCT 38.3 (L) 08/25/2023     MCV 95 08/25/2023     (L) 08/25/2023        Latest Reference Range & Units 07/24/23 08:30   CEA 0.0 - 5.0 ng/mL 2.4       Hep B Core Total Ab Non-reactive Reactive Abnormal          Hepatitis B Surface Ag Non-reactive Non-reactive    Hep B C IgM Non-reactive Non-reactive    Hep A IgM Non-reactive Non-reactive    Hepatitis C Ab Non-reactive Non-reactive          Assessment and Plan     Problem List Items Addressed This Visit           Diagnoses and all orders for this visit:    Adenocarcinoma of small bowel  Poli Young is a 67 y.o. male with biopsy proven small bowel adenocarcinoma now status post small bowel resection on 4/4/23.pT3 pN2M0  CT a/p no evidence of distant mets  Adjuvant chemotherapy for 6 mo is recommended for patients with node-positive, completely resected disease. The regimens of choice are CAPOX (capecitabine and oxaliplatin) and FOLFOX (folinic acid, fluorouracil, and oxaliplatin).    CT imaging 7/3/2023 reviewed   S/p C7 mFOLFOX  completed 8/16/23  Hold next chemo cycle 2/2 hep b core ab positivity  \  Immunodeficiency secondary to chemotherapy    Abnormal transaminases  Hep B core antibody positive   Ambulatory Referral to Hepatology-URGENT FOR Hep B POS  Thyroid nodule  US thyroid complete and nodule bx with benign results  Pulmonary nodules  Follow up CT imaging Near complete interval resolution of previously noted cluster of pulmonary nodules in the left upper lobe.  Cluster of approximately 3 ground-glass pulmonary micro nodules in the left upper lobe and, second cluster of approximately 3 ground-glass pulmonary micro nodules in the right lower lobe, not significantly changed.  A few borderline enlarged mediastinal lymph nodes, not significantly changed.  Chemotherapy-induced neuropathy  Rx for neuropathy cream   Rx for gabapentin 300mg po qhs       Chemotherapy induced  thrombocytopenia     Asymptomatic   monitor     Antineoplastic chemotherapy induced anemia  Hb  12.4g/dl  Monitor        Rx for neuropathy cream   Rx for gabapentin 300mg po qhs  Ambulatory Referral to Hepatology-URGENT FOR Hep B POS  Delay chemo to next week  Cbc,cmp prior to chemo on Wednes next week ( pt needs to see Hepatology first)        I spent a total of  40  minutes on the day of the visit.This includes face to face time and non-face to face time preparing to see the patient (eg, review of tests), obtaining and/or reviewing separately obtained history, documenting clinical information in the electronic or other health record, independently interpreting results and communicating results to the patient/family/caregiver, and coordinating care.            Amparo Michaels MD  [facility]

## 2023-08-30 LAB
HBV DNA SERPL NAA+PROBE-ACNC: <10 IU/ML
HBV DNA SERPL NAA+PROBE-LOG IU: <1 LOG (10) IU/ML
HBV DNA SERPL QL NAA+PROBE: NOT DETECTED

## 2023-09-05 ENCOUNTER — TELEPHONE (OUTPATIENT)
Dept: HEMATOLOGY/ONCOLOGY | Facility: CLINIC | Age: 68
End: 2023-09-05
Payer: MEDICARE

## 2023-09-10 ENCOUNTER — OUTPATIENT CASE MANAGEMENT (OUTPATIENT)
Dept: HEMATOLOGY/ONCOLOGY | Facility: CLINIC | Age: 68
End: 2023-09-10
Payer: MEDICARE

## 2023-09-10 RX ORDER — FLUOROURACIL 50 MG/ML
2400 INJECTION, SOLUTION INTRAVENOUS
Status: CANCELLED | OUTPATIENT
Start: 2023-09-11

## 2023-09-10 RX ORDER — FLUOROURACIL 50 MG/ML
400 INJECTION, SOLUTION INTRAVENOUS
Status: CANCELLED | OUTPATIENT
Start: 2023-09-11

## 2023-09-10 RX ORDER — HEPARIN 100 UNIT/ML
500 SYRINGE INTRAVENOUS
Status: CANCELLED | OUTPATIENT
Start: 2023-09-13

## 2023-09-10 RX ORDER — DIPHENHYDRAMINE HYDROCHLORIDE 50 MG/ML
50 INJECTION INTRAMUSCULAR; INTRAVENOUS ONCE AS NEEDED
Status: CANCELLED | OUTPATIENT
Start: 2023-09-11

## 2023-09-10 RX ORDER — EPINEPHRINE 0.3 MG/.3ML
0.3 INJECTION SUBCUTANEOUS ONCE AS NEEDED
Status: CANCELLED | OUTPATIENT
Start: 2023-09-11

## 2023-09-10 RX ORDER — SODIUM CHLORIDE 0.9 % (FLUSH) 0.9 %
10 SYRINGE (ML) INJECTION
Status: CANCELLED | OUTPATIENT
Start: 2023-09-11

## 2023-09-10 RX ORDER — SODIUM CHLORIDE 0.9 % (FLUSH) 0.9 %
10 SYRINGE (ML) INJECTION
Status: CANCELLED | OUTPATIENT
Start: 2023-09-13

## 2023-09-10 RX ORDER — HEPARIN 100 UNIT/ML
500 SYRINGE INTRAVENOUS
Status: CANCELLED | OUTPATIENT
Start: 2023-09-11

## 2023-09-11 ENCOUNTER — INFUSION (OUTPATIENT)
Dept: INFUSION THERAPY | Facility: HOSPITAL | Age: 68
End: 2023-09-11
Attending: INTERNAL MEDICINE
Payer: MEDICARE

## 2023-09-11 VITALS
HEART RATE: 78 BPM | SYSTOLIC BLOOD PRESSURE: 125 MMHG | BODY MASS INDEX: 27.77 KG/M2 | RESPIRATION RATE: 18 BRPM | WEIGHT: 205 LBS | HEIGHT: 72 IN | OXYGEN SATURATION: 96 % | TEMPERATURE: 99 F | DIASTOLIC BLOOD PRESSURE: 74 MMHG

## 2023-09-11 DIAGNOSIS — C17.9 ADENOCARCINOMA OF SMALL BOWEL: Primary | ICD-10-CM

## 2023-09-11 PROCEDURE — 96416 CHEMO PROLONG INFUSE W/PUMP: CPT

## 2023-09-11 PROCEDURE — 96367 TX/PROPH/DG ADDL SEQ IV INF: CPT

## 2023-09-11 PROCEDURE — 63600175 PHARM REV CODE 636 W HCPCS: Performed by: INTERNAL MEDICINE

## 2023-09-11 PROCEDURE — 96415 CHEMO IV INFUSION ADDL HR: CPT

## 2023-09-11 PROCEDURE — 96413 CHEMO IV INFUSION 1 HR: CPT

## 2023-09-11 PROCEDURE — 96411 CHEMO IV PUSH ADDL DRUG: CPT

## 2023-09-11 PROCEDURE — 96368 THER/DIAG CONCURRENT INF: CPT

## 2023-09-11 PROCEDURE — 25000003 PHARM REV CODE 250: Performed by: INTERNAL MEDICINE

## 2023-09-11 RX ORDER — FLUOROURACIL 50 MG/ML
400 INJECTION, SOLUTION INTRAVENOUS
Status: COMPLETED | OUTPATIENT
Start: 2023-09-11 | End: 2023-09-11

## 2023-09-11 RX ADMIN — FLUOROURACIL 855 MG: 50 INJECTION, SOLUTION INTRAVENOUS at 12:09

## 2023-09-11 RX ADMIN — DEXAMETHASONE SODIUM PHOSPHATE 0.25 MG: 10 INJECTION, SOLUTION INTRAMUSCULAR; INTRAVENOUS at 09:09

## 2023-09-11 RX ADMIN — LEUCOVORIN CALCIUM 855 MG: 500 INJECTION, POWDER, LYOPHILIZED, FOR SOLUTION INTRAMUSCULAR; INTRAVENOUS at 10:09

## 2023-09-11 RX ADMIN — FLUOROURACIL 5000 MG: 50 INJECTION, SOLUTION INTRAVENOUS at 12:09

## 2023-09-11 RX ADMIN — OXALIPLATIN 182 MG: 100 INJECTION, SOLUTION, CONCENTRATE INTRAVENOUS at 10:09

## 2023-09-13 ENCOUNTER — INFUSION (OUTPATIENT)
Dept: INFUSION THERAPY | Facility: HOSPITAL | Age: 68
End: 2023-09-13
Attending: INTERNAL MEDICINE
Payer: MEDICARE

## 2023-09-13 VITALS
TEMPERATURE: 98 F | OXYGEN SATURATION: 98 % | SYSTOLIC BLOOD PRESSURE: 139 MMHG | RESPIRATION RATE: 15 BRPM | HEART RATE: 65 BPM | DIASTOLIC BLOOD PRESSURE: 75 MMHG

## 2023-09-13 DIAGNOSIS — C17.9 ADENOCARCINOMA OF SMALL BOWEL: Primary | ICD-10-CM

## 2023-09-13 PROCEDURE — 96377 APPLICATON ON-BODY INJECTOR: CPT

## 2023-09-13 PROCEDURE — 63600175 PHARM REV CODE 636 W HCPCS: Mod: JZ,JG | Performed by: INTERNAL MEDICINE

## 2023-09-13 RX ORDER — HEPARIN 100 UNIT/ML
500 SYRINGE INTRAVENOUS
Status: DISCONTINUED | OUTPATIENT
Start: 2023-09-13 | End: 2023-09-13 | Stop reason: HOSPADM

## 2023-09-13 RX ADMIN — HEPARIN 500 UNITS: 100 SYRINGE at 10:09

## 2023-09-13 RX ADMIN — PEGFILGRASTIM 6 MG: KIT SUBCUTANEOUS at 10:09

## 2023-09-13 NOTE — PLAN OF CARE
"Pt arrived to unit, ambulatory and accompanied by family, for chemo pump d/c, port flush, and Neulasta on body injector placement. Pt alert and oriented, states he "feels pretty good today." No complaints upon arrival. Tolerated pump d/c with no problems. Port flushed with good blood return and heparin locked. Neulasta on body injector placed - confirmed to be functional. Pt discharged home upon completion in NAD.  "

## 2023-09-22 ENCOUNTER — LAB VISIT (OUTPATIENT)
Dept: LAB | Facility: HOSPITAL | Age: 68
End: 2023-09-22
Attending: INTERNAL MEDICINE
Payer: MEDICARE

## 2023-09-22 DIAGNOSIS — E04.1 THYROID NODULE: ICD-10-CM

## 2023-09-22 DIAGNOSIS — C17.9 ADENOCARCINOMA OF SMALL BOWEL: ICD-10-CM

## 2023-09-22 DIAGNOSIS — R91.8 PULMONARY NODULES: ICD-10-CM

## 2023-09-22 LAB
ALBUMIN SERPL BCP-MCNC: 3.8 G/DL (ref 3.5–5.2)
ALP SERPL-CCNC: 171 U/L (ref 55–135)
ALT SERPL W/O P-5'-P-CCNC: 40 U/L (ref 10–44)
ANION GAP SERPL CALC-SCNC: 8 MMOL/L (ref 8–16)
AST SERPL-CCNC: 36 U/L (ref 10–40)
BASOPHILS # BLD AUTO: 0.03 K/UL (ref 0–0.2)
BASOPHILS NFR BLD: 0.4 % (ref 0–1.9)
BILIRUB SERPL-MCNC: 0.3 MG/DL (ref 0.1–1)
BUN SERPL-MCNC: 13 MG/DL (ref 8–23)
CALCIUM SERPL-MCNC: 9.3 MG/DL (ref 8.7–10.5)
CHLORIDE SERPL-SCNC: 107 MMOL/L (ref 95–110)
CO2 SERPL-SCNC: 24 MMOL/L (ref 23–29)
CREAT SERPL-MCNC: 1 MG/DL (ref 0.5–1.4)
DIFFERENTIAL METHOD: ABNORMAL
EOSINOPHIL # BLD AUTO: 0.2 K/UL (ref 0–0.5)
EOSINOPHIL NFR BLD: 2.2 % (ref 0–8)
ERYTHROCYTE [DISTWIDTH] IN BLOOD BY AUTOMATED COUNT: 16.6 % (ref 11.5–14.5)
EST. GFR  (NO RACE VARIABLE): >60 ML/MIN/1.73 M^2
GLUCOSE SERPL-MCNC: 98 MG/DL (ref 70–110)
HCT VFR BLD AUTO: 37.1 % (ref 40–54)
HGB BLD-MCNC: 12.4 G/DL (ref 14–18)
IMM GRANULOCYTES # BLD AUTO: 0.25 K/UL (ref 0–0.04)
IMM GRANULOCYTES NFR BLD AUTO: 3 % (ref 0–0.5)
LYMPHOCYTES # BLD AUTO: 1.6 K/UL (ref 1–4.8)
LYMPHOCYTES NFR BLD: 19.3 % (ref 18–48)
MAGNESIUM SERPL-MCNC: 1.6 MG/DL (ref 1.6–2.6)
MCH RBC QN AUTO: 31.6 PG (ref 27–31)
MCHC RBC AUTO-ENTMCNC: 33.4 G/DL (ref 32–36)
MCV RBC AUTO: 94 FL (ref 82–98)
MONOCYTES # BLD AUTO: 1 K/UL (ref 0.3–1)
MONOCYTES NFR BLD: 11.6 % (ref 4–15)
NEUTROPHILS # BLD AUTO: 5.3 K/UL (ref 1.8–7.7)
NEUTROPHILS NFR BLD: 63.5 % (ref 38–73)
NRBC BLD-RTO: 0 /100 WBC
PLATELET # BLD AUTO: 87 K/UL (ref 150–450)
PMV BLD AUTO: 8.5 FL (ref 9.2–12.9)
POTASSIUM SERPL-SCNC: 3.9 MMOL/L (ref 3.5–5.1)
PROT SERPL-MCNC: 7.3 G/DL (ref 6–8.4)
RBC # BLD AUTO: 3.93 M/UL (ref 4.6–6.2)
SODIUM SERPL-SCNC: 139 MMOL/L (ref 136–145)
WBC # BLD AUTO: 8.34 K/UL (ref 3.9–12.7)

## 2023-09-22 PROCEDURE — 36415 COLL VENOUS BLD VENIPUNCTURE: CPT | Performed by: INTERNAL MEDICINE

## 2023-09-22 PROCEDURE — 80053 COMPREHEN METABOLIC PANEL: CPT | Performed by: INTERNAL MEDICINE

## 2023-09-22 PROCEDURE — 83735 ASSAY OF MAGNESIUM: CPT | Performed by: INTERNAL MEDICINE

## 2023-09-22 PROCEDURE — 85025 COMPLETE CBC W/AUTO DIFF WBC: CPT | Performed by: INTERNAL MEDICINE

## 2023-09-25 ENCOUNTER — OFFICE VISIT (OUTPATIENT)
Dept: HEMATOLOGY/ONCOLOGY | Facility: CLINIC | Age: 68
End: 2023-09-25
Payer: MEDICARE

## 2023-09-25 VITALS
WEIGHT: 205.25 LBS | OXYGEN SATURATION: 98 % | DIASTOLIC BLOOD PRESSURE: 87 MMHG | HEIGHT: 72 IN | HEART RATE: 68 BPM | BODY MASS INDEX: 27.8 KG/M2 | SYSTOLIC BLOOD PRESSURE: 145 MMHG

## 2023-09-25 DIAGNOSIS — C17.9 ADENOCARCINOMA OF SMALL BOWEL: Primary | ICD-10-CM

## 2023-09-25 DIAGNOSIS — D69.59 CHEMOTHERAPY-INDUCED THROMBOCYTOPENIA: ICD-10-CM

## 2023-09-25 DIAGNOSIS — T45.1X5A CHEMOTHERAPY-INDUCED NEUROPATHY: ICD-10-CM

## 2023-09-25 DIAGNOSIS — R91.8 PULMONARY NODULES: ICD-10-CM

## 2023-09-25 DIAGNOSIS — T45.1X5A IMMUNODEFICIENCY SECONDARY TO CHEMOTHERAPY: ICD-10-CM

## 2023-09-25 DIAGNOSIS — G62.0 CHEMOTHERAPY-INDUCED NEUROPATHY: ICD-10-CM

## 2023-09-25 DIAGNOSIS — E04.1 THYROID NODULE: ICD-10-CM

## 2023-09-25 DIAGNOSIS — R76.8 HEPATITIS B CORE ANTIBODY POSITIVE: ICD-10-CM

## 2023-09-25 DIAGNOSIS — D84.821 IMMUNODEFICIENCY SECONDARY TO CHEMOTHERAPY: ICD-10-CM

## 2023-09-25 DIAGNOSIS — D64.81 ANTINEOPLASTIC CHEMOTHERAPY INDUCED ANEMIA: ICD-10-CM

## 2023-09-25 DIAGNOSIS — T45.1X5A ANTINEOPLASTIC CHEMOTHERAPY INDUCED ANEMIA: ICD-10-CM

## 2023-09-25 DIAGNOSIS — T45.1X5A CHEMOTHERAPY-INDUCED THROMBOCYTOPENIA: ICD-10-CM

## 2023-09-25 DIAGNOSIS — Z79.899 IMMUNODEFICIENCY SECONDARY TO CHEMOTHERAPY: ICD-10-CM

## 2023-09-25 PROCEDURE — 99999 PR PBB SHADOW E&M-EST. PATIENT-LVL III: CPT | Mod: PBBFAC,,, | Performed by: INTERNAL MEDICINE

## 2023-09-25 PROCEDURE — 4010F ACE/ARB THERAPY RXD/TAKEN: CPT | Mod: CPTII,S$GLB,, | Performed by: INTERNAL MEDICINE

## 2023-09-25 PROCEDURE — 1126F PR PAIN SEVERITY QUANTIFIED, NO PAIN PRESENT: ICD-10-PCS | Mod: CPTII,S$GLB,, | Performed by: INTERNAL MEDICINE

## 2023-09-25 PROCEDURE — 1101F PT FALLS ASSESS-DOCD LE1/YR: CPT | Mod: CPTII,S$GLB,, | Performed by: INTERNAL MEDICINE

## 2023-09-25 PROCEDURE — 99214 PR OFFICE/OUTPT VISIT, EST, LEVL IV, 30-39 MIN: ICD-10-PCS | Mod: S$GLB,,, | Performed by: INTERNAL MEDICINE

## 2023-09-25 PROCEDURE — 3008F PR BODY MASS INDEX (BMI) DOCUMENTED: ICD-10-PCS | Mod: CPTII,S$GLB,, | Performed by: INTERNAL MEDICINE

## 2023-09-25 PROCEDURE — 1101F PR PT FALLS ASSESS DOC 0-1 FALLS W/OUT INJ PAST YR: ICD-10-PCS | Mod: CPTII,S$GLB,, | Performed by: INTERNAL MEDICINE

## 2023-09-25 PROCEDURE — 3288F PR FALLS RISK ASSESSMENT DOCUMENTED: ICD-10-PCS | Mod: CPTII,S$GLB,, | Performed by: INTERNAL MEDICINE

## 2023-09-25 PROCEDURE — 3077F SYST BP >= 140 MM HG: CPT | Mod: CPTII,S$GLB,, | Performed by: INTERNAL MEDICINE

## 2023-09-25 PROCEDURE — 3288F FALL RISK ASSESSMENT DOCD: CPT | Mod: CPTII,S$GLB,, | Performed by: INTERNAL MEDICINE

## 2023-09-25 PROCEDURE — 1126F AMNT PAIN NOTED NONE PRSNT: CPT | Mod: CPTII,S$GLB,, | Performed by: INTERNAL MEDICINE

## 2023-09-25 PROCEDURE — 3079F PR MOST RECENT DIASTOLIC BLOOD PRESSURE 80-89 MM HG: ICD-10-PCS | Mod: CPTII,S$GLB,, | Performed by: INTERNAL MEDICINE

## 2023-09-25 PROCEDURE — 4010F PR ACE/ARB THEARPY RXD/TAKEN: ICD-10-PCS | Mod: CPTII,S$GLB,, | Performed by: INTERNAL MEDICINE

## 2023-09-25 PROCEDURE — 99999 PR PBB SHADOW E&M-EST. PATIENT-LVL III: ICD-10-PCS | Mod: PBBFAC,,, | Performed by: INTERNAL MEDICINE

## 2023-09-25 PROCEDURE — 3077F PR MOST RECENT SYSTOLIC BLOOD PRESSURE >= 140 MM HG: ICD-10-PCS | Mod: CPTII,S$GLB,, | Performed by: INTERNAL MEDICINE

## 2023-09-25 PROCEDURE — 3008F BODY MASS INDEX DOCD: CPT | Mod: CPTII,S$GLB,, | Performed by: INTERNAL MEDICINE

## 2023-09-25 PROCEDURE — 3079F DIAST BP 80-89 MM HG: CPT | Mod: CPTII,S$GLB,, | Performed by: INTERNAL MEDICINE

## 2023-09-25 PROCEDURE — 99214 OFFICE O/P EST MOD 30 MIN: CPT | Mod: S$GLB,,, | Performed by: INTERNAL MEDICINE

## 2023-09-25 RX ORDER — GABAPENTIN 300 MG/1
300 CAPSULE ORAL NIGHTLY
Qty: 30 CAPSULE | Refills: 1 | Status: SHIPPED | OUTPATIENT
Start: 2023-09-25 | End: 2023-11-20 | Stop reason: SDUPTHER

## 2023-09-25 NOTE — Clinical Note
Hold chemo today Chemo on Wednes CBC Wednes in AM prior to chemo Cbc,cmp. Fe studies prior to chemo and f/u on 10/10/23

## 2023-09-25 NOTE — PROGRESS NOTES
Subjective     Patient ID: Poli Young is a 67 y.o. male.    Chief Complaint: No chief complaint on file.  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 Got one blood transfusion. He reports history of left central intermittent abdominal pain for several months. No NV. Was found to be anemic on bloodwork. EGD in June 2022 did not show any obvious cause of bleeding.  Capsule endoscopy in January showed blood in small bowel. Enteroscopy showed mass in March that was biopsied and tattooed. Biopsy positive for adenocarcinoma. He is status post small bowel resection on 4/4/23. Pathology positive for  Invasive adenocarcinoma with partial mucinous component (40% of tumor) Lymphovascular invasion is present 17 lymph nodes, 3 positive for metastatic carcinoma (3/17) .Resection margins free of tumor  Pathologic stage pT3 pN Category:  pN2 . Today , he is doing well. Denies dizziness, weakness. No CP, SOB.Appetite improving postop.CT a/p w/contrast 4/13/23 shows Postoperative changes from partial small-bowel resection for a small bowel tumor . No evidence of distant metastases.      Interval Hx: Doing well  S/p C8 completed 9/13/23  No N/V  No fatigue  Minor intermittent tingling/numbness in feet  Remains on gabapetin  No SOB/CP    Workup for abnormal transaminases shows positive Hep B core ab   Taking Vemlidy x 2 wks    Sochx: Retired . Never smoked. Drinks etoh occasionally.   .       Past Medical History:   Diagnosis Date    Hyperlipemia     Hypertension        Past Surgical History:   Procedure Laterality Date    DIAGNOSTIC LAPAROSCOPY N/A 4/4/2023    Procedure: LAPAROSCOPY, DIAGNOSTIC;  Surgeon: Tyrone Martínez MD;  Location: [facility];  Service: General;   Laterality: N/A;    ESOPHAGOGASTRODUODENOSCOPY N/A 6/8/2022    Procedure: EGD (ESOPHAGOGASTRODUODENOSCOPY);  Surgeon: Aamir Rushing MD;  Location: [facility] ENDO;  Service: Endoscopy;  Laterality: N/A;    EXCISION, SMALL INTESTINE N/A 4/4/2023    Procedure: EXCISION, SMALL INTESTINE/SMALL BOWEL RESECTION;  Surgeon: Tyrone Martínez MD;  Location: [facility];  Service: General;  Laterality: N/A;    INSERTION OF VENOUS ACCESS PORT Right 5/2/2023    Procedure: INSERTION, VENOUS ACCESS PORT;  Surgeon: Tyrone Martínez MD;  Location: [facility] OR;  Service: General;  Laterality: Right;    INTRALUMINAL GASTROINTESTINAL TRACT IMAGING VIA CAPSULE N/A 1/20/2023    Procedure: IMAGING PROCEDURE, GI TRACT, INTRALUMINAL, VIA CAPSULE;  Surgeon: Aamir Rushing MD;  Location: [facility];  Service: Endoscopy;  Laterality: N/A;    LAPAROTOMY, EXPLORATORY N/A 4/4/2023    Procedure: LAPAROTOMY, EXPLORATORY;  Surgeon: Tyrone Martínez MD;  Location: [facility] OR;  Service: General;  Laterality: N/A;    SMALL BOWEL ENTEROSCOPY N/A 3/20/2023    Procedure: ENTEROSCOPY Upper DBE;  Surgeon: Giancarlo Cruz MD;  Location: [facility];  Service: Endoscopy;  Laterality: N/A;        Social History     Tobacco Use    Smoking status: Never    Smokeless tobacco: Never   Substance Use Topics    Alcohol use: Yes     Comment: daily use for years, stopped    Drug use: Never        Review of Systems   Constitutional:  Positive for fatigue. Negative for appetite change, fever and unexpected weight change.   HENT:  Negative for mouth sores.    Eyes:  Negative for visual disturbance.   Respiratory:  Negative for cough and shortness of breath.    Cardiovascular:  Negative for chest pain.   Gastrointestinal:  Negative for abdominal pain and diarrhea.   Genitourinary:  Negative for frequency.   Musculoskeletal:  Negative for back pain.   Integumentary:  Negative for rash.   Neurological:  Positive for numbness. Negative for headaches.   Hematological:  Negative  for adenopathy.   Psychiatric/Behavioral:  The patient is not nervous/anxious.           Objective       Vitals:    09/25/23 0803   BP: (!) 145/87   Pulse: 68   SpO2: 98%   Weight: 93.1 kg (205 lb 4 oz)   Height: 6' (1.829 m)             Physical Exam  Constitutional:       Appearance: He is well-developed.   HENT:      Head: Normocephalic.      Mouth/Throat:      Pharynx: No oropharyngeal exudate.   Eyes:      General: No scleral icterus.        Right eye: No discharge.         Left eye: No discharge.      Conjunctiva/sclera: Conjunctivae normal.   Neck:      Thyroid: No thyromegaly.   Cardiovascular:      Rate and Rhythm: Normal rate and regular rhythm.      Heart sounds: Normal heart sounds. No murmur heard.  Pulmonary:      Effort: Pulmonary effort is normal.      Breath sounds: Normal breath sounds. No wheezing or rales.   Abdominal:      General: Bowel sounds are normal.      Palpations: Abdomen is soft.      Tenderness: There is no abdominal tenderness. There is no guarding or rebound.   Musculoskeletal:         General: No swelling. Normal range of motion.      Cervical back: Normal range of motion and neck supple.   Lymphadenopathy:      Cervical: No cervical adenopathy.      Upper Body:      Right upper body: No supraclavicular adenopathy.      Left upper body: No supraclavicular adenopathy.   Skin:     Findings: No erythema or rash.   Neurological:      Mental Status: He is alert and oriented to person, place, and time.      Cranial Nerves: No cranial nerve deficit.   Psychiatric:         Mood and Affect: Mood normal.         Behavior: Behavior normal.              Pathology 4/4/23  Small intestine, segmental resection:   - Invasive adenocarcinoma with partial mucinous component (40% of tumor)       - Tumor invades through muscularis propria into subserosa   - Lymphovascular invasion is present   - 17 lymph nodes, 3 positive for metastatic carcinoma (3/17)   - Resection margins free of tumor   -  Background small intestinal mucosa without significant histopathologic alteration   - See CAP Tumor synoptic and comment     CAP Tumor Synoptic:   Procedure:  Segmental resection   Tumor site:  Small intestine, not otherwise specified   Histologic type:  Adenocarcinoma (not otherwise characterized), with mucinous component comprising approximately 40% of tumor   Histologic grade:  G2, moderately differentiated   Tumor size:  5.5 cm in greatest dimension   Tumor extent:  Invades through muscularis propria into subserosa without serosal penetration   Lymphovascular invasion:  Present   Macroscopic tumor perforation:  Not identified   Margin status for invasive carcinoma:  All margins negative for invasive carcinoma   Margin status for dysplasia:  All margins negative for carcinoma in situ (high-grade dysplasia)/ adenoma   Regional lymph node status:  Tumor present in regional lymph nodes   Number of lymph nodes with tumor:  3   Number of lymph nodes examined:  17   PATHOLOGIC STAGE CLASSIFICATION   TNM descriptors:  Not applicable   pT Category:  pT3   pN Category:  pN2   Special studies:  Intact expression of DNA mismatch repair proteins (MLH1, MSH2, MSH6, PMS2) in tumor by immunohistochemistry (performed on prior biopsy KES-, collected 3/20/2023); interpreted by Dr. Garcias)   Comment:  Records from this patient's reported recent colonoscopy are not available for review.       CT a/p w/contrast 4/13/23  Impression:     Postoperative changes from partial small-bowel resection for a small bowel tumor.  Pathology results are pending.     Skin thickening and subcutaneous edema near the umbilicus, which could be inflammatory or infectious.  No organized fluid collections along the surgical incision.     Dilated small bowel loops with gradual tapering distally, likely ileus in the early postoperative setting.     Small volume ascites and trace pneumoperitoneum, likely postoperative.  Anastomotic leak is less  likely.  No organized intraperitoneal collections.     Other findings as described.        CT chest w/out contrast  5/1/23   Impression:     Asymmetric thyroid gland with possible left lobe thyroid nodule measuring up to 2.2 cm.  Further evaluation with thyroid ultrasound examination is recommended.     Mild centrilobular emphysematous changes.     Mucous plugging within the left upper lobe laterally and within the right lower lobe laterally.     Cluster of pulmonary nodules within the upper left upper lobe measuring up to 0.6 cm in size.  These are likely infectious/inflammatory in etiology, however continued surveillance is recommended in this patient with history of malignancy.     Hepatic cysts.         CT c/a/p 7/3/2023   Impression:     1. Postsurgical changes compatible with partial small bowel resection with enteroenteric anastomosis and surgical sutures in the region left upper quadrant, not significantly changed.  2. Near complete interval resolution of previously noted cluster of pulmonary nodules in the left upper lobe.  Cluster of approximately 3 ground-glass pulmonary micro nodules in the left upper lobe and, second cluster of approximately 3 ground-glass pulmonary micro nodules in the right lower lobe, not significantly changed.  A few borderline enlarged mediastinal lymph nodes, not significantly changed.  3. Multiple simple appearing hepatic parenchymal cyst and multiple additional subcentimeter hypodensities that are too small to accurately characterize, not significantly changed.  4. 0.4-cm parenchymal hypodensity in the pancreatic head, not significantly changed.  5. Simple appearing renal cortical cystic lesion in the lateral aspect of the right midpole, not significantly changed. Multiple additional subcentimeter renal cortical hypodensities bilaterally are too small to accurately characterize but not significantly changed when compared to prior.        Lab Results   Component Value Date    WBC  8.34 09/22/2023    HGB 12.4 (L) 09/22/2023    HCT 37.1 (L) 09/22/2023    MCV 94 09/22/2023    PLT 87 (L) 09/22/2023        Latest Reference Range & Units 07/24/23 08:30   CEA 0.0 - 5.0 ng/mL 2.4       Hep B Core Total Ab Non-reactive Reactive Abnormal          Hepatitis B Surface Ag Non-reactive Non-reactive    Hep B C IgM Non-reactive Non-reactive    Hep A IgM Non-reactive Non-reactive    Hepatitis C Ab Non-reactive Non-reactive          Assessment and Plan     Problem List Items Addressed This Visit           Diagnoses and all orders for this visit:    Adenocarcinoma of small bowel  Poli Young is a 67 y.o. male with biopsy proven small bowel adenocarcinoma now status post small bowel resection on 4/4/23.pT3 pN2M0  CT a/p no evidence of distant mets  Adjuvant chemotherapy for 6 mo is recommended for patients with node-positive, completely resected disease. The regimens of choice are CAPOX (capecitabine and oxaliplatin) and FOLFOX (folinic acid, fluorouracil, and oxaliplatin).    CT imaging 7/3/2023 reviewed   S/p S/p C8 completed 9/13/23  Hold chemo 2/2 Chemo-induced thrombocytopenia  Plan dosage adjustment      Immunodeficiency secondary to chemotherapy    Abnormal transaminases  Hep B core antibody positive   F/b  Hepatology  Cont Vemlidy  Thyroid nodule  US thyroid complete and nodule bx with benign results  Pulmonary nodules  Follow up CT imaging Near complete interval resolution of previously noted cluster of pulmonary nodules in the left upper lobe.  Cluster of approximately 3 ground-glass pulmonary micro nodules in the left upper lobe and, second cluster of approximately 3 ground-glass pulmonary micro nodules in the right lower lobe, not significantly changed.  A few borderline enlarged mediastinal lymph nodes, not significantly changed.  Chemotherapy-induced neuropathy  Cont neuropathy cream   Rx refill for gabapentin 300mg po qhs       Chemotherapy induced  thrombocytopenia  Plt ct 87k  Asymptomatic    monitor     Antineoplastic chemotherapy induced anemia  Hb 12.4g/dl  Check Fe studies  Monitor        Hold chemo today  Chemo on Wednes  CBC Wednes in AM prior to chemo  Cbc,cmp prior to chemo and f/u on 10/10/23        I spent a total of  40  minutes on the day of the visit.This includes face to face time and non-face to face time preparing to see the patient (eg, review of tests), obtaining and/or reviewing separately obtained history, documenting clinical information in the electronic or other health record, independently interpreting results and communicating results to the patient/family/caregiver, and coordinating care.            mAparo Michaels MD  [facility]

## 2023-09-27 ENCOUNTER — INFUSION (OUTPATIENT)
Dept: INFUSION THERAPY | Facility: HOSPITAL | Age: 68
End: 2023-09-27
Attending: INTERNAL MEDICINE
Payer: MEDICARE

## 2023-09-27 VITALS
DIASTOLIC BLOOD PRESSURE: 76 MMHG | HEIGHT: 72 IN | BODY MASS INDEX: 27.93 KG/M2 | RESPIRATION RATE: 18 BRPM | OXYGEN SATURATION: 97 % | WEIGHT: 206.19 LBS | SYSTOLIC BLOOD PRESSURE: 147 MMHG | TEMPERATURE: 98 F | HEART RATE: 67 BPM

## 2023-09-27 DIAGNOSIS — C17.9 ADENOCARCINOMA OF SMALL BOWEL: Primary | ICD-10-CM

## 2023-09-27 PROCEDURE — 96415 CHEMO IV INFUSION ADDL HR: CPT

## 2023-09-27 PROCEDURE — 63600175 PHARM REV CODE 636 W HCPCS: Performed by: INTERNAL MEDICINE

## 2023-09-27 PROCEDURE — 96413 CHEMO IV INFUSION 1 HR: CPT

## 2023-09-27 PROCEDURE — 96416 CHEMO PROLONG INFUSE W/PUMP: CPT

## 2023-09-27 PROCEDURE — 96375 TX/PRO/DX INJ NEW DRUG ADDON: CPT

## 2023-09-27 PROCEDURE — 96368 THER/DIAG CONCURRENT INF: CPT

## 2023-09-27 PROCEDURE — 25000003 PHARM REV CODE 250: Performed by: INTERNAL MEDICINE

## 2023-09-27 RX ORDER — HEPARIN 100 UNIT/ML
500 SYRINGE INTRAVENOUS
Status: CANCELLED | OUTPATIENT
Start: 2023-09-27

## 2023-09-27 RX ORDER — FLUOROURACIL 50 MG/ML
400 INJECTION, SOLUTION INTRAVENOUS
Status: COMPLETED | OUTPATIENT
Start: 2023-09-27 | End: 2023-09-27

## 2023-09-27 RX ORDER — EPINEPHRINE 0.3 MG/.3ML
0.3 INJECTION SUBCUTANEOUS ONCE AS NEEDED
Status: CANCELLED | OUTPATIENT
Start: 2023-09-27

## 2023-09-27 RX ORDER — HEPARIN 100 UNIT/ML
500 SYRINGE INTRAVENOUS
Status: CANCELLED | OUTPATIENT
Start: 2023-09-29

## 2023-09-27 RX ORDER — SODIUM CHLORIDE 0.9 % (FLUSH) 0.9 %
10 SYRINGE (ML) INJECTION
Status: CANCELLED | OUTPATIENT
Start: 2023-09-27

## 2023-09-27 RX ORDER — SODIUM CHLORIDE 0.9 % (FLUSH) 0.9 %
10 SYRINGE (ML) INJECTION
Status: CANCELLED | OUTPATIENT
Start: 2023-09-29

## 2023-09-27 RX ORDER — FLUOROURACIL 50 MG/ML
400 INJECTION, SOLUTION INTRAVENOUS
Status: CANCELLED | OUTPATIENT
Start: 2023-09-27

## 2023-09-27 RX ORDER — FLUOROURACIL 50 MG/ML
2400 INJECTION, SOLUTION INTRAVENOUS
Status: CANCELLED | OUTPATIENT
Start: 2023-09-27

## 2023-09-27 RX ORDER — DIPHENHYDRAMINE HYDROCHLORIDE 50 MG/ML
50 INJECTION INTRAMUSCULAR; INTRAVENOUS ONCE AS NEEDED
Status: CANCELLED | OUTPATIENT
Start: 2023-09-27

## 2023-09-27 RX ADMIN — LEUCOVORIN CALCIUM 855 MG: 200 INJECTION, POWDER, LYOPHILIZED, FOR SOLUTION INTRAMUSCULAR; INTRAVENOUS at 10:09

## 2023-09-27 RX ADMIN — DEXAMETHASONE SODIUM PHOSPHATE 0.25 MG: 10 INJECTION, SOLUTION INTRAMUSCULAR; INTRAVENOUS at 09:09

## 2023-09-27 RX ADMIN — FLUOROURACIL 5000 MG: 50 INJECTION, SOLUTION INTRAVENOUS at 12:09

## 2023-09-27 RX ADMIN — OXALIPLATIN 170 MG: 5 INJECTION, SOLUTION INTRAVENOUS at 10:09

## 2023-09-27 RX ADMIN — FLUOROURACIL 855 MG: 50 INJECTION, SOLUTION INTRAVENOUS at 12:09

## 2023-09-27 NOTE — PLAN OF CARE
Pt arrived to unit, ambulatory and accompanied by spouse, for chemotherapy. Pt alert and oriented with no complaints upon arrival.     Port-a-cath accessed using sterile technique - biopatch and skin barrier applied.    Pre meds of aloxi/dex given as per plan.     Oxaliplatin administered concurrently with Leucovorin as per plan - pt tolerated with no problems or S&S of reaction.     Adrucil administered IVP over 5 minutes with good blood return from port.     Adrucil via chemo pump applied to pt - new batteries placed, settings and functionality confirmed.     Pt discharged home upon completion in NAD.

## 2023-09-29 ENCOUNTER — INFUSION (OUTPATIENT)
Dept: INFUSION THERAPY | Facility: HOSPITAL | Age: 68
End: 2023-09-29
Attending: INTERNAL MEDICINE
Payer: MEDICARE

## 2023-09-29 VITALS
DIASTOLIC BLOOD PRESSURE: 70 MMHG | HEART RATE: 62 BPM | OXYGEN SATURATION: 98 % | SYSTOLIC BLOOD PRESSURE: 141 MMHG | TEMPERATURE: 98 F | RESPIRATION RATE: 18 BRPM

## 2023-09-29 DIAGNOSIS — C17.9 ADENOCARCINOMA OF SMALL BOWEL: Primary | ICD-10-CM

## 2023-09-29 PROCEDURE — 63600175 PHARM REV CODE 636 W HCPCS: Mod: JZ,JG | Performed by: INTERNAL MEDICINE

## 2023-09-29 PROCEDURE — 96523 IRRIG DRUG DELIVERY DEVICE: CPT

## 2023-09-29 PROCEDURE — 96377 APPLICATON ON-BODY INJECTOR: CPT

## 2023-09-29 RX ORDER — HEPARIN 100 UNIT/ML
500 SYRINGE INTRAVENOUS
Status: DISCONTINUED | OUTPATIENT
Start: 2023-09-29 | End: 2023-09-29 | Stop reason: HOSPADM

## 2023-09-29 RX ADMIN — PEGFILGRASTIM 6 MG: KIT SUBCUTANEOUS at 09:09

## 2023-09-29 RX ADMIN — HEPARIN 500 UNITS: 100 SYRINGE at 08:09

## 2023-09-29 NOTE — PLAN OF CARE
Patient ambulated to unit for pump D/C and Neulasta on-body injector. Port flushed per protocol and Neulasta applied. Tolerated well. Education provided and patient ambulated off unit in NAD.    Problem: Fatigue  Goal: Improved Activity Tolerance  Outcome: Met

## 2023-10-06 ENCOUNTER — LAB VISIT (OUTPATIENT)
Dept: LAB | Facility: HOSPITAL | Age: 68
End: 2023-10-06
Attending: INTERNAL MEDICINE
Payer: MEDICARE

## 2023-10-06 DIAGNOSIS — C17.9 ADENOCARCINOMA OF SMALL BOWEL: ICD-10-CM

## 2023-10-06 DIAGNOSIS — D69.59 CHEMOTHERAPY-INDUCED THROMBOCYTOPENIA: ICD-10-CM

## 2023-10-06 DIAGNOSIS — T45.1X5A CHEMOTHERAPY-INDUCED THROMBOCYTOPENIA: ICD-10-CM

## 2023-10-06 LAB
ALBUMIN SERPL BCP-MCNC: 3.8 G/DL (ref 3.5–5.2)
ALP SERPL-CCNC: 187 U/L (ref 55–135)
ALT SERPL W/O P-5'-P-CCNC: 30 U/L (ref 10–44)
ANION GAP SERPL CALC-SCNC: 8 MMOL/L (ref 8–16)
AST SERPL-CCNC: 28 U/L (ref 10–40)
BASOPHILS # BLD AUTO: 0.03 K/UL (ref 0–0.2)
BASOPHILS NFR BLD: 0.2 % (ref 0–1.9)
BILIRUB SERPL-MCNC: 0.4 MG/DL (ref 0.1–1)
BUN SERPL-MCNC: 14 MG/DL (ref 8–23)
CALCIUM SERPL-MCNC: 9.3 MG/DL (ref 8.7–10.5)
CHLORIDE SERPL-SCNC: 107 MMOL/L (ref 95–110)
CO2 SERPL-SCNC: 24 MMOL/L (ref 23–29)
CREAT SERPL-MCNC: 0.9 MG/DL (ref 0.5–1.4)
DIFFERENTIAL METHOD: ABNORMAL
EOSINOPHIL # BLD AUTO: 0.2 K/UL (ref 0–0.5)
EOSINOPHIL NFR BLD: 1.4 % (ref 0–8)
ERYTHROCYTE [DISTWIDTH] IN BLOOD BY AUTOMATED COUNT: 15.3 % (ref 11.5–14.5)
EST. GFR  (NO RACE VARIABLE): >60 ML/MIN/1.73 M^2
FERRITIN SERPL-MCNC: 604 NG/ML (ref 20–300)
GLUCOSE SERPL-MCNC: 104 MG/DL (ref 70–110)
HCT VFR BLD AUTO: 38.2 % (ref 40–54)
HGB BLD-MCNC: 12.8 G/DL (ref 14–18)
IMM GRANULOCYTES # BLD AUTO: 0.14 K/UL (ref 0–0.04)
IMM GRANULOCYTES NFR BLD AUTO: 0.9 % (ref 0–0.5)
IRON SERPL-MCNC: 43 UG/DL (ref 45–160)
LYMPHOCYTES # BLD AUTO: 1.4 K/UL (ref 1–4.8)
LYMPHOCYTES NFR BLD: 9.4 % (ref 18–48)
MAGNESIUM SERPL-MCNC: 1.7 MG/DL (ref 1.6–2.6)
MCH RBC QN AUTO: 31.8 PG (ref 27–31)
MCHC RBC AUTO-ENTMCNC: 33.5 G/DL (ref 32–36)
MCV RBC AUTO: 95 FL (ref 82–98)
MONOCYTES # BLD AUTO: 2.1 K/UL (ref 0.3–1)
MONOCYTES NFR BLD: 13.9 % (ref 4–15)
NEUTROPHILS # BLD AUTO: 11.3 K/UL (ref 1.8–7.7)
NEUTROPHILS NFR BLD: 74.2 % (ref 38–73)
NRBC BLD-RTO: 0 /100 WBC
PLATELET # BLD AUTO: 110 K/UL (ref 150–450)
PMV BLD AUTO: 8.9 FL (ref 9.2–12.9)
POTASSIUM SERPL-SCNC: 4 MMOL/L (ref 3.5–5.1)
PROT SERPL-MCNC: 7.4 G/DL (ref 6–8.4)
RBC # BLD AUTO: 4.03 M/UL (ref 4.6–6.2)
SATURATED IRON: 11 % (ref 20–50)
SODIUM SERPL-SCNC: 139 MMOL/L (ref 136–145)
TOTAL IRON BINDING CAPACITY: 377 UG/DL (ref 250–450)
TRANSFERRIN SERPL-MCNC: 255 MG/DL (ref 200–375)
WBC # BLD AUTO: 15.18 K/UL (ref 3.9–12.7)

## 2023-10-06 PROCEDURE — 36415 COLL VENOUS BLD VENIPUNCTURE: CPT | Performed by: INTERNAL MEDICINE

## 2023-10-06 PROCEDURE — 83540 ASSAY OF IRON: CPT | Performed by: INTERNAL MEDICINE

## 2023-10-06 PROCEDURE — 83735 ASSAY OF MAGNESIUM: CPT | Performed by: INTERNAL MEDICINE

## 2023-10-06 PROCEDURE — 85025 COMPLETE CBC W/AUTO DIFF WBC: CPT | Performed by: INTERNAL MEDICINE

## 2023-10-06 PROCEDURE — 84466 ASSAY OF TRANSFERRIN: CPT | Performed by: INTERNAL MEDICINE

## 2023-10-06 PROCEDURE — 80053 COMPREHEN METABOLIC PANEL: CPT | Performed by: INTERNAL MEDICINE

## 2023-10-06 PROCEDURE — 82728 ASSAY OF FERRITIN: CPT | Performed by: INTERNAL MEDICINE

## 2023-10-09 ENCOUNTER — OFFICE VISIT (OUTPATIENT)
Dept: HEMATOLOGY/ONCOLOGY | Facility: CLINIC | Age: 68
End: 2023-10-09
Payer: MEDICARE

## 2023-10-09 VITALS
BODY MASS INDEX: 25.96 KG/M2 | SYSTOLIC BLOOD PRESSURE: 128 MMHG | HEART RATE: 73 BPM | OXYGEN SATURATION: 98 % | DIASTOLIC BLOOD PRESSURE: 78 MMHG | WEIGHT: 208.75 LBS | HEIGHT: 75 IN

## 2023-10-09 DIAGNOSIS — R91.8 PULMONARY NODULES: ICD-10-CM

## 2023-10-09 DIAGNOSIS — D84.821 IMMUNODEFICIENCY SECONDARY TO CHEMOTHERAPY: ICD-10-CM

## 2023-10-09 DIAGNOSIS — Z79.899 IMMUNODEFICIENCY SECONDARY TO CHEMOTHERAPY: ICD-10-CM

## 2023-10-09 DIAGNOSIS — C17.9 ADENOCARCINOMA OF SMALL BOWEL: Primary | ICD-10-CM

## 2023-10-09 DIAGNOSIS — G62.0 CHEMOTHERAPY-INDUCED NEUROPATHY: ICD-10-CM

## 2023-10-09 DIAGNOSIS — R76.8 HEPATITIS B CORE ANTIBODY POSITIVE: ICD-10-CM

## 2023-10-09 DIAGNOSIS — T45.1X5A CHEMOTHERAPY-INDUCED NEUROPATHY: ICD-10-CM

## 2023-10-09 DIAGNOSIS — T45.1X5A CHEMOTHERAPY-INDUCED THROMBOCYTOPENIA: ICD-10-CM

## 2023-10-09 DIAGNOSIS — T45.1X5A IMMUNODEFICIENCY SECONDARY TO CHEMOTHERAPY: ICD-10-CM

## 2023-10-09 DIAGNOSIS — E04.1 THYROID NODULE: ICD-10-CM

## 2023-10-09 DIAGNOSIS — D69.59 CHEMOTHERAPY-INDUCED THROMBOCYTOPENIA: ICD-10-CM

## 2023-10-09 PROCEDURE — 4010F ACE/ARB THERAPY RXD/TAKEN: CPT | Mod: CPTII,S$GLB,, | Performed by: INTERNAL MEDICINE

## 2023-10-09 PROCEDURE — 3008F PR BODY MASS INDEX (BMI) DOCUMENTED: ICD-10-PCS | Mod: CPTII,S$GLB,, | Performed by: INTERNAL MEDICINE

## 2023-10-09 PROCEDURE — 3078F DIAST BP <80 MM HG: CPT | Mod: CPTII,S$GLB,, | Performed by: INTERNAL MEDICINE

## 2023-10-09 PROCEDURE — 3288F FALL RISK ASSESSMENT DOCD: CPT | Mod: CPTII,S$GLB,, | Performed by: INTERNAL MEDICINE

## 2023-10-09 PROCEDURE — 3074F SYST BP LT 130 MM HG: CPT | Mod: CPTII,S$GLB,, | Performed by: INTERNAL MEDICINE

## 2023-10-09 PROCEDURE — 99214 PR OFFICE/OUTPT VISIT, EST, LEVL IV, 30-39 MIN: ICD-10-PCS | Mod: S$GLB,,, | Performed by: INTERNAL MEDICINE

## 2023-10-09 PROCEDURE — 99999 PR PBB SHADOW E&M-EST. PATIENT-LVL IV: CPT | Mod: PBBFAC,,, | Performed by: INTERNAL MEDICINE

## 2023-10-09 PROCEDURE — 1126F PR PAIN SEVERITY QUANTIFIED, NO PAIN PRESENT: ICD-10-PCS | Mod: CPTII,S$GLB,, | Performed by: INTERNAL MEDICINE

## 2023-10-09 PROCEDURE — 1126F AMNT PAIN NOTED NONE PRSNT: CPT | Mod: CPTII,S$GLB,, | Performed by: INTERNAL MEDICINE

## 2023-10-09 PROCEDURE — 99999 PR PBB SHADOW E&M-EST. PATIENT-LVL IV: ICD-10-PCS | Mod: PBBFAC,,, | Performed by: INTERNAL MEDICINE

## 2023-10-09 PROCEDURE — 3288F PR FALLS RISK ASSESSMENT DOCUMENTED: ICD-10-PCS | Mod: CPTII,S$GLB,, | Performed by: INTERNAL MEDICINE

## 2023-10-09 PROCEDURE — 3074F PR MOST RECENT SYSTOLIC BLOOD PRESSURE < 130 MM HG: ICD-10-PCS | Mod: CPTII,S$GLB,, | Performed by: INTERNAL MEDICINE

## 2023-10-09 PROCEDURE — 1159F MED LIST DOCD IN RCRD: CPT | Mod: CPTII,S$GLB,, | Performed by: INTERNAL MEDICINE

## 2023-10-09 PROCEDURE — 3078F PR MOST RECENT DIASTOLIC BLOOD PRESSURE < 80 MM HG: ICD-10-PCS | Mod: CPTII,S$GLB,, | Performed by: INTERNAL MEDICINE

## 2023-10-09 PROCEDURE — 1101F PR PT FALLS ASSESS DOC 0-1 FALLS W/OUT INJ PAST YR: ICD-10-PCS | Mod: CPTII,S$GLB,, | Performed by: INTERNAL MEDICINE

## 2023-10-09 PROCEDURE — 3008F BODY MASS INDEX DOCD: CPT | Mod: CPTII,S$GLB,, | Performed by: INTERNAL MEDICINE

## 2023-10-09 PROCEDURE — 1101F PT FALLS ASSESS-DOCD LE1/YR: CPT | Mod: CPTII,S$GLB,, | Performed by: INTERNAL MEDICINE

## 2023-10-09 PROCEDURE — 1159F PR MEDICATION LIST DOCUMENTED IN MEDICAL RECORD: ICD-10-PCS | Mod: CPTII,S$GLB,, | Performed by: INTERNAL MEDICINE

## 2023-10-09 PROCEDURE — 4010F PR ACE/ARB THEARPY RXD/TAKEN: ICD-10-PCS | Mod: CPTII,S$GLB,, | Performed by: INTERNAL MEDICINE

## 2023-10-09 PROCEDURE — 99214 OFFICE O/P EST MOD 30 MIN: CPT | Mod: S$GLB,,, | Performed by: INTERNAL MEDICINE

## 2023-10-09 RX ORDER — SODIUM CHLORIDE 0.9 % (FLUSH) 0.9 %
10 SYRINGE (ML) INJECTION
Status: CANCELLED | OUTPATIENT
Start: 2023-10-11

## 2023-10-09 RX ORDER — EPINEPHRINE 0.3 MG/.3ML
0.3 INJECTION SUBCUTANEOUS ONCE AS NEEDED
Status: CANCELLED | OUTPATIENT
Start: 2023-10-11

## 2023-10-09 RX ORDER — HEPARIN 100 UNIT/ML
500 SYRINGE INTRAVENOUS
Status: CANCELLED | OUTPATIENT
Start: 2023-10-13

## 2023-10-09 RX ORDER — HEPARIN 100 UNIT/ML
500 SYRINGE INTRAVENOUS
Status: CANCELLED | OUTPATIENT
Start: 2023-10-11

## 2023-10-09 RX ORDER — SODIUM CHLORIDE 0.9 % (FLUSH) 0.9 %
10 SYRINGE (ML) INJECTION
Status: CANCELLED | OUTPATIENT
Start: 2023-10-13

## 2023-10-09 RX ORDER — DIPHENHYDRAMINE HYDROCHLORIDE 50 MG/ML
50 INJECTION INTRAMUSCULAR; INTRAVENOUS ONCE AS NEEDED
Status: CANCELLED | OUTPATIENT
Start: 2023-10-11

## 2023-10-09 RX ORDER — FLUOROURACIL 50 MG/ML
2400 INJECTION, SOLUTION INTRAVENOUS
Status: CANCELLED | OUTPATIENT
Start: 2023-10-11

## 2023-10-09 RX ORDER — FLUOROURACIL 50 MG/ML
400 INJECTION, SOLUTION INTRAVENOUS
Status: CANCELLED | OUTPATIENT
Start: 2023-10-11

## 2023-10-09 NOTE — Clinical Note
Chemo today dose reduced  Cbc,cmp, mag  prior to chemo 2 wks and  and f/u  1mo Add CEA to f/u labs in 1 mo

## 2023-10-09 NOTE — PROGRESS NOTES
Subjective     Patient ID: Poli Young is a 67 y.o. male.    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 Heunderwent work up for anemia and was found to have a small bowel mass about 100cm from IC valve by enteroscopy by Dr Cruz. Got one blood transfusion. He reports history of left central intermittent abdominal pain for several months. No NV. Was found to be anemic on bloodwork. EGD in June 2022 did not show any obvious cause of bleeding.  Capsule endoscopy in January showed blood in small bowel. Enteroscopy showed mass in March that was biopsied and tattooed. Biopsy positive for adenocarcinoma. He is status post small bowel resection on 4/4/23. Pathology positive for  Invasive adenocarcinoma with partial mucinous component (40% of tumor) Lymphovascular invasion is present 17 lymph nodes, 3 positive for metastatic carcinoma (3/17) .Resection margins free of tumor  Pathologic stage pT3 pN Category:  pN2 . Today , he is doing well. Denies dizziness, weakness. No CP, SOB.Appetite improving postop.CT a/p w/contrast 4/13/23 shows Postoperative changes from partial small-bowel resection for a small bowel tumor . No evidence of distant metastases.      Interval Hx: Doing well  S/p C9 completed 9/29/23  No N/V  No fatigue  Minor intermittent tingling/numbness in feet  Remains on gabapetin  No SOB/CP    Workup for abnormal transaminases shows positive Hep B core ab   Taking Vemlidy     Sochx: Retired . Never smoked. Drinks etoh occasionally.   .       Past Medical History:   Diagnosis Date    Hyperlipemia     Hypertension        Past Surgical History:   Procedure Laterality Date    DIAGNOSTIC LAPAROSCOPY N/A 4/4/2023    Procedure: LAPAROSCOPY, DIAGNOSTIC;  Surgeon: Tyrone Martínez MD;   Location: [facility] OR;  Service: General;  Laterality: N/A;    ESOPHAGOGASTRODUODENOSCOPY N/A 6/8/2022    Procedure: EGD (ESOPHAGOGASTRODUODENOSCOPY);  Surgeon: Aamir Rushing MD;  Location: [facility];  Service: Endoscopy;  Laterality: N/A;    EXCISION, SMALL INTESTINE N/A 4/4/2023    Procedure: EXCISION, SMALL INTESTINE/SMALL BOWEL RESECTION;  Surgeon: Tyrone Martínez MD;  Location: [facility] OR;  Service: General;  Laterality: N/A;    INSERTION OF VENOUS ACCESS PORT Right 5/2/2023    Procedure: INSERTION, VENOUS ACCESS PORT;  Surgeon: Tyrone Martínez MD;  Location: [facility] OR;  Service: General;  Laterality: Right;    INTRALUMINAL GASTROINTESTINAL TRACT IMAGING VIA CAPSULE N/A 1/20/2023    Procedure: IMAGING PROCEDURE, GI TRACT, INTRALUMINAL, VIA CAPSULE;  Surgeon: Aamir Rushing MD;  Location: [facility];  Service: Endoscopy;  Laterality: N/A;    LAPAROTOMY, EXPLORATORY N/A 4/4/2023    Procedure: LAPAROTOMY, EXPLORATORY;  Surgeon: Tyrone Martínez MD;  Location: [facility];  Service: General;  Laterality: N/A;    SMALL BOWEL ENTEROSCOPY N/A 3/20/2023    Procedure: ENTEROSCOPY Upper DBE;  Surgeon: Giancarlo Cruz MD;  Location: [facility];  Service: Endoscopy;  Laterality: N/A;        Social History     Tobacco Use    Smoking status: Never    Smokeless tobacco: Never   Substance Use Topics    Alcohol use: Yes     Comment: daily use for years, stopped    Drug use: Never        Review of Systems   Constitutional:  Positive for fatigue. Negative for appetite change, fever and unexpected weight change.   HENT:  Negative for mouth sores.    Eyes:  Negative for visual disturbance.   Respiratory:  Negative for cough and shortness of breath.    Cardiovascular:  Negative for chest pain.   Gastrointestinal:  Negative for abdominal pain and diarrhea.   Genitourinary:  Negative for frequency.   Musculoskeletal:  Negative for back pain.   Integumentary:  Negative for rash.   Neurological:  Positive for numbness. Negative for  "headaches.   Hematological:  Negative for adenopathy.   Psychiatric/Behavioral:  The patient is not nervous/anxious.           Objective       Vitals:    10/09/23 0844   BP: 128/78   Pulse: 73   SpO2: 98%   Weight: 94.7 kg (208 lb 12.4 oz)   Height: 6' 3" (1.905 m)               Physical Exam  Constitutional:       Appearance: He is well-developed.   HENT:      Head: Normocephalic.      Mouth/Throat:      Pharynx: No oropharyngeal exudate.   Eyes:      General: No scleral icterus.        Right eye: No discharge.         Left eye: No discharge.      Conjunctiva/sclera: Conjunctivae normal.   Neck:      Thyroid: No thyromegaly.   Cardiovascular:      Rate and Rhythm: Normal rate and regular rhythm.      Heart sounds: Normal heart sounds. No murmur heard.  Pulmonary:      Effort: Pulmonary effort is normal.      Breath sounds: Normal breath sounds. No wheezing or rales.   Abdominal:      General: Bowel sounds are normal.      Palpations: Abdomen is soft.      Tenderness: There is no abdominal tenderness. There is no guarding or rebound.   Musculoskeletal:         General: No swelling. Normal range of motion.      Cervical back: Normal range of motion and neck supple.   Lymphadenopathy:      Cervical: No cervical adenopathy.      Upper Body:      Right upper body: No supraclavicular adenopathy.      Left upper body: No supraclavicular adenopathy.   Skin:     Findings: No erythema or rash.   Neurological:      Mental Status: He is alert and oriented to person, place, and time.      Cranial Nerves: No cranial nerve deficit.   Psychiatric:         Mood and Affect: Mood normal.         Behavior: Behavior normal.              Pathology 4/4/23  Small intestine, segmental resection:   - Invasive adenocarcinoma with partial mucinous component (40% of tumor)       - Tumor invades through muscularis propria into subserosa   - Lymphovascular invasion is present   - 17 lymph nodes, 3 positive for metastatic carcinoma (3/17)   - " Resection margins free of tumor   - Background small intestinal mucosa without significant histopathologic alteration   - See CAP Tumor synoptic and comment     CAP Tumor Synoptic:   Procedure:  Segmental resection   Tumor site:  Small intestine, not otherwise specified   Histologic type:  Adenocarcinoma (not otherwise characterized), with mucinous component comprising approximately 40% of tumor   Histologic grade:  G2, moderately differentiated   Tumor size:  5.5 cm in greatest dimension   Tumor extent:  Invades through muscularis propria into subserosa without serosal penetration   Lymphovascular invasion:  Present   Macroscopic tumor perforation:  Not identified   Margin status for invasive carcinoma:  All margins negative for invasive carcinoma   Margin status for dysplasia:  All margins negative for carcinoma in situ (high-grade dysplasia)/ adenoma   Regional lymph node status:  Tumor present in regional lymph nodes   Number of lymph nodes with tumor:  3   Number of lymph nodes examined:  17   PATHOLOGIC STAGE CLASSIFICATION   TNM descriptors:  Not applicable   pT Category:  pT3   pN Category:  pN2   Special studies:  Intact expression of DNA mismatch repair proteins (MLH1, MSH2, MSH6, PMS2) in tumor by immunohistochemistry (performed on prior biopsy KES-, collected 3/20/2023); interpreted by Dr. Garcias)   Comment:  Records from this patient's reported recent colonoscopy are not available for review.       CT a/p w/contrast 4/13/23  Impression:     Postoperative changes from partial small-bowel resection for a small bowel tumor.  Pathology results are pending.     Skin thickening and subcutaneous edema near the umbilicus, which could be inflammatory or infectious.  No organized fluid collections along the surgical incision.     Dilated small bowel loops with gradual tapering distally, likely ileus in the early postoperative setting.     Small volume ascites and trace pneumoperitoneum, likely  postoperative.  Anastomotic leak is less likely.  No organized intraperitoneal collections.     Other findings as described.        CT chest w/out contrast  5/1/23   Impression:     Asymmetric thyroid gland with possible left lobe thyroid nodule measuring up to 2.2 cm.  Further evaluation with thyroid ultrasound examination is recommended.     Mild centrilobular emphysematous changes.     Mucous plugging within the left upper lobe laterally and within the right lower lobe laterally.     Cluster of pulmonary nodules within the upper left upper lobe measuring up to 0.6 cm in size.  These are likely infectious/inflammatory in etiology, however continued surveillance is recommended in this patient with history of malignancy.     Hepatic cysts.         CT c/a/p 7/3/2023   Impression:     1. Postsurgical changes compatible with partial small bowel resection with enteroenteric anastomosis and surgical sutures in the region left upper quadrant, not significantly changed.  2. Near complete interval resolution of previously noted cluster of pulmonary nodules in the left upper lobe.  Cluster of approximately 3 ground-glass pulmonary micro nodules in the left upper lobe and, second cluster of approximately 3 ground-glass pulmonary micro nodules in the right lower lobe, not significantly changed.  A few borderline enlarged mediastinal lymph nodes, not significantly changed.  3. Multiple simple appearing hepatic parenchymal cyst and multiple additional subcentimeter hypodensities that are too small to accurately characterize, not significantly changed.  4. 0.4-cm parenchymal hypodensity in the pancreatic head, not significantly changed.  5. Simple appearing renal cortical cystic lesion in the lateral aspect of the right midpole, not significantly changed. Multiple additional subcentimeter renal cortical hypodensities bilaterally are too small to accurately characterize but not significantly changed when compared to  prior.        Lab Results   Component Value Date    WBC 15.18 (H) 10/06/2023    HGB 12.8 (L) 10/06/2023    HCT 38.2 (L) 10/06/2023    MCV 95 10/06/2023     (L) 10/06/2023        Latest Reference Range & Units 07/24/23 08:30   CEA 0.0 - 5.0 ng/mL 2.4       Hep B Core Total Ab Non-reactive Reactive Abnormal          Hepatitis B Surface Ag Non-reactive Non-reactive    Hep B C IgM Non-reactive Non-reactive    Hep A IgM Non-reactive Non-reactive    Hepatitis C Ab Non-reactive Non-reactive          Assessment and Plan     Problem List Items Addressed This Visit           Diagnoses and all orders for this visit:    Adenocarcinoma of small bowel  Poli Young is a 67 y.o. male with biopsy proven small bowel adenocarcinoma now status post small bowel resection on 4/4/23.pT3 pN2M0  CT a/p no evidence of distant mets  Adjuvant chemotherapy for 6 mo is recommended for patients with node-positive, completely resected disease. The regimens of choice are CAPOX (capecitabine and oxaliplatin) and FOLFOX (folinic acid, fluorouracil, and oxaliplatin).    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  Cluster of approximately 3 ground-glass pulmonary micro nodules in the left upper lobe and, second cluster of approximately 3 ground-glass pulmonary micro nodules in the right lower lobe, not significantly changed.  A few borderline enlarged mediastinal lymph nodes, not significantly changed.  Chemotherapy-induced neuropathy  Cont neuropathy cream   Cont gabapentin  300mg po qhs       Chemotherapy induced  thrombocytopenia  Plt ct 110k  Asymptomatic   monitor     Antineoplastic chemotherapy induced anemia  Hb 12.8g/dl  Check Fe studies  Monitor          Proceed with chemo on 10/11/23  Cbc,cmp, mag  prior to chemo 2 wks and    Cbc,cmp, CEA  prior to f/u 1mo prior to his last chemo on 11/8/23        I spent a total of  40  minutes on the day of the visit.This includes face to face time and non-face to face time preparing to see the patient (eg, review of tests), obtaining and/or reviewing separately obtained history, documenting clinical information in the electronic or other health record, independently interpreting results and communicating results to the patient/family/caregiver, and coordinating care.            Amparo Michaels MD  [facility]

## 2023-10-09 NOTE — Clinical Note
PLEASE COORDINATE FOLLOW UPS WITH CHEMO  PT NEEDS TO F/U IN 1MONTH PRIOR TO HIS LAST CHEMO CBC,CMP PRIOR TO CHEMO IN 2WEEKS CBC,CMP, CEA, MAG PRIOR TO CHEMO ON 11/8/23

## 2023-10-11 ENCOUNTER — INFUSION (OUTPATIENT)
Dept: INFUSION THERAPY | Facility: HOSPITAL | Age: 68
End: 2023-10-11
Attending: INTERNAL MEDICINE
Payer: MEDICARE

## 2023-10-11 VITALS
TEMPERATURE: 98 F | DIASTOLIC BLOOD PRESSURE: 71 MMHG | SYSTOLIC BLOOD PRESSURE: 122 MMHG | HEART RATE: 75 BPM | RESPIRATION RATE: 14 BRPM | OXYGEN SATURATION: 99 %

## 2023-10-11 DIAGNOSIS — C17.9 ADENOCARCINOMA OF SMALL BOWEL: Primary | ICD-10-CM

## 2023-10-11 PROCEDURE — 25000003 PHARM REV CODE 250: Performed by: INTERNAL MEDICINE

## 2023-10-11 PROCEDURE — 96368 THER/DIAG CONCURRENT INF: CPT

## 2023-10-11 PROCEDURE — 96411 CHEMO IV PUSH ADDL DRUG: CPT

## 2023-10-11 PROCEDURE — 96416 CHEMO PROLONG INFUSE W/PUMP: CPT

## 2023-10-11 PROCEDURE — 96367 TX/PROPH/DG ADDL SEQ IV INF: CPT

## 2023-10-11 PROCEDURE — 96415 CHEMO IV INFUSION ADDL HR: CPT

## 2023-10-11 PROCEDURE — 96413 CHEMO IV INFUSION 1 HR: CPT

## 2023-10-11 PROCEDURE — 63600175 PHARM REV CODE 636 W HCPCS: Performed by: INTERNAL MEDICINE

## 2023-10-11 RX ORDER — FLUOROURACIL 50 MG/ML
400 INJECTION, SOLUTION INTRAVENOUS
Status: COMPLETED | OUTPATIENT
Start: 2023-10-11 | End: 2023-10-11

## 2023-10-11 RX ADMIN — FLUOROURACIL 855 MG: 50 INJECTION, SOLUTION INTRAVENOUS at 12:10

## 2023-10-11 RX ADMIN — OXALIPLATIN 140 MG: 5 INJECTION, SOLUTION INTRAVENOUS at 10:10

## 2023-10-11 RX ADMIN — LEUCOVORIN CALCIUM 855 MG: 500 INJECTION, POWDER, LYOPHILIZED, FOR SOLUTION INTRAMUSCULAR; INTRAVENOUS at 10:10

## 2023-10-11 RX ADMIN — DEXAMETHASONE SODIUM PHOSPHATE 0.25 MG: 10 INJECTION, SOLUTION INTRAMUSCULAR; INTRAVENOUS at 09:10

## 2023-10-11 RX ADMIN — FLUOROURACIL 5000 MG: 50 INJECTION, SOLUTION INTRAVENOUS at 12:10

## 2023-10-11 NOTE — PLAN OF CARE
Patient presented to unit for C10 FOLFOX chemo infusion. Labs reviewed and ok to continue with treatment plan today. VSS. Pre-medications of Aloxi/Dex administered IVPB. Oxaliplatin and Leucovorin administered concurrently over 2 hours. Fluorouracil injection administered IVP over 5 mins. Fluorouracil chemo ambulatory pump connected at 1230. No new or worsening symptoms reported. Patient discharged from unit in NAD.

## 2023-10-12 DIAGNOSIS — C17.9 ADENOCARCINOMA OF SMALL BOWEL: Primary | ICD-10-CM

## 2023-10-13 ENCOUNTER — INFUSION (OUTPATIENT)
Dept: INFUSION THERAPY | Facility: HOSPITAL | Age: 68
End: 2023-10-13
Attending: INTERNAL MEDICINE
Payer: MEDICARE

## 2023-10-13 VITALS
OXYGEN SATURATION: 97 % | SYSTOLIC BLOOD PRESSURE: 145 MMHG | RESPIRATION RATE: 18 BRPM | DIASTOLIC BLOOD PRESSURE: 74 MMHG | TEMPERATURE: 98 F | HEART RATE: 77 BPM

## 2023-10-13 DIAGNOSIS — C17.9 ADENOCARCINOMA OF SMALL BOWEL: Primary | ICD-10-CM

## 2023-10-13 PROCEDURE — A4216 STERILE WATER/SALINE, 10 ML: HCPCS | Performed by: INTERNAL MEDICINE

## 2023-10-13 PROCEDURE — 25000003 PHARM REV CODE 250: Performed by: INTERNAL MEDICINE

## 2023-10-13 PROCEDURE — 63600175 PHARM REV CODE 636 W HCPCS: Mod: JZ,JG | Performed by: INTERNAL MEDICINE

## 2023-10-13 PROCEDURE — 96377 APPLICATON ON-BODY INJECTOR: CPT

## 2023-10-13 RX ORDER — SODIUM CHLORIDE 0.9 % (FLUSH) 0.9 %
10 SYRINGE (ML) INJECTION
Status: DISCONTINUED | OUTPATIENT
Start: 2023-10-13 | End: 2023-10-13 | Stop reason: HOSPADM

## 2023-10-13 RX ORDER — HEPARIN 100 UNIT/ML
500 SYRINGE INTRAVENOUS
Status: DISCONTINUED | OUTPATIENT
Start: 2023-10-13 | End: 2023-10-13 | Stop reason: HOSPADM

## 2023-10-13 RX ADMIN — PEGFILGRASTIM 6 MG: KIT SUBCUTANEOUS at 10:10

## 2023-10-13 RX ADMIN — HEPARIN 500 UNITS: 100 SYRINGE at 10:10

## 2023-10-13 RX ADMIN — Medication 10 ML: at 10:10

## 2023-10-13 NOTE — PLAN OF CARE
Patient presents for pump d/c and Neulasta OBI. VSS. No reports of new or worsening symptoms. Port with blood return, flushed with normal saline and heparin locked. OBI applied and instructions given. Ambulatory and in NAD upon discharge from unit.    Problem: Fatigue  Goal: Improved Activity Tolerance  Outcome: Ongoing, Progressing

## 2023-10-24 ENCOUNTER — LAB VISIT (OUTPATIENT)
Dept: LAB | Facility: HOSPITAL | Age: 68
End: 2023-10-24
Attending: INTERNAL MEDICINE
Payer: MEDICARE

## 2023-10-24 DIAGNOSIS — C17.9 ADENOCARCINOMA OF SMALL BOWEL: ICD-10-CM

## 2023-10-24 LAB
ALBUMIN SERPL BCP-MCNC: 3.7 G/DL (ref 3.5–5.2)
ALP SERPL-CCNC: 172 U/L (ref 55–135)
ALT SERPL W/O P-5'-P-CCNC: 30 U/L (ref 10–44)
ANION GAP SERPL CALC-SCNC: 7 MMOL/L (ref 8–16)
AST SERPL-CCNC: 31 U/L (ref 10–40)
BASOPHILS # BLD AUTO: 0.03 K/UL (ref 0–0.2)
BASOPHILS NFR BLD: 0.5 % (ref 0–1.9)
BILIRUB SERPL-MCNC: 0.4 MG/DL (ref 0.1–1)
BUN SERPL-MCNC: 16 MG/DL (ref 8–23)
CALCIUM SERPL-MCNC: 9 MG/DL (ref 8.7–10.5)
CHLORIDE SERPL-SCNC: 110 MMOL/L (ref 95–110)
CO2 SERPL-SCNC: 23 MMOL/L (ref 23–29)
CREAT SERPL-MCNC: 1.1 MG/DL (ref 0.5–1.4)
DIFFERENTIAL METHOD: ABNORMAL
EOSINOPHIL # BLD AUTO: 0.1 K/UL (ref 0–0.5)
EOSINOPHIL NFR BLD: 1.1 % (ref 0–8)
ERYTHROCYTE [DISTWIDTH] IN BLOOD BY AUTOMATED COUNT: 14.9 % (ref 11.5–14.5)
EST. GFR  (NO RACE VARIABLE): >60 ML/MIN/1.73 M^2
GLUCOSE SERPL-MCNC: 116 MG/DL (ref 70–110)
HCT VFR BLD AUTO: 37.6 % (ref 40–54)
HGB BLD-MCNC: 12.5 G/DL (ref 14–18)
IMM GRANULOCYTES # BLD AUTO: 0.13 K/UL (ref 0–0.04)
IMM GRANULOCYTES NFR BLD AUTO: 2 % (ref 0–0.5)
LYMPHOCYTES # BLD AUTO: 0.8 K/UL (ref 1–4.8)
LYMPHOCYTES NFR BLD: 12.9 % (ref 18–48)
MAGNESIUM SERPL-MCNC: 1.6 MG/DL (ref 1.6–2.6)
MCH RBC QN AUTO: 31.9 PG (ref 27–31)
MCHC RBC AUTO-ENTMCNC: 33.2 G/DL (ref 32–36)
MCV RBC AUTO: 96 FL (ref 82–98)
MONOCYTES # BLD AUTO: 0.9 K/UL (ref 0.3–1)
MONOCYTES NFR BLD: 14.1 % (ref 4–15)
NEUTROPHILS # BLD AUTO: 4.4 K/UL (ref 1.8–7.7)
NEUTROPHILS NFR BLD: 69.4 % (ref 38–73)
NRBC BLD-RTO: 0 /100 WBC
PLATELET # BLD AUTO: 83 K/UL (ref 150–450)
PMV BLD AUTO: 8.7 FL (ref 9.2–12.9)
POTASSIUM SERPL-SCNC: 3.6 MMOL/L (ref 3.5–5.1)
PROT SERPL-MCNC: 7 G/DL (ref 6–8.4)
RBC # BLD AUTO: 3.92 M/UL (ref 4.6–6.2)
SODIUM SERPL-SCNC: 140 MMOL/L (ref 136–145)
WBC # BLD AUTO: 6.37 K/UL (ref 3.9–12.7)

## 2023-10-24 PROCEDURE — 85025 COMPLETE CBC W/AUTO DIFF WBC: CPT | Performed by: INTERNAL MEDICINE

## 2023-10-24 PROCEDURE — 36415 COLL VENOUS BLD VENIPUNCTURE: CPT | Performed by: INTERNAL MEDICINE

## 2023-10-24 PROCEDURE — 83735 ASSAY OF MAGNESIUM: CPT | Performed by: INTERNAL MEDICINE

## 2023-10-24 PROCEDURE — 80053 COMPREHEN METABOLIC PANEL: CPT | Performed by: INTERNAL MEDICINE

## 2023-10-30 ENCOUNTER — INFUSION (OUTPATIENT)
Dept: INFUSION THERAPY | Facility: HOSPITAL | Age: 68
End: 2023-10-30
Attending: INTERNAL MEDICINE
Payer: MEDICARE

## 2023-10-30 ENCOUNTER — LAB VISIT (OUTPATIENT)
Dept: LAB | Facility: HOSPITAL | Age: 68
End: 2023-10-30
Attending: INTERNAL MEDICINE
Payer: MEDICARE

## 2023-10-30 VITALS
HEIGHT: 75 IN | HEART RATE: 72 BPM | RESPIRATION RATE: 18 BRPM | TEMPERATURE: 98 F | DIASTOLIC BLOOD PRESSURE: 76 MMHG | BODY MASS INDEX: 25.54 KG/M2 | WEIGHT: 205.38 LBS | SYSTOLIC BLOOD PRESSURE: 138 MMHG | OXYGEN SATURATION: 96 %

## 2023-10-30 DIAGNOSIS — C17.9 ADENOCARCINOMA OF SMALL BOWEL: ICD-10-CM

## 2023-10-30 DIAGNOSIS — C17.9 ADENOCARCINOMA OF SMALL BOWEL: Primary | ICD-10-CM

## 2023-10-30 LAB
ALBUMIN SERPL BCP-MCNC: 3.7 G/DL (ref 3.5–5.2)
ALP SERPL-CCNC: 109 U/L (ref 55–135)
ALT SERPL W/O P-5'-P-CCNC: 34 U/L (ref 10–44)
ANION GAP SERPL CALC-SCNC: 7 MMOL/L (ref 8–16)
AST SERPL-CCNC: 30 U/L (ref 10–40)
BASOPHILS # BLD AUTO: 0.02 K/UL (ref 0–0.2)
BASOPHILS NFR BLD: 0.7 % (ref 0–1.9)
BILIRUB SERPL-MCNC: 0.4 MG/DL (ref 0.1–1)
BUN SERPL-MCNC: 16 MG/DL (ref 8–23)
CALCIUM SERPL-MCNC: 9.3 MG/DL (ref 8.7–10.5)
CHLORIDE SERPL-SCNC: 111 MMOL/L (ref 95–110)
CO2 SERPL-SCNC: 24 MMOL/L (ref 23–29)
CREAT SERPL-MCNC: 0.9 MG/DL (ref 0.5–1.4)
DIFFERENTIAL METHOD: ABNORMAL
EOSINOPHIL # BLD AUTO: 0.1 K/UL (ref 0–0.5)
EOSINOPHIL NFR BLD: 2.7 % (ref 0–8)
ERYTHROCYTE [DISTWIDTH] IN BLOOD BY AUTOMATED COUNT: 15 % (ref 11.5–14.5)
EST. GFR  (NO RACE VARIABLE): >60 ML/MIN/1.73 M^2
GLUCOSE SERPL-MCNC: 124 MG/DL (ref 70–110)
HCT VFR BLD AUTO: 37.6 % (ref 40–54)
HGB BLD-MCNC: 12.5 G/DL (ref 14–18)
IMM GRANULOCYTES # BLD AUTO: 0.03 K/UL (ref 0–0.04)
IMM GRANULOCYTES NFR BLD AUTO: 1 % (ref 0–0.5)
LYMPHOCYTES # BLD AUTO: 1.1 K/UL (ref 1–4.8)
LYMPHOCYTES NFR BLD: 37.1 % (ref 18–48)
MAGNESIUM SERPL-MCNC: 1.7 MG/DL (ref 1.6–2.6)
MCH RBC QN AUTO: 32 PG (ref 27–31)
MCHC RBC AUTO-ENTMCNC: 33.2 G/DL (ref 32–36)
MCV RBC AUTO: 96 FL (ref 82–98)
MONOCYTES # BLD AUTO: 0.4 K/UL (ref 0.3–1)
MONOCYTES NFR BLD: 14 % (ref 4–15)
NEUTROPHILS # BLD AUTO: 1.3 K/UL (ref 1.8–7.7)
NEUTROPHILS NFR BLD: 44.5 % (ref 38–73)
NRBC BLD-RTO: 0 /100 WBC
PLATELET # BLD AUTO: 128 K/UL (ref 150–450)
PMV BLD AUTO: 9.1 FL (ref 9.2–12.9)
POTASSIUM SERPL-SCNC: 3.7 MMOL/L (ref 3.5–5.1)
PROT SERPL-MCNC: 7.1 G/DL (ref 6–8.4)
RBC # BLD AUTO: 3.91 M/UL (ref 4.6–6.2)
SODIUM SERPL-SCNC: 142 MMOL/L (ref 136–145)
WBC # BLD AUTO: 2.99 K/UL (ref 3.9–12.7)

## 2023-10-30 PROCEDURE — 96416 CHEMO PROLONG INFUSE W/PUMP: CPT

## 2023-10-30 PROCEDURE — 96415 CHEMO IV INFUSION ADDL HR: CPT

## 2023-10-30 PROCEDURE — 63600175 PHARM REV CODE 636 W HCPCS: Performed by: INTERNAL MEDICINE

## 2023-10-30 PROCEDURE — 96367 TX/PROPH/DG ADDL SEQ IV INF: CPT

## 2023-10-30 PROCEDURE — 96413 CHEMO IV INFUSION 1 HR: CPT

## 2023-10-30 PROCEDURE — 36415 COLL VENOUS BLD VENIPUNCTURE: CPT | Performed by: INTERNAL MEDICINE

## 2023-10-30 PROCEDURE — 96411 CHEMO IV PUSH ADDL DRUG: CPT

## 2023-10-30 PROCEDURE — 85025 COMPLETE CBC W/AUTO DIFF WBC: CPT | Performed by: INTERNAL MEDICINE

## 2023-10-30 PROCEDURE — 80053 COMPREHEN METABOLIC PANEL: CPT | Performed by: INTERNAL MEDICINE

## 2023-10-30 PROCEDURE — 25000003 PHARM REV CODE 250: Performed by: INTERNAL MEDICINE

## 2023-10-30 PROCEDURE — 83735 ASSAY OF MAGNESIUM: CPT | Performed by: INTERNAL MEDICINE

## 2023-10-30 PROCEDURE — 96368 THER/DIAG CONCURRENT INF: CPT

## 2023-10-30 RX ORDER — FLUOROURACIL 50 MG/ML
400 INJECTION, SOLUTION INTRAVENOUS
Status: COMPLETED | OUTPATIENT
Start: 2023-10-30 | End: 2023-10-30

## 2023-10-30 RX ORDER — DIPHENHYDRAMINE HYDROCHLORIDE 50 MG/ML
50 INJECTION INTRAMUSCULAR; INTRAVENOUS ONCE AS NEEDED
Status: CANCELLED | OUTPATIENT
Start: 2023-10-30

## 2023-10-30 RX ORDER — FLUOROURACIL 50 MG/ML
400 INJECTION, SOLUTION INTRAVENOUS
Status: CANCELLED | OUTPATIENT
Start: 2023-10-30

## 2023-10-30 RX ORDER — HEPARIN 100 UNIT/ML
500 SYRINGE INTRAVENOUS
Status: CANCELLED | OUTPATIENT
Start: 2023-10-30

## 2023-10-30 RX ORDER — SODIUM CHLORIDE 0.9 % (FLUSH) 0.9 %
10 SYRINGE (ML) INJECTION
Status: CANCELLED | OUTPATIENT
Start: 2023-11-01

## 2023-10-30 RX ORDER — EPINEPHRINE 0.3 MG/.3ML
0.3 INJECTION SUBCUTANEOUS ONCE AS NEEDED
Status: CANCELLED | OUTPATIENT
Start: 2023-10-30

## 2023-10-30 RX ORDER — SODIUM CHLORIDE 0.9 % (FLUSH) 0.9 %
10 SYRINGE (ML) INJECTION
Status: CANCELLED | OUTPATIENT
Start: 2023-10-30

## 2023-10-30 RX ORDER — HEPARIN 100 UNIT/ML
500 SYRINGE INTRAVENOUS
Status: CANCELLED | OUTPATIENT
Start: 2023-11-01

## 2023-10-30 RX ORDER — FLUOROURACIL 50 MG/ML
2400 INJECTION, SOLUTION INTRAVENOUS
Status: CANCELLED | OUTPATIENT
Start: 2023-10-30

## 2023-10-30 RX ADMIN — LEUCOVORIN CALCIUM 855 MG: 100 INJECTION, POWDER, LYOPHILIZED, FOR SUSPENSION INTRAMUSCULAR; INTRAVENOUS at 10:10

## 2023-10-30 RX ADMIN — DEXAMETHASONE SODIUM PHOSPHATE 0.25 MG: 10 INJECTION, SOLUTION INTRAMUSCULAR; INTRAVENOUS at 09:10

## 2023-10-30 RX ADMIN — FLUOROURACIL 855 MG: 50 INJECTION, SOLUTION INTRAVENOUS at 12:10

## 2023-10-30 RX ADMIN — OXALIPLATIN 140 MG: 5 INJECTION, SOLUTION INTRAVENOUS at 10:10

## 2023-10-30 RX ADMIN — FLUOROURACIL 5000 MG: 50 INJECTION, SOLUTION INTRAVENOUS at 12:10

## 2023-11-01 ENCOUNTER — INFUSION (OUTPATIENT)
Dept: INFUSION THERAPY | Facility: HOSPITAL | Age: 68
End: 2023-11-01
Attending: INTERNAL MEDICINE
Payer: MEDICARE

## 2023-11-01 VITALS
OXYGEN SATURATION: 96 % | TEMPERATURE: 98 F | HEART RATE: 86 BPM | SYSTOLIC BLOOD PRESSURE: 119 MMHG | BODY MASS INDEX: 26.12 KG/M2 | WEIGHT: 209 LBS | DIASTOLIC BLOOD PRESSURE: 68 MMHG | RESPIRATION RATE: 18 BRPM

## 2023-11-01 DIAGNOSIS — C17.9 ADENOCARCINOMA OF SMALL BOWEL: Primary | ICD-10-CM

## 2023-11-01 PROCEDURE — 96372 THER/PROPH/DIAG INJ SC/IM: CPT

## 2023-11-01 PROCEDURE — A4216 STERILE WATER/SALINE, 10 ML: HCPCS | Performed by: INTERNAL MEDICINE

## 2023-11-01 PROCEDURE — 63600175 PHARM REV CODE 636 W HCPCS: Mod: JZ,JG | Performed by: INTERNAL MEDICINE

## 2023-11-01 PROCEDURE — 25000003 PHARM REV CODE 250: Performed by: INTERNAL MEDICINE

## 2023-11-01 RX ORDER — SODIUM CHLORIDE 0.9 % (FLUSH) 0.9 %
10 SYRINGE (ML) INJECTION
Status: DISCONTINUED | OUTPATIENT
Start: 2023-11-01 | End: 2023-11-01 | Stop reason: HOSPADM

## 2023-11-01 RX ORDER — HEPARIN 100 UNIT/ML
500 SYRINGE INTRAVENOUS
Status: DISCONTINUED | OUTPATIENT
Start: 2023-11-01 | End: 2023-11-01 | Stop reason: HOSPADM

## 2023-11-01 RX ADMIN — Medication 10 ML: at 09:11

## 2023-11-01 RX ADMIN — PEGFILGRASTIM 6 MG: KIT SUBCUTANEOUS at 09:11

## 2023-11-01 RX ADMIN — HEPARIN 500 UNITS: 100 SYRINGE at 09:11

## 2023-11-01 NOTE — NURSING
Patient seen in [facility] infusion center today for pump d/c and Neulasta OBI. Accompanied by family member. VSS. No reports of new or worsening symptoms. Port flushed with NaCL blood return noted, heparin locked. OBI applied and instructions given. Ambulatory and in NAD upon discharge from unit.

## 2023-11-05 NOTE — PLAN OF CARE
Patient presented to unit for C11D1 FOLFOX chemo infusion. VSS. Labs drawn pta. Plts 128k and ANC 1.3. MD Xenia notified.Ok to treat per MD. Port acessed. Pre-medications of Aloxi/Dex administered. Oxalipatin/Leucovorin administered concurrently over 2 hours. 5FU connected at 1230. Pump infusion w/o difficulty at discharge. Patient ambulated off unit in [facility].

## 2023-11-08 ENCOUNTER — OFFICE VISIT (OUTPATIENT)
Dept: HEMATOLOGY/ONCOLOGY | Facility: CLINIC | Age: 68
End: 2023-11-08
Payer: MEDICARE

## 2023-11-08 VITALS
HEART RATE: 72 BPM | BODY MASS INDEX: 25.3 KG/M2 | HEIGHT: 75 IN | WEIGHT: 203.5 LBS | SYSTOLIC BLOOD PRESSURE: 149 MMHG | DIASTOLIC BLOOD PRESSURE: 87 MMHG | OXYGEN SATURATION: 97 %

## 2023-11-08 DIAGNOSIS — G62.0 CHEMOTHERAPY-INDUCED NEUROPATHY: ICD-10-CM

## 2023-11-08 DIAGNOSIS — T45.1X5A IMMUNODEFICIENCY SECONDARY TO CHEMOTHERAPY: ICD-10-CM

## 2023-11-08 DIAGNOSIS — R76.8 HEPATITIS B CORE ANTIBODY POSITIVE: ICD-10-CM

## 2023-11-08 DIAGNOSIS — R91.8 PULMONARY NODULES: ICD-10-CM

## 2023-11-08 DIAGNOSIS — Z79.899 IMMUNODEFICIENCY SECONDARY TO CHEMOTHERAPY: ICD-10-CM

## 2023-11-08 DIAGNOSIS — E04.1 THYROID NODULE: ICD-10-CM

## 2023-11-08 DIAGNOSIS — T45.1X5A CHEMOTHERAPY-INDUCED NEUROPATHY: ICD-10-CM

## 2023-11-08 DIAGNOSIS — D64.81 ANTINEOPLASTIC CHEMOTHERAPY INDUCED ANEMIA: ICD-10-CM

## 2023-11-08 DIAGNOSIS — C17.9 ADENOCARCINOMA OF SMALL BOWEL: Primary | ICD-10-CM

## 2023-11-08 DIAGNOSIS — T45.1X5A ANTINEOPLASTIC CHEMOTHERAPY INDUCED ANEMIA: ICD-10-CM

## 2023-11-08 DIAGNOSIS — D84.821 IMMUNODEFICIENCY SECONDARY TO CHEMOTHERAPY: ICD-10-CM

## 2023-11-08 DIAGNOSIS — T45.1X5A CHEMOTHERAPY-INDUCED THROMBOCYTOPENIA: ICD-10-CM

## 2023-11-08 DIAGNOSIS — D69.59 CHEMOTHERAPY-INDUCED THROMBOCYTOPENIA: ICD-10-CM

## 2023-11-08 PROCEDURE — 1126F PR PAIN SEVERITY QUANTIFIED, NO PAIN PRESENT: ICD-10-PCS | Mod: CPTII,S$GLB,, | Performed by: INTERNAL MEDICINE

## 2023-11-08 PROCEDURE — 3008F PR BODY MASS INDEX (BMI) DOCUMENTED: ICD-10-PCS | Mod: CPTII,S$GLB,, | Performed by: INTERNAL MEDICINE

## 2023-11-08 PROCEDURE — 3008F BODY MASS INDEX DOCD: CPT | Mod: CPTII,S$GLB,, | Performed by: INTERNAL MEDICINE

## 2023-11-08 PROCEDURE — 99214 PR OFFICE/OUTPT VISIT, EST, LEVL IV, 30-39 MIN: ICD-10-PCS | Mod: S$GLB,,, | Performed by: INTERNAL MEDICINE

## 2023-11-08 PROCEDURE — 1159F PR MEDICATION LIST DOCUMENTED IN MEDICAL RECORD: ICD-10-PCS | Mod: CPTII,S$GLB,, | Performed by: INTERNAL MEDICINE

## 2023-11-08 PROCEDURE — 4010F PR ACE/ARB THEARPY RXD/TAKEN: ICD-10-PCS | Mod: CPTII,S$GLB,, | Performed by: INTERNAL MEDICINE

## 2023-11-08 PROCEDURE — 3077F PR MOST RECENT SYSTOLIC BLOOD PRESSURE >= 140 MM HG: ICD-10-PCS | Mod: CPTII,S$GLB,, | Performed by: INTERNAL MEDICINE

## 2023-11-08 PROCEDURE — 3288F PR FALLS RISK ASSESSMENT DOCUMENTED: ICD-10-PCS | Mod: CPTII,S$GLB,, | Performed by: INTERNAL MEDICINE

## 2023-11-08 PROCEDURE — 3077F SYST BP >= 140 MM HG: CPT | Mod: CPTII,S$GLB,, | Performed by: INTERNAL MEDICINE

## 2023-11-08 PROCEDURE — 4010F ACE/ARB THERAPY RXD/TAKEN: CPT | Mod: CPTII,S$GLB,, | Performed by: INTERNAL MEDICINE

## 2023-11-08 PROCEDURE — 99214 OFFICE O/P EST MOD 30 MIN: CPT | Mod: S$GLB,,, | Performed by: INTERNAL MEDICINE

## 2023-11-08 PROCEDURE — 1101F PR PT FALLS ASSESS DOC 0-1 FALLS W/OUT INJ PAST YR: ICD-10-PCS | Mod: CPTII,S$GLB,, | Performed by: INTERNAL MEDICINE

## 2023-11-08 PROCEDURE — 3079F DIAST BP 80-89 MM HG: CPT | Mod: CPTII,S$GLB,, | Performed by: INTERNAL MEDICINE

## 2023-11-08 PROCEDURE — 99999 PR PBB SHADOW E&M-EST. PATIENT-LVL IV: ICD-10-PCS | Mod: PBBFAC,,, | Performed by: INTERNAL MEDICINE

## 2023-11-08 PROCEDURE — 3079F PR MOST RECENT DIASTOLIC BLOOD PRESSURE 80-89 MM HG: ICD-10-PCS | Mod: CPTII,S$GLB,, | Performed by: INTERNAL MEDICINE

## 2023-11-08 PROCEDURE — 3288F FALL RISK ASSESSMENT DOCD: CPT | Mod: CPTII,S$GLB,, | Performed by: INTERNAL MEDICINE

## 2023-11-08 PROCEDURE — 1126F AMNT PAIN NOTED NONE PRSNT: CPT | Mod: CPTII,S$GLB,, | Performed by: INTERNAL MEDICINE

## 2023-11-08 PROCEDURE — 1101F PT FALLS ASSESS-DOCD LE1/YR: CPT | Mod: CPTII,S$GLB,, | Performed by: INTERNAL MEDICINE

## 2023-11-08 PROCEDURE — 99999 PR PBB SHADOW E&M-EST. PATIENT-LVL IV: CPT | Mod: PBBFAC,,, | Performed by: INTERNAL MEDICINE

## 2023-11-08 PROCEDURE — 1159F MED LIST DOCD IN RCRD: CPT | Mod: CPTII,S$GLB,, | Performed by: INTERNAL MEDICINE

## 2023-11-08 NOTE — PROGRESS NOTES
Subjective     Patient ID: Poli Young is a 67 y.o. male.    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 Heunderwent work up for anemia and was found to have a small bowel mass about 100cm from IC valve by enteroscopy by Dr Cruz. Got one blood transfusion. He reports history of left central intermittent abdominal pain for several months. No NV. Was found to be anemic on bloodwork. EGD in June 2022 did not show any obvious cause of bleeding.  Capsule endoscopy in January showed blood in small bowel. Enteroscopy showed mass in March that was biopsied and tattooed. Biopsy positive for adenocarcinoma. He is status post small bowel resection on 4/4/23. Pathology positive for  Invasive adenocarcinoma with partial mucinous component (40% of tumor) Lymphovascular invasion is present 17 lymph nodes, 3 positive for metastatic carcinoma (3/17) .Resection margins free of tumor  Pathologic stage pT3 pN Category:  pN2 . Today , he is doing well. Denies dizziness, weakness. No CP, SOB.Appetite improving postop.CT a/p w/contrast 4/13/23 shows Postoperative changes from partial small-bowel resection for a small bowel tumor . No evidence of distant metastases.      Interval Hx: Doing well  S/p C11 completed 11/1/23  No N/V  No fatigue  Minor intermittent tingling/numbness in feet, stable  Remains on gabapetin  No SOB/CP    Workup for abnormal transaminases shows positive Hep B core ab   Taking Vemlidy     Sochx: Retired . Never smoked. Drinks etoh occasionally.   .       Past Medical History:   Diagnosis Date    Hyperlipemia     Hypertension        Past Surgical History:   Procedure Laterality Date    DIAGNOSTIC LAPAROSCOPY N/A 4/4/2023    Procedure: LAPAROSCOPY, DIAGNOSTIC;  Surgeon: Tyrone Martínez MD;   Location: [facility] OR;  Service: General;  Laterality: N/A;    ESOPHAGOGASTRODUODENOSCOPY N/A 6/8/2022    Procedure: EGD (ESOPHAGOGASTRODUODENOSCOPY);  Surgeon: Aamir Rushing MD;  Location: [facility];  Service: Endoscopy;  Laterality: N/A;    EXCISION, SMALL INTESTINE N/A 4/4/2023    Procedure: EXCISION, SMALL INTESTINE/SMALL BOWEL RESECTION;  Surgeon: Tyrone Martínez MD;  Location: [facility] OR;  Service: General;  Laterality: N/A;    INSERTION OF VENOUS ACCESS PORT Right 5/2/2023    Procedure: INSERTION, VENOUS ACCESS PORT;  Surgeon: Tyrone Martínez MD;  Location: [facility] OR;  Service: General;  Laterality: Right;    INTRALUMINAL GASTROINTESTINAL TRACT IMAGING VIA CAPSULE N/A 1/20/2023    Procedure: IMAGING PROCEDURE, GI TRACT, INTRALUMINAL, VIA CAPSULE;  Surgeon: Aamir Rushing MD;  Location: [facility];  Service: Endoscopy;  Laterality: N/A;    LAPAROTOMY, EXPLORATORY N/A 4/4/2023    Procedure: LAPAROTOMY, EXPLORATORY;  Surgeon: Tyrone Martínez MD;  Location: [facility];  Service: General;  Laterality: N/A;    SMALL BOWEL ENTEROSCOPY N/A 3/20/2023    Procedure: ENTEROSCOPY Upper DBE;  Surgeon: Giancarlo Cruz MD;  Location: [facility];  Service: Endoscopy;  Laterality: N/A;        Social History     Tobacco Use    Smoking status: Never    Smokeless tobacco: Never   Substance Use Topics    Alcohol use: Yes     Comment: daily use for years, stopped    Drug use: Never        Review of Systems   Constitutional:  Positive for fatigue. Negative for appetite change, fever and unexpected weight change.   HENT:  Negative for mouth sores.    Eyes:  Negative for visual disturbance.   Respiratory:  Negative for cough and shortness of breath.    Cardiovascular:  Negative for chest pain.   Gastrointestinal:  Negative for abdominal pain and diarrhea.   Genitourinary:  Negative for frequency.   Musculoskeletal:  Negative for back pain.   Integumentary:  Negative for rash.   Neurological:  Positive for numbness. Negative for  "headaches.   Hematological:  Negative for adenopathy.   Psychiatric/Behavioral:  The patient is not nervous/anxious.           Objective       Vitals:    11/08/23 0843   BP: (!) 149/87   Pulse: 72   SpO2: 97%   Weight: 92.3 kg (203 lb 7.8 oz)   Height: 6' 3" (1.905 m)               Physical Exam  Constitutional:       Appearance: He is well-developed.   HENT:      Head: Normocephalic.      Mouth/Throat:      Pharynx: No oropharyngeal exudate.   Eyes:      General: No scleral icterus.        Right eye: No discharge.         Left eye: No discharge.      Conjunctiva/sclera: Conjunctivae normal.   Neck:      Thyroid: No thyromegaly.   Cardiovascular:      Rate and Rhythm: Normal rate and regular rhythm.      Heart sounds: Normal heart sounds. No murmur heard.  Pulmonary:      Effort: Pulmonary effort is normal.      Breath sounds: Normal breath sounds. No wheezing or rales.   Abdominal:      General: Bowel sounds are normal.      Palpations: Abdomen is soft.      Tenderness: There is no abdominal tenderness. There is no guarding or rebound.   Musculoskeletal:         General: No swelling. Normal range of motion.      Cervical back: Normal range of motion and neck supple.   Lymphadenopathy:      Cervical: No cervical adenopathy.      Upper Body:      Right upper body: No supraclavicular adenopathy.      Left upper body: No supraclavicular adenopathy.   Skin:     Findings: No erythema or rash.   Neurological:      Mental Status: He is alert and oriented to person, place, and time.      Cranial Nerves: No cranial nerve deficit.   Psychiatric:         Mood and Affect: Mood normal.         Behavior: Behavior normal.              Pathology 4/4/23  Small intestine, segmental resection:   - Invasive adenocarcinoma with partial mucinous component (40% of tumor)       - Tumor invades through muscularis propria into subserosa   - Lymphovascular invasion is present   - 17 lymph nodes, 3 positive for metastatic carcinoma (3/17)   - " Resection margins free of tumor   - Background small intestinal mucosa without significant histopathologic alteration   - See CAP Tumor synoptic and comment     CAP Tumor Synoptic:   Procedure:  Segmental resection   Tumor site:  Small intestine, not otherwise specified   Histologic type:  Adenocarcinoma (not otherwise characterized), with mucinous component comprising approximately 40% of tumor   Histologic grade:  G2, moderately differentiated   Tumor size:  5.5 cm in greatest dimension   Tumor extent:  Invades through muscularis propria into subserosa without serosal penetration   Lymphovascular invasion:  Present   Macroscopic tumor perforation:  Not identified   Margin status for invasive carcinoma:  All margins negative for invasive carcinoma   Margin status for dysplasia:  All margins negative for carcinoma in situ (high-grade dysplasia)/ adenoma   Regional lymph node status:  Tumor present in regional lymph nodes   Number of lymph nodes with tumor:  3   Number of lymph nodes examined:  17   PATHOLOGIC STAGE CLASSIFICATION   TNM descriptors:  Not applicable   pT Category:  pT3   pN Category:  pN2   Special studies:  Intact expression of DNA mismatch repair proteins (MLH1, MSH2, MSH6, PMS2) in tumor by immunohistochemistry (performed on prior biopsy KES-, collected 3/20/2023); interpreted by Dr. Garcias)   Comment:  Records from this patient's reported recent colonoscopy are not available for review.       CT a/p w/contrast 4/13/23  Impression:     Postoperative changes from partial small-bowel resection for a small bowel tumor.  Pathology results are pending.     Skin thickening and subcutaneous edema near the umbilicus, which could be inflammatory or infectious.  No organized fluid collections along the surgical incision.     Dilated small bowel loops with gradual tapering distally, likely ileus in the early postoperative setting.     Small volume ascites and trace pneumoperitoneum, likely  postoperative.  Anastomotic leak is less likely.  No organized intraperitoneal collections.     Other findings as described.        CT chest w/out contrast  5/1/23   Impression:     Asymmetric thyroid gland with possible left lobe thyroid nodule measuring up to 2.2 cm.  Further evaluation with thyroid ultrasound examination is recommended.     Mild centrilobular emphysematous changes.     Mucous plugging within the left upper lobe laterally and within the right lower lobe laterally.     Cluster of pulmonary nodules within the upper left upper lobe measuring up to 0.6 cm in size.  These are likely infectious/inflammatory in etiology, however continued surveillance is recommended in this patient with history of malignancy.     Hepatic cysts.         CT c/a/p 7/3/2023   Impression:     1. Postsurgical changes compatible with partial small bowel resection with enteroenteric anastomosis and surgical sutures in the region left upper quadrant, not significantly changed.  2. Near complete interval resolution of previously noted cluster of pulmonary nodules in the left upper lobe.  Cluster of approximately 3 ground-glass pulmonary micro nodules in the left upper lobe and, second cluster of approximately 3 ground-glass pulmonary micro nodules in the right lower lobe, not significantly changed.  A few borderline enlarged mediastinal lymph nodes, not significantly changed.  3. Multiple simple appearing hepatic parenchymal cyst and multiple additional subcentimeter hypodensities that are too small to accurately characterize, not significantly changed.  4. 0.4-cm parenchymal hypodensity in the pancreatic head, not significantly changed.  5. Simple appearing renal cortical cystic lesion in the lateral aspect of the right midpole, not significantly changed. Multiple additional subcentimeter renal cortical hypodensities bilaterally are too small to accurately characterize but not significantly changed when compared to  prior.        Lab Results   Component Value Date    WBC 15.99 (H) 11/07/2023    HGB 13.5 (L) 11/07/2023    HCT 40.4 11/07/2023    MCV 96 11/07/2023    PLT 94 (L) 11/07/2023       Hep B Core Total Ab Non-reactive Reactive Abnormal          Hepatitis B Surface Ag Non-reactive Non-reactive    Hep B C IgM Non-reactive Non-reactive    Hep A IgM Non-reactive Non-reactive    Hepatitis C Ab Non-reactive Non-reactive        Component      Latest Ref Rng 7/7/2023 7/24/2023 9/8/2023 11/7/2023   CEA      0.0 - 5.0 ng/mL 2.4  2.4  3.0  3.8            Assessment and Plan     Problem List Items Addressed This Visit           Diagnoses and all orders for this visit:    Adenocarcinoma of small bowel  Poli Young is a 67 y.o. male with biopsy proven small bowel adenocarcinoma now status post small bowel resection on 4/4/23.pT3 pN2M0  CT a/p no evidence of distant mets  Adjuvant chemotherapy for 6 mo is recommended for patients with node-positive, completely resected disease. The regimens of choice are CAPOX (capecitabine and oxaliplatin) and FOLFOX (folinic acid, fluorouracil, and oxaliplatin).    CT imaging 7/3/2023 reviewed   S/p C11 completed 11/1/23  Chemo previously held  2/2 Chemo-induced thrombocytopenia  Labs reviewed  Proceed with final chemo, C12 on 11/13/23 ( pending lab parameters)   Plan follow up CT imaging post chemo    Immunodeficiency secondary to chemotherapy    Hep B core antibody positive   F/b  Hepatology  Cont Vemlidy    Thyroid nodule  US thyroid complete and nodule bx with benign results    Pulmonary nodules  Follow up CT imaging Near complete interval resolution of previously noted cluster of pulmonary nodules in the left upper lobe.  Cluster of approximately 3 ground-glass pulmonary micro nodules in the left upper lobe and, second cluster of approximately 3 ground-glass pulmonary micro nodules in the right lower lobe, not significantly changed.  A few borderline enlarged mediastinal lymph nodes, not  significantly changed.  Plan follow up imaging     Chemotherapy-induced neuropathy  Stable   Cont neuropathy cream   Cont gabapentin 300mg po qhs       Chemotherapy induced  thrombocytopenia  Plt ct 94k  Asymptomatic   Repeat CBC on mon     Antineoplastic chemotherapy induced anemia  Hb 13.5 g/dl on 11/7/23    Fe studies reviewed   Monitor        CBC mon am prior to chemo  Proceed with chemo on Mon ( pending labs)   CT in 3-4 weeks  Cbc,cmp, CEA  prior to f/u mid Dec        I spent a total of  40  minutes on the day of the visit.This includes face to face time and non-face to face time preparing to see the patient (eg, review of tests), obtaining and/or reviewing separately obtained history, documenting clinical information in the electronic or other health record, independently interpreting results and communicating results to the patient/family/caregiver, and coordinating care.            Amparo Michaels MD  H[facility]/Onc [facility]

## 2023-11-12 RX ORDER — EPINEPHRINE 0.3 MG/.3ML
0.3 INJECTION SUBCUTANEOUS ONCE AS NEEDED
Status: CANCELLED | OUTPATIENT
Start: 2023-11-20

## 2023-11-12 RX ORDER — HEPARIN 100 UNIT/ML
500 SYRINGE INTRAVENOUS
Status: CANCELLED | OUTPATIENT
Start: 2023-11-20

## 2023-11-12 RX ORDER — FLUOROURACIL 50 MG/ML
400 INJECTION, SOLUTION INTRAVENOUS
Status: CANCELLED | OUTPATIENT
Start: 2023-11-13

## 2023-11-12 RX ORDER — DIPHENHYDRAMINE HYDROCHLORIDE 50 MG/ML
50 INJECTION INTRAMUSCULAR; INTRAVENOUS ONCE AS NEEDED
Status: CANCELLED | OUTPATIENT
Start: 2023-11-20

## 2023-11-12 RX ORDER — FLUOROURACIL 50 MG/ML
2400 INJECTION, SOLUTION INTRAVENOUS
Status: CANCELLED | OUTPATIENT
Start: 2023-11-13

## 2023-11-12 RX ORDER — SODIUM CHLORIDE 0.9 % (FLUSH) 0.9 %
10 SYRINGE (ML) INJECTION
Status: CANCELLED | OUTPATIENT
Start: 2023-11-20

## 2023-11-12 RX ORDER — HEPARIN 100 UNIT/ML
500 SYRINGE INTRAVENOUS
Status: CANCELLED | OUTPATIENT
Start: 2023-11-22

## 2023-11-12 RX ORDER — SODIUM CHLORIDE 0.9 % (FLUSH) 0.9 %
10 SYRINGE (ML) INJECTION
Status: CANCELLED | OUTPATIENT
Start: 2023-11-22

## 2023-11-13 ENCOUNTER — LAB VISIT (OUTPATIENT)
Dept: LAB | Facility: HOSPITAL | Age: 68
End: 2023-11-13
Attending: INTERNAL MEDICINE
Payer: MEDICARE

## 2023-11-13 DIAGNOSIS — C17.9 ADENOCARCINOMA OF SMALL BOWEL: ICD-10-CM

## 2023-11-13 LAB
BASOPHILS # BLD AUTO: 0.03 K/UL (ref 0–0.2)
BASOPHILS NFR BLD: 0.9 % (ref 0–1.9)
DIFFERENTIAL METHOD: ABNORMAL
EOSINOPHIL # BLD AUTO: 0.1 K/UL (ref 0–0.5)
EOSINOPHIL NFR BLD: 3.7 % (ref 0–8)
ERYTHROCYTE [DISTWIDTH] IN BLOOD BY AUTOMATED COUNT: 14.8 % (ref 11.5–14.5)
HCT VFR BLD AUTO: 39.3 % (ref 40–54)
HGB BLD-MCNC: 13 G/DL (ref 14–18)
IMM GRANULOCYTES # BLD AUTO: 0.08 K/UL (ref 0–0.04)
IMM GRANULOCYTES NFR BLD AUTO: 2.3 % (ref 0–0.5)
LYMPHOCYTES # BLD AUTO: 0.9 K/UL (ref 1–4.8)
LYMPHOCYTES NFR BLD: 26.9 % (ref 18–48)
MCH RBC QN AUTO: 31.7 PG (ref 27–31)
MCHC RBC AUTO-ENTMCNC: 33.1 G/DL (ref 32–36)
MCV RBC AUTO: 96 FL (ref 82–98)
MONOCYTES # BLD AUTO: 0.5 K/UL (ref 0.3–1)
MONOCYTES NFR BLD: 14.6 % (ref 4–15)
NEUTROPHILS # BLD AUTO: 1.8 K/UL (ref 1.8–7.7)
NEUTROPHILS NFR BLD: 51.6 % (ref 38–73)
NRBC BLD-RTO: 0 /100 WBC
PLATELET # BLD AUTO: 89 K/UL (ref 150–450)
PMV BLD AUTO: 9.3 FL (ref 9.2–12.9)
RBC # BLD AUTO: 4.1 M/UL (ref 4.6–6.2)
WBC # BLD AUTO: 3.5 K/UL (ref 3.9–12.7)

## 2023-11-13 PROCEDURE — 36415 COLL VENOUS BLD VENIPUNCTURE: CPT | Performed by: INTERNAL MEDICINE

## 2023-11-13 PROCEDURE — 85025 COMPLETE CBC W/AUTO DIFF WBC: CPT | Performed by: INTERNAL MEDICINE

## 2023-11-20 ENCOUNTER — INFUSION (OUTPATIENT)
Dept: INFUSION THERAPY | Facility: HOSPITAL | Age: 68
End: 2023-11-20
Attending: INTERNAL MEDICINE
Payer: MEDICARE

## 2023-11-20 VITALS
TEMPERATURE: 98 F | DIASTOLIC BLOOD PRESSURE: 76 MMHG | SYSTOLIC BLOOD PRESSURE: 135 MMHG | OXYGEN SATURATION: 95 % | HEIGHT: 75 IN | HEART RATE: 75 BPM | BODY MASS INDEX: 26.09 KG/M2 | RESPIRATION RATE: 18 BRPM | WEIGHT: 209.81 LBS

## 2023-11-20 DIAGNOSIS — T45.1X5A CHEMOTHERAPY-INDUCED NEUROPATHY: ICD-10-CM

## 2023-11-20 DIAGNOSIS — G62.0 CHEMOTHERAPY-INDUCED NEUROPATHY: ICD-10-CM

## 2023-11-20 DIAGNOSIS — C17.9 ADENOCARCINOMA OF SMALL BOWEL: Primary | ICD-10-CM

## 2023-11-20 PROCEDURE — 96411 CHEMO IV PUSH ADDL DRUG: CPT

## 2023-11-20 PROCEDURE — 96368 THER/DIAG CONCURRENT INF: CPT

## 2023-11-20 PROCEDURE — 96375 TX/PRO/DX INJ NEW DRUG ADDON: CPT

## 2023-11-20 PROCEDURE — 25000003 PHARM REV CODE 250: Performed by: INTERNAL MEDICINE

## 2023-11-20 PROCEDURE — 63600175 PHARM REV CODE 636 W HCPCS: Performed by: INTERNAL MEDICINE

## 2023-11-20 PROCEDURE — 96413 CHEMO IV INFUSION 1 HR: CPT

## 2023-11-20 PROCEDURE — 96415 CHEMO IV INFUSION ADDL HR: CPT

## 2023-11-20 PROCEDURE — 96366 THER/PROPH/DIAG IV INF ADDON: CPT

## 2023-11-20 RX ORDER — GABAPENTIN 300 MG/1
300 CAPSULE ORAL NIGHTLY
Qty: 30 CAPSULE | Refills: 1 | Status: SHIPPED | OUTPATIENT
Start: 2023-11-20 | End: 2023-12-11 | Stop reason: SDUPTHER

## 2023-11-20 RX ORDER — FLUOROURACIL 50 MG/ML
400 INJECTION, SOLUTION INTRAVENOUS
Status: COMPLETED | OUTPATIENT
Start: 2023-11-20 | End: 2023-11-20

## 2023-11-20 RX ADMIN — DEXAMETHASONE SODIUM PHOSPHATE 0.25 MG: 10 INJECTION, SOLUTION INTRAMUSCULAR; INTRAVENOUS at 08:11

## 2023-11-20 RX ADMIN — FLUOROURACIL 5000 MG: 50 INJECTION, SOLUTION INTRAVENOUS at 12:11

## 2023-11-20 RX ADMIN — FLUOROURACIL 855 MG: 50 INJECTION, SOLUTION INTRAVENOUS at 11:11

## 2023-11-20 RX ADMIN — LEUCOVORIN CALCIUM 855 MG: 500 INJECTION, POWDER, LYOPHILIZED, FOR SOLUTION INTRAMUSCULAR; INTRAVENOUS at 09:11

## 2023-11-20 RX ADMIN — OXALIPLATIN 120 MG: 5 INJECTION, SOLUTION INTRAVENOUS at 09:11

## 2023-11-20 NOTE — PLAN OF CARE
Pt arrived to unit, ambulatory, for chemotherapy FOLFOX. Pt alert and oriented with no complaints upon arrival.     Port a cath accessed using sterile technique - flushes well with good blood return.     Pre-med aloxi/dex administered as per treatment plan.    Oxaliplatin and Leucovorin administered and infused concurrently over 2 hours as per treatment plan - pt tolerated with no complaints or S&S of reaction.    Adrucil IVP administered as per treatment plan - blood return from port present throughout.    Adrucil pump applied - new batteries placed, settings and functionality confirmed.     Pt discharged home upon completion in NAD.

## 2023-11-22 ENCOUNTER — INFUSION (OUTPATIENT)
Dept: INFUSION THERAPY | Facility: HOSPITAL | Age: 68
End: 2023-11-22
Attending: INTERNAL MEDICINE
Payer: MEDICARE

## 2023-11-22 VITALS
RESPIRATION RATE: 16 BRPM | OXYGEN SATURATION: 98 % | DIASTOLIC BLOOD PRESSURE: 78 MMHG | TEMPERATURE: 98 F | HEART RATE: 79 BPM | SYSTOLIC BLOOD PRESSURE: 153 MMHG

## 2023-11-22 DIAGNOSIS — C17.9 ADENOCARCINOMA OF SMALL BOWEL: Primary | ICD-10-CM

## 2023-11-22 PROCEDURE — 63600175 PHARM REV CODE 636 W HCPCS: Mod: JZ,JG | Performed by: INTERNAL MEDICINE

## 2023-11-22 PROCEDURE — 96377 APPLICATON ON-BODY INJECTOR: CPT

## 2023-11-22 RX ORDER — HEPARIN 100 UNIT/ML
500 SYRINGE INTRAVENOUS
Status: DISCONTINUED | OUTPATIENT
Start: 2023-11-22 | End: 2023-11-22 | Stop reason: HOSPADM

## 2023-11-22 RX ADMIN — PEGFILGRASTIM 6 MG: KIT SUBCUTANEOUS at 09:11

## 2023-11-22 RX ADMIN — HEPARIN 500 UNITS: 100 SYRINGE at 09:11

## 2023-11-22 NOTE — PLAN OF CARE
Patient presented to unit for pump d/c and Neulasta OBI. VSS. No complaints voiced. Port with blood return flushed with NS and heparin locked. Neulasta OBI applied. Ambulatory and in NAD upon discharge.     Problem: Fatigue  Goal: Improved Activity Tolerance  Outcome: Ongoing, Progressing

## 2023-11-28 ENCOUNTER — OFFICE VISIT (OUTPATIENT)
Dept: HEPATOLOGY | Facility: CLINIC | Age: 68
End: 2023-11-28
Payer: MEDICARE

## 2023-11-28 ENCOUNTER — PROCEDURE VISIT (OUTPATIENT)
Dept: HEPATOLOGY | Facility: CLINIC | Age: 68
End: 2023-11-28
Payer: MEDICARE

## 2023-11-28 VITALS — WEIGHT: 209.69 LBS | BODY MASS INDEX: 26.07 KG/M2 | HEIGHT: 75 IN

## 2023-11-28 DIAGNOSIS — K74.00 LIVER FIBROSIS: ICD-10-CM

## 2023-11-28 DIAGNOSIS — R76.8 HEPATITIS B CORE ANTIBODY POSITIVE: Primary | ICD-10-CM

## 2023-11-28 DIAGNOSIS — R79.89 ELEVATED FERRITIN: ICD-10-CM

## 2023-11-28 DIAGNOSIS — R74.8 ABNORMAL TRANSAMINASES: ICD-10-CM

## 2023-11-28 DIAGNOSIS — Z23 NEED FOR HEPATITIS A VACCINATION: ICD-10-CM

## 2023-11-28 PROCEDURE — 3008F BODY MASS INDEX DOCD: CPT | Mod: CPTII,S$GLB,, | Performed by: NURSE PRACTITIONER

## 2023-11-28 PROCEDURE — 1126F AMNT PAIN NOTED NONE PRSNT: CPT | Mod: CPTII,S$GLB,, | Performed by: NURSE PRACTITIONER

## 2023-11-28 PROCEDURE — 99214 PR OFFICE/OUTPT VISIT, EST, LEVL IV, 30-39 MIN: ICD-10-PCS | Mod: S$GLB,,, | Performed by: NURSE PRACTITIONER

## 2023-11-28 PROCEDURE — 1159F PR MEDICATION LIST DOCUMENTED IN MEDICAL RECORD: ICD-10-PCS | Mod: CPTII,S$GLB,, | Performed by: NURSE PRACTITIONER

## 2023-11-28 PROCEDURE — 3288F FALL RISK ASSESSMENT DOCD: CPT | Mod: CPTII,S$GLB,, | Performed by: NURSE PRACTITIONER

## 2023-11-28 PROCEDURE — 1159F MED LIST DOCD IN RCRD: CPT | Mod: CPTII,S$GLB,, | Performed by: NURSE PRACTITIONER

## 2023-11-28 PROCEDURE — 99214 OFFICE O/P EST MOD 30 MIN: CPT | Mod: S$GLB,,, | Performed by: NURSE PRACTITIONER

## 2023-11-28 PROCEDURE — 1160F RVW MEDS BY RX/DR IN RCRD: CPT | Mod: CPTII,S$GLB,, | Performed by: NURSE PRACTITIONER

## 2023-11-28 PROCEDURE — 4010F PR ACE/ARB THEARPY RXD/TAKEN: ICD-10-PCS | Mod: CPTII,S$GLB,, | Performed by: NURSE PRACTITIONER

## 2023-11-28 PROCEDURE — 99999 PR PBB SHADOW E&M-EST. PATIENT-LVL IV: ICD-10-PCS | Mod: PBBFAC,,, | Performed by: NURSE PRACTITIONER

## 2023-11-28 PROCEDURE — 1160F PR REVIEW ALL MEDS BY PRESCRIBER/CLIN PHARMACIST DOCUMENTED: ICD-10-PCS | Mod: CPTII,S$GLB,, | Performed by: NURSE PRACTITIONER

## 2023-11-28 PROCEDURE — 3288F PR FALLS RISK ASSESSMENT DOCUMENTED: ICD-10-PCS | Mod: CPTII,S$GLB,, | Performed by: NURSE PRACTITIONER

## 2023-11-28 PROCEDURE — 3008F PR BODY MASS INDEX (BMI) DOCUMENTED: ICD-10-PCS | Mod: CPTII,S$GLB,, | Performed by: NURSE PRACTITIONER

## 2023-11-28 PROCEDURE — 1126F PR PAIN SEVERITY QUANTIFIED, NO PAIN PRESENT: ICD-10-PCS | Mod: CPTII,S$GLB,, | Performed by: NURSE PRACTITIONER

## 2023-11-28 PROCEDURE — 1101F PT FALLS ASSESS-DOCD LE1/YR: CPT | Mod: CPTII,S$GLB,, | Performed by: NURSE PRACTITIONER

## 2023-11-28 PROCEDURE — 1101F PR PT FALLS ASSESS DOC 0-1 FALLS W/OUT INJ PAST YR: ICD-10-PCS | Mod: CPTII,S$GLB,, | Performed by: NURSE PRACTITIONER

## 2023-11-28 PROCEDURE — 99999 PR PBB SHADOW E&M-EST. PATIENT-LVL IV: CPT | Mod: PBBFAC,,, | Performed by: NURSE PRACTITIONER

## 2023-11-28 PROCEDURE — 76981 FIBROSCAN NEW ORLEANS (VIBRATION CONTROLLED TRANSIENT ELASTOGRAPHY): ICD-10-PCS | Mod: S$GLB,,, | Performed by: NURSE PRACTITIONER

## 2023-11-28 PROCEDURE — 76981 USE PARENCHYMA: CPT | Mod: S$GLB,,, | Performed by: NURSE PRACTITIONER

## 2023-11-28 PROCEDURE — 4010F ACE/ARB THERAPY RXD/TAKEN: CPT | Mod: CPTII,S$GLB,, | Performed by: NURSE PRACTITIONER

## 2023-11-28 NOTE — PATIENT INSTRUCTIONS
1. Fibroscan to look for fat or scar tissue in the liver showed possibe moderate scar tissue  Options for further evaluation/confirmation of fibrosis/cirrhosis includin. Liver biopsy OR (not necessary currently)  2. MRI elastography  3. Repeat fibroscan in 3 months to compare     2. Recommend vaccines for Hepatitis  A, see below   3. Follow up in 3 months with labs a few days before, fibroscan same day         HEP A VACCINE  Your labs show that you DO have immunity against Hep B (+ Hep B surface antibody), so no further vaccine needed for Hep B    However, your immunity markers show that you do NOT have immunity against Hepatitis A, so I recommend that you receive the Hepatitis A vaccine. Hep A can be transmitted through food and water and can cause significant liver injury. There was previously a significant Hepatitis A outbreak in [place]. The most common ways to contract Hepatitis A are from eating at restaurants or with groups of people, sharing food/drinks with others, or some food in the grocery store.   The vaccine will protect your liver from the virus.  The vaccine series is 2 vaccines: one now and one 6 months after the 1st one. I sent the vaccine to the [facility] pharmacy. I recommend calling them in a couple of days to see if the vaccine is covered by your insurance and arrange the vaccines if they are covered. Their number is [phone].      If the vaccine is not covered at the pharmacy level, I sent an order that you can get the vaccine in the infectious disease department at [facility]. If that is the case, please call [phone]  to schedule your vaccine administration appointment in the infectious disease department.  If you need to proceed with vaccines with the infectious disease department, you can call to obtain a cost for these vaccines before you proceed, you can call the [facility] office at [phone] or [phone]

## 2023-11-28 NOTE — PROGRESS NOTES
[facility]    PCP: Rola Feliz FNP     CHIEF COMPLAINT: elevated alk phos, Hep B core antibody positive, liver fibrosis (?), elevated ferritin     HPI: This is a 68 y.o. male with PMH noted below, presenting for follow up of above    Serological workup was negative for Lebron's, alpha-1 antitrypsin deficiency,  and viral hepatitis A, B and C   Elevated ferritin but low iron - will repeat with RTC  Negative AMA    Prior serologic workup:   Lab Results   Component Value Date    AMAIFA Negative 1:40 11/20/2023    FERRITIN 654 (H) 11/07/2023    FESATURATED 18 (L) 11/20/2023    CERULOPLSM 27.0 11/20/2023    HEPBSAG Non-reactive 08/04/2023    HEPCAB Non-reactive 08/04/2023    HEPAIGM Non-reactive 08/04/2023     Liver fibrosis staging  -- fibroscan 11/2023 noted F2, S0 (kPA 9.7, )      Interval HPI: Presents today with wife. + Hep B core, on Vemlidy for Hep B prophylaxis since has been getting chemo for small bowel cancer  Of note: being followed by Dr. Michaels, hem/onc for adenocarcinoma of small bowel, last chemo dose 11/2023  GGT WNL  Sero w/u labs negative, alk phos returned to normal   Fibroscan noted F2, unclear cause    Labs done 11/2023 note normal alk phos (previously elevated 8/2023)  AST and ALT now normal   Platelets low since 6/2023  Synthetic liver function WNL    Lab Results   Component Value Date    ALT 38 11/20/2023    ALT 38 11/20/2023    AST 32 11/20/2023    AST 32 11/20/2023    ALKPHOS 114 11/20/2023    ALKPHOS 114 11/20/2023    BILITOT 0.4 11/20/2023    BILITOT 0.4 11/20/2023    ALBUMIN 3.7 11/20/2023    ALBUMIN 3.7 11/20/2023    INR 1.0 06/08/2022     (L) 11/20/2023     (L) 11/20/2023       CT done 7/2023 noted Multiple simple appearing hepatic parenchymal cyst and multiple additional subcentimeter hypodensities that are too small to accurately characterize, not significantly change.  Otherwise, the liver is normal in size, morphology and  "attenuation without appreciable surface nodularity    US 11/2023 noted normal liver and echotexture. Multiple hepatic cysts present throughout the liver largest of which in the LEFT hepatic lobe 5.2 x 3.3 x 5.9 cm and largest of which in the RIGHT hepatic lobe is 6.1 x 3.8 x 4.9 cm.       Lab Results   Component Value Date    AFP <2.0 11/20/2023       +Immunity to Hep B: needs Hep A vaccine, sent to [facility] pharm and ID          Allergy and medication list reviewed and updated     PMHX:  has a past medical history of Hyperlipemia and Hypertension.    PSHX:  has a past surgical history that includes Esophagogastroduodenoscopy (N/A, 6/8/2022); Intraluminal gastrointestinal tract imaging via capsule (N/A, 1/20/2023); Small bowel enteroscopy (N/A, 3/20/2023); Diagnostic laparoscopy (N/A, 4/4/2023); excision, small intestine (N/A, 4/4/2023); laparotomy, exploratory (N/A, 4/4/2023); and Insertion of venous access port (Right, 5/2/2023).    FAMILY HISTORY: Updated and reviewed in [facility]    SOCIAL HISTORY:   Social History     Substance and Sexual Activity   Alcohol Use Yes    Comment: daily use for years, stopped       Social History     Substance and Sexual Activity   Drug Use Never       ROS:   GENERAL: Denies fatigue  CARDIOVASCULAR: Denies edema  GI: Denies abdominal pain  SKIN: Denies rash, itching   NEURO: Denies confusion, memory loss, or mood changes    PHYSICAL EXAM:   Friendly Black or  male, in no acute distress; alert and oriented to person, place and time  VITALS: Ht 6' 3" (1.905 m)   Wt 95.1 kg (209 lb 10.5 oz)   BMI 26.21 kg/m²   EYES: Sclerae anicteric  GI: Soft, non-tender, non-distended. No ascites.  EXTREMITIES:  No edema.  SKIN: Warm and dry. No jaundice. No telangectasias noted. No palmar erythema.  NEURO:  No asterixis.  PSYCH:  Thought and speech pattern appropriate. Behavior normal      EDUCATION:  See instructions discussed with patient in Instructions section of the After Visit Summary "     ASSESSMENT & PLAN:  68 y.o. male with:  1. Elevated alk phos  -- sero w/u negative, returned to normal     2. Hep B core positive Ab, no chronic Hep B  -- on Vemlidy for prophylaxis given recent chemo use   -- transaminases returned to normal  -- Hep B labs: see HPI  -- synthetic liver function WNL  -- immunity to Hep A: see HPI  -- labs with RTC    3. Liver fibrosis ?  -- F2 on fibroscan  Discussed options for further evaluation/confirmation of fibrosis/cirrhosis includin. Liver biopsy OR  2. MRI elastography  3. Repeat fibroscan in 3-6 months since all other w/u labs negative    Pt will proceed with option #3     4. Chemo use  -- continue Hep B prophylaxis, Vemlidy 25 mg daily given recent chemo use  -- will continue Hep B prophylaxis for 12-18 months after last dose of chemo (May 2025)   labs (Hep B DNA and CMP) for at least 12 months after last dose of Hep B prophylaxis    5.  Thrombocytopenia  -- may be associated with chemo, No cirrhosis on fibroscan, will trend after chemo completed and repeat fibroscan with RTC            Follow up in about 3 months (around 2024). with labs and fibroscan before  Orders Placed This Encounter   Procedures    FibroScan [place] (Vibration Controlled Transient Elastography)    Hepatitis A Vaccine (Adult) (IM)    Hepatic Function Panel    CBC Without Differential    Hepatitis B Surface Antigen    HEPATITIS B VIRAL DNA, QUANTITATIVE        Thank you for allowing me to participate in the care of Poli Young    ANU Jefferson    I spent a total of 30 minutes on the day of the visit.This includes face to face time and non-face to face time preparing to see the patient (eg, review of tests), obtaining and/or reviewing separately obtained history, documenting clinical information in the electronic or other health record, independently interpreting results and communicating results to the patient/family/caregiver, and coordinating care.         CC'ed note to:    Dr. Michaels

## 2023-11-28 NOTE — PROCEDURES
FibroScan [facility] (Vibration Controlled Transient Elastography)    Date/Time: 11/28/2023 1:30 PM    Performed by: Noemy Barcenas NP  Authorized by: Noemy Barcenas, AMRITA    Diagnosis:  Other    Probe:  M    Universal Protocol: Patient's identity, procedure and site were verified, confirmatory pause was performed.  Discussed procedure including risks and potential complications.  Questions answered.  Patient verbalizes understanding and wishes to proceed with VCTE.     Procedure: After providing explanations of the procedure, patient was placed in the supine position with right arm in maximum abduction to allow optimal exposure of right lateral abdomen.  Patient was briefly assessed, Testing was performed in the mid-axillary location, 50Hz Shear Wave pulses were applied and the resulting Shear Wave and Propagation Speed detected with a 3.5 MHz ultrasonic signal, using the FibroScan probe, Skin to liver capsule distance and liver parenchyma were accessed during the entire examination with the FibroScan probe, Patient was instructed to breathe normally and to abstain from sudden movements during the procedure, allowing for random measurements of liver stiffness. At least 10 Shear Waves were produced, Individual measurements of each Shear Wave were calculated.  Patient tolerated the procedure well with no complications.  Meets discharge criteria as was dismissed.  Rates pain 0 out of 10.  Patient will follow up with ordering provider to review results.    Findings  Median liver stiffness score:  9.7  CAP Reading: dB/m:  226    IQR/med %:  11  Interpretation  Fibrosis interpretation is based on medial liver stiffness - Kilopascal (kPa).    Fibrosis Stage:  F2  Steatosis interpretation is based on controlled attenuation parameter - (dB/m).    Steatosis Grade:  <S1

## 2023-11-29 ENCOUNTER — HOSPITAL ENCOUNTER (OUTPATIENT)
Dept: RADIOLOGY | Facility: HOSPITAL | Age: 68
Discharge: HOME | End: 2023-11-29
Attending: INTERNAL MEDICINE
Payer: MEDICARE

## 2023-11-29 DIAGNOSIS — C17.9 ADENOCARCINOMA OF SMALL BOWEL: ICD-10-CM

## 2023-11-29 PROCEDURE — 74177 CT ABD & PELVIS W/CONTRAST: CPT | Mod: TC

## 2023-11-29 PROCEDURE — 71260 CT THORAX DX C+: CPT | Mod: TC

## 2023-11-29 PROCEDURE — 74177 CT ABD & PELVIS W/CONTRAST: CPT | Mod: 26,,, | Performed by: RADIOLOGY

## 2023-11-29 PROCEDURE — 25500020 PHARM REV CODE 255: Performed by: INTERNAL MEDICINE

## 2023-11-29 PROCEDURE — 74177 CT CHEST ABDOMEN PELVIS WITH IV CONTRAST (XPD): ICD-10-PCS | Mod: 26,,, | Performed by: RADIOLOGY

## 2023-11-29 PROCEDURE — 71260 CT CHEST ABDOMEN PELVIS WITH IV CONTRAST (XPD): ICD-10-PCS | Mod: 26,,, | Performed by: RADIOLOGY

## 2023-11-29 PROCEDURE — 71260 CT THORAX DX C+: CPT | Mod: 26,,, | Performed by: RADIOLOGY

## 2023-11-29 RX ADMIN — IOHEXOL 15 ML: 300 INJECTION, SOLUTION INTRAVENOUS at 09:11

## 2023-11-29 RX ADMIN — IOHEXOL 100 ML: 350 INJECTION, SOLUTION INTRAVENOUS at 09:11

## 2023-12-08 ENCOUNTER — LAB VISIT (OUTPATIENT)
Dept: LAB | Facility: HOSPITAL | Age: 68
End: 2023-12-08
Attending: NURSE PRACTITIONER
Payer: MEDICARE

## 2023-12-08 DIAGNOSIS — C17.9 ADENOCARCINOMA OF SMALL BOWEL: ICD-10-CM

## 2023-12-08 LAB
ALBUMIN SERPL BCP-MCNC: 4 G/DL (ref 3.5–5.2)
ALP SERPL-CCNC: 128 U/L (ref 55–135)
ALT SERPL W/O P-5'-P-CCNC: 32 U/L (ref 10–44)
ANION GAP SERPL CALC-SCNC: 6 MMOL/L (ref 8–16)
AST SERPL-CCNC: 32 U/L (ref 10–40)
BASOPHILS # BLD AUTO: 0.02 K/UL (ref 0–0.2)
BASOPHILS NFR BLD: 0.5 % (ref 0–1.9)
BILIRUB SERPL-MCNC: 0.6 MG/DL (ref 0.1–1)
BUN SERPL-MCNC: 13 MG/DL (ref 8–23)
CALCIUM SERPL-MCNC: 9.4 MG/DL (ref 8.7–10.5)
CEA SERPL-MCNC: 3.2 NG/ML (ref 0–5)
CHLORIDE SERPL-SCNC: 110 MMOL/L (ref 95–110)
CO2 SERPL-SCNC: 24 MMOL/L (ref 23–29)
CREAT SERPL-MCNC: 1.1 MG/DL (ref 0.5–1.4)
DIFFERENTIAL METHOD: ABNORMAL
EOSINOPHIL # BLD AUTO: 0.2 K/UL (ref 0–0.5)
EOSINOPHIL NFR BLD: 4.6 % (ref 0–8)
ERYTHROCYTE [DISTWIDTH] IN BLOOD BY AUTOMATED COUNT: 14.6 % (ref 11.5–14.5)
EST. GFR  (NO RACE VARIABLE): >60 ML/MIN/1.73 M^2
GLUCOSE SERPL-MCNC: 100 MG/DL (ref 70–110)
HCT VFR BLD AUTO: 40.7 % (ref 40–54)
HGB BLD-MCNC: 13.2 G/DL (ref 14–18)
IMM GRANULOCYTES # BLD AUTO: 0.01 K/UL (ref 0–0.04)
IMM GRANULOCYTES NFR BLD AUTO: 0.3 % (ref 0–0.5)
LYMPHOCYTES # BLD AUTO: 1.1 K/UL (ref 1–4.8)
LYMPHOCYTES NFR BLD: 28.6 % (ref 18–48)
MCH RBC QN AUTO: 31.2 PG (ref 27–31)
MCHC RBC AUTO-ENTMCNC: 32.4 G/DL (ref 32–36)
MCV RBC AUTO: 96 FL (ref 82–98)
MONOCYTES # BLD AUTO: 0.4 K/UL (ref 0.3–1)
MONOCYTES NFR BLD: 10.4 % (ref 4–15)
NEUTROPHILS # BLD AUTO: 2 K/UL (ref 1.8–7.7)
NEUTROPHILS NFR BLD: 55.6 % (ref 38–73)
NRBC BLD-RTO: 0 /100 WBC
PLATELET # BLD AUTO: 117 K/UL (ref 150–450)
PMV BLD AUTO: 8.5 FL (ref 9.2–12.9)
POTASSIUM SERPL-SCNC: 4.3 MMOL/L (ref 3.5–5.1)
PROT SERPL-MCNC: 7.5 G/DL (ref 6–8.4)
RBC # BLD AUTO: 4.23 M/UL (ref 4.6–6.2)
SODIUM SERPL-SCNC: 140 MMOL/L (ref 136–145)
WBC # BLD AUTO: 3.67 K/UL (ref 3.9–12.7)

## 2023-12-08 PROCEDURE — 82378 CARCINOEMBRYONIC ANTIGEN: CPT | Performed by: INTERNAL MEDICINE

## 2023-12-08 PROCEDURE — 80053 COMPREHEN METABOLIC PANEL: CPT | Performed by: INTERNAL MEDICINE

## 2023-12-08 PROCEDURE — 85025 COMPLETE CBC W/AUTO DIFF WBC: CPT | Performed by: INTERNAL MEDICINE

## 2023-12-08 PROCEDURE — 36415 COLL VENOUS BLD VENIPUNCTURE: CPT | Performed by: INTERNAL MEDICINE

## 2023-12-11 ENCOUNTER — TELEPHONE (OUTPATIENT)
Dept: HEMATOLOGY/ONCOLOGY | Facility: CLINIC | Age: 68
End: 2023-12-11
Payer: MEDICARE

## 2023-12-11 ENCOUNTER — OFFICE VISIT (OUTPATIENT)
Dept: HEMATOLOGY/ONCOLOGY | Facility: CLINIC | Age: 68
End: 2023-12-11
Payer: MEDICARE

## 2023-12-11 VITALS
HEIGHT: 72 IN | HEART RATE: 76 BPM | SYSTOLIC BLOOD PRESSURE: 135 MMHG | BODY MASS INDEX: 28.01 KG/M2 | DIASTOLIC BLOOD PRESSURE: 76 MMHG | OXYGEN SATURATION: 96 % | WEIGHT: 206.81 LBS

## 2023-12-11 DIAGNOSIS — R76.8 HEPATITIS B CORE ANTIBODY POSITIVE: ICD-10-CM

## 2023-12-11 DIAGNOSIS — C17.9 ADENOCARCINOMA OF SMALL BOWEL: Primary | ICD-10-CM

## 2023-12-11 DIAGNOSIS — D69.59 CHEMOTHERAPY-INDUCED THROMBOCYTOPENIA: ICD-10-CM

## 2023-12-11 DIAGNOSIS — D64.81 ANTINEOPLASTIC CHEMOTHERAPY INDUCED ANEMIA: ICD-10-CM

## 2023-12-11 DIAGNOSIS — T45.1X5A CHEMOTHERAPY-INDUCED THROMBOCYTOPENIA: ICD-10-CM

## 2023-12-11 DIAGNOSIS — G62.0 CHEMOTHERAPY-INDUCED NEUROPATHY: ICD-10-CM

## 2023-12-11 DIAGNOSIS — T45.1X5A ANTINEOPLASTIC CHEMOTHERAPY INDUCED ANEMIA: ICD-10-CM

## 2023-12-11 DIAGNOSIS — T45.1X5A CHEMOTHERAPY-INDUCED NEUROPATHY: ICD-10-CM

## 2023-12-11 DIAGNOSIS — R91.8 PULMONARY NODULES: ICD-10-CM

## 2023-12-11 PROCEDURE — 1101F PT FALLS ASSESS-DOCD LE1/YR: CPT | Mod: CPTII,S$GLB,, | Performed by: INTERNAL MEDICINE

## 2023-12-11 PROCEDURE — 3288F PR FALLS RISK ASSESSMENT DOCUMENTED: ICD-10-PCS | Mod: CPTII,S$GLB,, | Performed by: INTERNAL MEDICINE

## 2023-12-11 PROCEDURE — 3075F SYST BP GE 130 - 139MM HG: CPT | Mod: CPTII,S$GLB,, | Performed by: INTERNAL MEDICINE

## 2023-12-11 PROCEDURE — 3288F FALL RISK ASSESSMENT DOCD: CPT | Mod: CPTII,S$GLB,, | Performed by: INTERNAL MEDICINE

## 2023-12-11 PROCEDURE — 99214 OFFICE O/P EST MOD 30 MIN: CPT | Mod: S$GLB,,, | Performed by: INTERNAL MEDICINE

## 2023-12-11 PROCEDURE — 99214 PR OFFICE/OUTPT VISIT, EST, LEVL IV, 30-39 MIN: ICD-10-PCS | Mod: S$GLB,,, | Performed by: INTERNAL MEDICINE

## 2023-12-11 PROCEDURE — 4010F PR ACE/ARB THEARPY RXD/TAKEN: ICD-10-PCS | Mod: CPTII,S$GLB,, | Performed by: INTERNAL MEDICINE

## 2023-12-11 PROCEDURE — 1126F PR PAIN SEVERITY QUANTIFIED, NO PAIN PRESENT: ICD-10-PCS | Mod: CPTII,S$GLB,, | Performed by: INTERNAL MEDICINE

## 2023-12-11 PROCEDURE — 1101F PR PT FALLS ASSESS DOC 0-1 FALLS W/OUT INJ PAST YR: ICD-10-PCS | Mod: CPTII,S$GLB,, | Performed by: INTERNAL MEDICINE

## 2023-12-11 PROCEDURE — 4010F ACE/ARB THERAPY RXD/TAKEN: CPT | Mod: CPTII,S$GLB,, | Performed by: INTERNAL MEDICINE

## 2023-12-11 PROCEDURE — 3008F BODY MASS INDEX DOCD: CPT | Mod: CPTII,S$GLB,, | Performed by: INTERNAL MEDICINE

## 2023-12-11 PROCEDURE — 99999 PR PBB SHADOW E&M-EST. PATIENT-LVL IV: ICD-10-PCS | Mod: PBBFAC,,, | Performed by: INTERNAL MEDICINE

## 2023-12-11 PROCEDURE — 3078F PR MOST RECENT DIASTOLIC BLOOD PRESSURE < 80 MM HG: ICD-10-PCS | Mod: CPTII,S$GLB,, | Performed by: INTERNAL MEDICINE

## 2023-12-11 PROCEDURE — 3008F PR BODY MASS INDEX (BMI) DOCUMENTED: ICD-10-PCS | Mod: CPTII,S$GLB,, | Performed by: INTERNAL MEDICINE

## 2023-12-11 PROCEDURE — 1159F PR MEDICATION LIST DOCUMENTED IN MEDICAL RECORD: ICD-10-PCS | Mod: CPTII,S$GLB,, | Performed by: INTERNAL MEDICINE

## 2023-12-11 PROCEDURE — 3078F DIAST BP <80 MM HG: CPT | Mod: CPTII,S$GLB,, | Performed by: INTERNAL MEDICINE

## 2023-12-11 PROCEDURE — 1126F AMNT PAIN NOTED NONE PRSNT: CPT | Mod: CPTII,S$GLB,, | Performed by: INTERNAL MEDICINE

## 2023-12-11 PROCEDURE — 3075F PR MOST RECENT SYSTOLIC BLOOD PRESS GE 130-139MM HG: ICD-10-PCS | Mod: CPTII,S$GLB,, | Performed by: INTERNAL MEDICINE

## 2023-12-11 PROCEDURE — 1159F MED LIST DOCD IN RCRD: CPT | Mod: CPTII,S$GLB,, | Performed by: INTERNAL MEDICINE

## 2023-12-11 PROCEDURE — 99999 PR PBB SHADOW E&M-EST. PATIENT-LVL IV: CPT | Mod: PBBFAC,,, | Performed by: INTERNAL MEDICINE

## 2023-12-11 RX ORDER — GABAPENTIN 300 MG/1
300 CAPSULE ORAL 2 TIMES DAILY
Qty: 120 CAPSULE | Refills: 1 | Status: SHIPPED | OUTPATIENT
Start: 2023-12-11 | End: 2024-03-27 | Stop reason: SDUPTHER

## 2023-12-11 NOTE — TELEPHONE ENCOUNTER
----- Message from Amparo Michaels MD sent at 12/11/2023  8:52 AM CST -----  SIGNATERA TEST- PER ZYRA  Cbc,cmp, CEA  prior to f/u 2mos  REFERRAL TO PULMONOLOGY - PULM NODULES

## 2023-12-11 NOTE — PROGRESS NOTES
Subjective     Patient ID: Poli Young is a 68 y.o. male.    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 Heunderwent work up for anemia and was found to have a small bowel mass about 100cm from IC valve by enteroscopy by Dr Cruz. Got one blood transfusion. He reports history of left central intermittent abdominal pain for several months. No NV. Was found to be anemic on bloodwork. EGD in June 2022 did not show any obvious cause of bleeding.  Capsule endoscopy in January showed blood in small bowel. Enteroscopy showed mass in March that was biopsied and tattooed. Biopsy positive for adenocarcinoma. He is status post small bowel resection on 4/4/23. Pathology positive for  Invasive adenocarcinoma with partial mucinous component (40% of tumor) Lymphovascular invasion is present 17 lymph nodes, 3 positive for metastatic carcinoma (3/17) .Resection margins free of tumor  Pathologic stage pT3 pN Category:  pN2 . Today , he is doing well. Denies dizziness, weakness. No CP, SOB.Appetite improving postop.CT a/p w/contrast 4/13/23 shows Postoperative changes from partial small-bowel resection for a small bowel tumor . No evidence of distant metastases.      Interval Hx: Doing well  S/p C12 completed 11/22/23  No N/V  No fatigue  Minor intermittent tingling/numbness in feet, stable  Out of gabapetin  No SOB/CP    Workup for abnormal transaminases shows positive Hep B core ab   Taking Vemlidy     Sochx: Retired . Never smoked. Drinks etoh occasionally.   .       Past Medical History:   Diagnosis Date    Hyperlipemia     Hypertension        Past Surgical History:   Procedure Laterality Date    DIAGNOSTIC LAPAROSCOPY N/A 4/4/2023    Procedure: LAPAROSCOPY, DIAGNOSTIC;  Surgeon: Tyrone MCDANIEL  MD Tanya;  Location: [facility] OR;  Service: General;  Laterality: N/A;    ESOPHAGOGASTRODUODENOSCOPY N/A 6/8/2022    Procedure: EGD (ESOPHAGOGASTRODUODENOSCOPY);  Surgeon: Aamir Rushing MD;  Location: [facility] ENDO;  Service: Endoscopy;  Laterality: N/A;    EXCISION, SMALL INTESTINE N/A 4/4/2023    Procedure: EXCISION, SMALL INTESTINE/SMALL BOWEL RESECTION;  Surgeon: Tyrone Martínez MD;  Location: [facility] OR;  Service: General;  Laterality: N/A;    INSERTION OF VENOUS ACCESS PORT Right 5/2/2023    Procedure: INSERTION, VENOUS ACCESS PORT;  Surgeon: Tyrone Martínez MD;  Location: [facility] OR;  Service: General;  Laterality: Right;    INTRALUMINAL GASTROINTESTINAL TRACT IMAGING VIA CAPSULE N/A 1/20/2023    Procedure: IMAGING PROCEDURE, GI TRACT, INTRALUMINAL, VIA CAPSULE;  Surgeon: Aamir Rushing MD;  Location: [facility] ENDO;  Service: Endoscopy;  Laterality: N/A;    LAPAROTOMY, EXPLORATORY N/A 4/4/2023    Procedure: LAPAROTOMY, EXPLORATORY;  Surgeon: Tyrone Martínez MD;  Location: [facility] OR;  Service: General;  Laterality: N/A;    SMALL BOWEL ENTEROSCOPY N/A 3/20/2023    Procedure: ENTEROSCOPY Upper DBE;  Surgeon: Giancarlo Cruz MD;  Location: [facility];  Service: Endoscopy;  Laterality: N/A;        Social History     Tobacco Use    Smoking status: Never    Smokeless tobacco: Never   Substance Use Topics    Alcohol use: Yes     Comment: daily use for years, stopped    Drug use: Never        Review of Systems   Constitutional:  Positive for fatigue. Negative for appetite change, fever and unexpected weight change.   HENT:  Negative for mouth sores.    Eyes:  Negative for visual disturbance.   Respiratory:  Negative for cough and shortness of breath.    Cardiovascular:  Negative for chest pain.   Gastrointestinal:  Negative for abdominal pain and diarrhea.   Genitourinary:  Negative for frequency.   Musculoskeletal:  Negative for back pain.   Integumentary:  Negative for rash.   Neurological:  Positive for numbness.  Negative for headaches.   Hematological:  Negative for adenopathy.   Psychiatric/Behavioral:  The patient is not nervous/anxious.           Objective       Vitals:    12/11/23 0824   BP: 135/76   Pulse: 76   SpO2: 96%   Weight: 93.8 kg (206 lb 12.7 oz)   Height: 6' (1.829 m)               Physical Exam  Constitutional:       Appearance: He is well-developed.   HENT:      Head: Normocephalic.      Mouth/Throat:      Pharynx: No oropharyngeal exudate.   Eyes:      General: No scleral icterus.        Right eye: No discharge.         Left eye: No discharge.      Conjunctiva/sclera: Conjunctivae normal.   Neck:      Thyroid: No thyromegaly.   Cardiovascular:      Rate and Rhythm: Normal rate and regular rhythm.      Heart sounds: Normal heart sounds. No murmur heard.  Pulmonary:      Effort: Pulmonary effort is normal.      Breath sounds: Normal breath sounds. No wheezing or rales.   Abdominal:      General: Bowel sounds are normal.      Palpations: Abdomen is soft.      Tenderness: There is no abdominal tenderness. There is no guarding or rebound.   Musculoskeletal:         General: No swelling. Normal range of motion.      Cervical back: Normal range of motion and neck supple.   Lymphadenopathy:      Cervical: No cervical adenopathy.      Upper Body:      Right upper body: No supraclavicular adenopathy.      Left upper body: No supraclavicular adenopathy.   Skin:     Findings: No erythema or rash.   Neurological:      Mental Status: He is alert and oriented to person, place, and time.      Cranial Nerves: No cranial nerve deficit.   Psychiatric:         Mood and Affect: Mood normal.         Behavior: Behavior normal.            Pathology 4/4/23  Small intestine, segmental resection:   - Invasive adenocarcinoma with partial mucinous component (40% of tumor)       - Tumor invades through muscularis propria into subserosa   - Lymphovascular invasion is present   - 17 lymph nodes, 3 positive for metastatic carcinoma (3/17)    - Resection margins free of tumor   - Background small intestinal mucosa without significant histopathologic alteration   - See CAP Tumor synoptic and comment     CAP Tumor Synoptic:   Procedure:  Segmental resection   Tumor site:  Small intestine, not otherwise specified   Histologic type:  Adenocarcinoma (not otherwise characterized), with mucinous component comprising approximately 40% of tumor   Histologic grade:  G2, moderately differentiated   Tumor size:  5.5 cm in greatest dimension   Tumor extent:  Invades through muscularis propria into subserosa without serosal penetration   Lymphovascular invasion:  Present   Macroscopic tumor perforation:  Not identified   Margin status for invasive carcinoma:  All margins negative for invasive carcinoma   Margin status for dysplasia:  All margins negative for carcinoma in situ (high-grade dysplasia)/ adenoma   Regional lymph node status:  Tumor present in regional lymph nodes   Number of lymph nodes with tumor:  3   Number of lymph nodes examined:  17   PATHOLOGIC STAGE CLASSIFICATION   TNM descriptors:  Not applicable   pT Category:  pT3   pN Category:  pN2   Special studies:  Intact expression of DNA mismatch repair proteins (MLH1, MSH2, MSH6, PMS2) in tumor by immunohistochemistry (performed on prior biopsy S-, collected 3/20/2023); interpreted by Dr. Garcias)   Comment:  Records from this patient's reported recent colonoscopy are not available for review.       CT a/p w/contrast 4/13/23  Impression:     Postoperative changes from partial small-bowel resection for a small bowel tumor.  Pathology results are pending.     Skin thickening and subcutaneous edema near the umbilicus, which could be inflammatory or infectious.  No organized fluid collections along the surgical incision.     Dilated small bowel loops with gradual tapering distally, likely ileus in the early postoperative setting.     Small volume ascites and trace pneumoperitoneum, likely  postoperative.  Anastomotic leak is less likely.  No organized intraperitoneal collections.     Other findings as described.        CT chest w/out contrast  5/1/23   Impression:     Asymmetric thyroid gland with possible left lobe thyroid nodule measuring up to 2.2 cm.  Further evaluation with thyroid ultrasound examination is recommended.     Mild centrilobular emphysematous changes.     Mucous plugging within the left upper lobe laterally and within the right lower lobe laterally.     Cluster of pulmonary nodules within the upper left upper lobe measuring up to 0.6 cm in size.  These are likely infectious/inflammatory in etiology, however continued surveillance is recommended in this patient with history of malignancy.     Hepatic cysts.         CT c/a/p 7/3/2023   Impression:     1. Postsurgical changes compatible with partial small bowel resection with enteroenteric anastomosis and surgical sutures in the region left upper quadrant, not significantly changed.  2. Near complete interval resolution of previously noted cluster of pulmonary nodules in the left upper lobe.  Cluster of approximately 3 ground-glass pulmonary micro nodules in the left upper lobe and, second cluster of approximately 3 ground-glass pulmonary micro nodules in the right lower lobe, not significantly changed.  A few borderline enlarged mediastinal lymph nodes, not significantly changed.  3. Multiple simple appearing hepatic parenchymal cyst and multiple additional subcentimeter hypodensities that are too small to accurately characterize, not significantly changed.  4. 0.4-cm parenchymal hypodensity in the pancreatic head, not significantly changed.  5. Simple appearing renal cortical cystic lesion in the lateral aspect of the right midpole, not significantly changed. Multiple additional subcentimeter renal cortical hypodensities bilaterally are too small to accurately characterize but not significantly changed when compared to  prior.        Lab Results   Component Value Date    WBC 3.67 (L) 12/08/2023    HGB 13.2 (L) 12/08/2023    HCT 40.7 12/08/2023    MCV 96 12/08/2023     (L) 12/08/2023       Hep B Core Total Ab Non-reactive Reactive Abnormal          Hepatitis B Surface Ag Non-reactive Non-reactive    Hep B C IgM Non-reactive Non-reactive    Hep A IgM Non-reactive Non-reactive    Hepatitis C Ab Non-reactive Non-reactive        Component      Latest Ref Rng 7/7/2023 7/24/2023 9/8/2023 11/7/2023   CEA      0.0 - 5.0 ng/mL 2.4  2.4  3.0  3.8      CT c/a/p w/ IV contrast 11/29/23  Impression:     1. Postsurgical changes compatible with partial small bowel resection with enteroenteric anastomosis and surgical sutures in the region left upper quadrant, not significantly changed.  2. Cluster of micro nodules in the bilateral upper lobes and right lower lobe and, a few borderline enlarged mediastinal lymph nodes, not significantly changed.  3. Multiple simple appearing hepatic parenchymal cyst and multiple additional subcentimeter hypodensities that are too small to accurately characterize, not significantly changed.  4. 0.4-cm parenchymal hypodensity in the pancreatic head, not significantly changed.  5. Simple appearing right midpole renal cortical cystic lesion urine, multiple additional subcentimeter renal cortical hypodensities bilaterally are too small to accurately characterize but not significantly changed when compared to p    Assessment and Plan     Problem List Items Addressed This Visit           Diagnoses and all orders for this visit:    Adenocarcinoma of small bowel  Poli Young is a 67 y.o. male with biopsy proven small bowel adenocarcinoma  status post small bowel resection on 4/4/23.pT3 pN2M0  CT a/p no evidence of distant mets  Adjuvant chemotherapy for 6 mo FOLFOX (folinic acid, fluorouracil, and oxaliplatin) recommended     CT imaging 7/3/2023 reviewed   S/p C12 adjuvant completed 11/22/23  Follow up CT imaging  11/29/23- INES recurrence  Consider ctDNA -signatera  Labs reviewed  Follow up in 2mos    NCCN guidelines:   H&P q3-6 mos for 2y, then q 6mos for a total of 5y  CEA q3-6mos for 2y, then q 6mos for total of 5y  Chest/abd/pelvic CT  q 6-12 mo for a total of 5y         Chemo-induced thrombocytopenia  Labs reviewed  Plt ct 117k on 12/8/23   Improved  Asymptomatic   monitor      Hep B core antibody positive   F/b  Hepatology  Cont Vemlidy    Thyroid nodule  US thyroid complete and nodule bx with benign results    Pulmonary nodules  Follow up CT imaging 11/29/23Cluster of micro nodules in the bilateral upper lobes and right lower lobe and, a few borderline enlarged mediastinal lymph nodes, not significantly changed.  Ambulatory Referral to Pulmonology    Chemotherapy-induced neuropathy  Stable   Cont neuropathy cream   Cont gabapentin 300mg po qhs       Antineoplastic chemotherapy induced anemia  Hb 13.2 g/dl on 12/8/23    Fe studies reviewed   Monitor        Advised to update flu immunizations  SIGNATERA TEST- PER ZYRA  Cbc,cmp, CEA  prior to f/u 6 WEEKS  REFERRAL TO PULMONOLOGY - PULM NODULES        I spent a total of  40  minutes on the day of the visit.This includes face to face time and non-face to face time preparing to see the patient (eg, review of tests), obtaining and/or reviewing separately obtained history, documenting clinical information in the electronic or other health record, independently interpreting results and communicating results to the patient/family/caregiver, and coordinating care.            Amparo Michaels MD  [facility]

## 2023-12-11 NOTE — TELEPHONE ENCOUNTER
Good morning,     Dr. Michaels has put in a referral for patient, please contact him at your convenience to scheduled an appointment.  Dx, Pulmonary Nodules    Thank you  MEJIA Johns

## 2023-12-12 DIAGNOSIS — T45.1X5A CHEMOTHERAPY-INDUCED THROMBOCYTOPENIA: ICD-10-CM

## 2023-12-12 DIAGNOSIS — D69.59 CHEMOTHERAPY-INDUCED THROMBOCYTOPENIA: ICD-10-CM

## 2023-12-12 DIAGNOSIS — C17.9 ADENOCARCINOMA OF SMALL BOWEL: Primary | ICD-10-CM

## 2023-12-17 PROBLEM — R52 PAIN: Status: ACTIVE | Noted: 2023-12-17

## 2023-12-17 PROBLEM — M47.812 CERVICAL SPONDYLOSIS: Status: ACTIVE | Noted: 2023-12-17

## 2024-02-08 ENCOUNTER — LAB VISIT (OUTPATIENT)
Dept: LAB | Facility: HOSPITAL | Age: 69
End: 2024-02-08
Attending: INTERNAL MEDICINE
Payer: MEDICARE

## 2024-02-08 DIAGNOSIS — T45.1X5A CHEMOTHERAPY-INDUCED THROMBOCYTOPENIA: ICD-10-CM

## 2024-02-08 DIAGNOSIS — C17.9 ADENOCARCINOMA OF SMALL BOWEL: ICD-10-CM

## 2024-02-08 DIAGNOSIS — D69.59 CHEMOTHERAPY-INDUCED THROMBOCYTOPENIA: ICD-10-CM

## 2024-02-08 LAB
ALBUMIN SERPL BCP-MCNC: 4 G/DL (ref 3.5–5.2)
ALP SERPL-CCNC: 109 U/L (ref 55–135)
ALT SERPL W/O P-5'-P-CCNC: 25 U/L (ref 10–44)
ANION GAP SERPL CALC-SCNC: 9 MMOL/L (ref 8–16)
AST SERPL-CCNC: 24 U/L (ref 10–40)
BASOPHILS # BLD AUTO: 0.02 K/UL (ref 0–0.2)
BASOPHILS NFR BLD: 0.6 % (ref 0–1.9)
BILIRUB SERPL-MCNC: 0.5 MG/DL (ref 0.1–1)
BUN SERPL-MCNC: 15 MG/DL (ref 8–23)
CALCIUM SERPL-MCNC: 9.5 MG/DL (ref 8.7–10.5)
CEA SERPL-MCNC: 2.7 NG/ML (ref 0–5)
CHLORIDE SERPL-SCNC: 110 MMOL/L (ref 95–110)
CO2 SERPL-SCNC: 22 MMOL/L (ref 23–29)
CREAT SERPL-MCNC: 1 MG/DL (ref 0.5–1.4)
DIFFERENTIAL METHOD BLD: ABNORMAL
EOSINOPHIL # BLD AUTO: 0.2 K/UL (ref 0–0.5)
EOSINOPHIL NFR BLD: 4.6 % (ref 0–8)
ERYTHROCYTE [DISTWIDTH] IN BLOOD BY AUTOMATED COUNT: 12.8 % (ref 11.5–14.5)
EST. GFR  (NO RACE VARIABLE): >60 ML/MIN/1.73 M^2
GLUCOSE SERPL-MCNC: 112 MG/DL (ref 70–110)
HCT VFR BLD AUTO: 39.4 % (ref 40–54)
HGB BLD-MCNC: 13.4 G/DL (ref 14–18)
IMM GRANULOCYTES # BLD AUTO: 0.01 K/UL (ref 0–0.04)
IMM GRANULOCYTES NFR BLD AUTO: 0.3 % (ref 0–0.5)
LYMPHOCYTES # BLD AUTO: 1.1 K/UL (ref 1–4.8)
LYMPHOCYTES NFR BLD: 33.1 % (ref 18–48)
MCH RBC QN AUTO: 31.5 PG (ref 27–31)
MCHC RBC AUTO-ENTMCNC: 34 G/DL (ref 32–36)
MCV RBC AUTO: 93 FL (ref 82–98)
MONOCYTES # BLD AUTO: 0.3 K/UL (ref 0.3–1)
MONOCYTES NFR BLD: 9.3 % (ref 4–15)
NEUTROPHILS # BLD AUTO: 1.7 K/UL (ref 1.8–7.7)
NEUTROPHILS NFR BLD: 52.1 % (ref 38–73)
NRBC BLD-RTO: 0 /100 WBC
PLATELET # BLD AUTO: 140 K/UL (ref 150–450)
PMV BLD AUTO: 8 FL (ref 9.2–12.9)
POTASSIUM SERPL-SCNC: 3.8 MMOL/L (ref 3.5–5.1)
PROT SERPL-MCNC: 7.7 G/DL (ref 6–8.4)
RBC # BLD AUTO: 4.25 M/UL (ref 4.6–6.2)
SODIUM SERPL-SCNC: 141 MMOL/L (ref 136–145)
WBC # BLD AUTO: 3.23 K/UL (ref 3.9–12.7)

## 2024-02-08 PROCEDURE — 82378 CARCINOEMBRYONIC ANTIGEN: CPT | Performed by: INTERNAL MEDICINE

## 2024-02-08 PROCEDURE — 85025 COMPLETE CBC W/AUTO DIFF WBC: CPT | Performed by: INTERNAL MEDICINE

## 2024-02-08 PROCEDURE — 36415 COLL VENOUS BLD VENIPUNCTURE: CPT | Performed by: INTERNAL MEDICINE

## 2024-02-08 PROCEDURE — 80053 COMPREHEN METABOLIC PANEL: CPT | Performed by: INTERNAL MEDICINE

## 2024-02-12 ENCOUNTER — OFFICE VISIT (OUTPATIENT)
Dept: HEMATOLOGY/ONCOLOGY | Facility: CLINIC | Age: 69
End: 2024-02-12
Payer: MEDICARE

## 2024-02-12 VITALS
HEART RATE: 75 BPM | BODY MASS INDEX: 28.37 KG/M2 | SYSTOLIC BLOOD PRESSURE: 156 MMHG | WEIGHT: 209.44 LBS | HEIGHT: 72 IN | DIASTOLIC BLOOD PRESSURE: 78 MMHG | OXYGEN SATURATION: 97 %

## 2024-02-12 DIAGNOSIS — D69.6 THROMBOCYTOPENIA: ICD-10-CM

## 2024-02-12 DIAGNOSIS — D64.9 ANEMIA, UNSPECIFIED TYPE: ICD-10-CM

## 2024-02-12 DIAGNOSIS — G62.0 CHEMOTHERAPY-INDUCED NEUROPATHY: ICD-10-CM

## 2024-02-12 DIAGNOSIS — C17.9 ADENOCARCINOMA OF SMALL BOWEL: Primary | ICD-10-CM

## 2024-02-12 DIAGNOSIS — E04.1 THYROID NODULE: ICD-10-CM

## 2024-02-12 DIAGNOSIS — T45.1X5A CHEMOTHERAPY-INDUCED NEUROPATHY: ICD-10-CM

## 2024-02-12 DIAGNOSIS — R76.8 HEPATITIS B CORE ANTIBODY POSITIVE: ICD-10-CM

## 2024-02-12 PROCEDURE — 99214 OFFICE O/P EST MOD 30 MIN: CPT | Mod: S$GLB,,, | Performed by: INTERNAL MEDICINE

## 2024-02-12 PROCEDURE — 99999 PR PBB SHADOW E&M-EST. PATIENT-LVL IV: CPT | Mod: PBBFAC,,, | Performed by: INTERNAL MEDICINE

## 2024-02-12 NOTE — PROGRESS NOTES
Subjective     Patient ID: Poli Young is a 68 y.o. male.    Chief Complaint: No chief complaint on file.  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 Heunderwent work up for anemia and was found to have a small bowel mass about 100cm from IC valve by enteroscopy by Dr Cruz. Got one blood transfusion. He reports history of left central intermittent abdominal pain for several months. No NV. Was found to be anemic on bloodwork. EGD in June 2022 did not show any obvious cause of bleeding.  Capsule endoscopy in January showed blood in small bowel. Enteroscopy showed mass in March that was biopsied and tattooed. Biopsy positive for adenocarcinoma. He is status post small bowel resection on 4/4/23. Pathology positive for  Invasive adenocarcinoma with partial mucinous component (40% of tumor) Lymphovascular invasion is present 17 lymph nodes, 3 positive for metastatic carcinoma (3/17) .Resection margins free of tumor  Pathologic stage pT3 pN Category:  pN2 . Today , he is doing well. Denies dizziness, weakness. No CP, SOB.Appetite improving postop.CT a/p w/contrast 4/13/23 shows Postoperative changes from partial small-bowel resection for a small bowel tumor . No evidence of distant metastases.      Interval Hx: Doing well  S/p C12 completed 11/22/23  No new issues   No SOB/CP  No melena, hematochezia or change in bowel habits  Cont with minor intermittent tingling/numbness in extremities    Workup for abnormal transaminases shows positive Hep B core ab   Taking Vemlidy     Sochx: Retired . Never smoked. Drinks etoh occasionally.   .       Past Medical History:   Diagnosis Date    Hyperlipemia     Hypertension        Past Surgical History:   Procedure Laterality Date    DIAGNOSTIC LAPAROSCOPY N/A 4/4/2023    Procedure:  LAPAROSCOPY, DIAGNOSTIC;  Surgeon: Tyrone Martínez MD;  Location: [facility] OR;  Service: General;  Laterality: N/A;    ESOPHAGOGASTRODUODENOSCOPY N/A 6/8/2022    Procedure: EGD (ESOPHAGOGASTRODUODENOSCOPY);  Surgeon: Aamir Rushing MD;  Location: [facility] ENDO;  Service: Endoscopy;  Laterality: N/A;    EXCISION, SMALL INTESTINE N/A 4/4/2023    Procedure: EXCISION, SMALL INTESTINE/SMALL BOWEL RESECTION;  Surgeon: Tyrone Martínez MD;  Location: [facility] OR;  Service: General;  Laterality: N/A;    INSERTION OF VENOUS ACCESS PORT Right 5/2/2023    Procedure: INSERTION, VENOUS ACCESS PORT;  Surgeon: Tyrone Martínez MD;  Location: [facility];  Service: General;  Laterality: Right;    INTRALUMINAL GASTROINTESTINAL TRACT IMAGING VIA CAPSULE N/A 1/20/2023    Procedure: IMAGING PROCEDURE, GI TRACT, INTRALUMINAL, VIA CAPSULE;  Surgeon: Aamir Rushing MD;  Location: [facility];  Service: Endoscopy;  Laterality: N/A;    LAPAROTOMY, EXPLORATORY N/A 4/4/2023    Procedure: LAPAROTOMY, EXPLORATORY;  Surgeon: Tyrone Martínez MD;  Location: [facility] OR;  Service: General;  Laterality: N/A;    SMALL BOWEL ENTEROSCOPY N/A 3/20/2023    Procedure: ENTEROSCOPY Upper DBE;  Surgeon: Giancarlo Cruz MD;  Location: [facility];  Service: Endoscopy;  Laterality: N/A;        Social History     Tobacco Use    Smoking status: Never    Smokeless tobacco: Never   Substance Use Topics    Alcohol use: Yes     Comment: daily use for years, stopped    Drug use: Never        Review of Systems   Constitutional:  Positive for fatigue. Negative for appetite change, fever and unexpected weight change.   HENT:  Negative for mouth sores.    Eyes:  Negative for visual disturbance.   Respiratory:  Negative for cough and shortness of breath.    Cardiovascular:  Negative for chest pain.   Gastrointestinal:  Negative for abdominal pain and diarrhea.   Genitourinary:  Negative for frequency.   Musculoskeletal:  Negative for back pain.   Integumentary:  Negative for  rash.   Neurological:  Positive for numbness. Negative for headaches.   Hematological:  Negative for adenopathy.   Psychiatric/Behavioral:  The patient is not nervous/anxious.           Objective     Vitals:    02/12/24 1016   BP: (!) 156/78   Pulse: 75   SpO2: 97%   Weight: 95 kg (209 lb 7 oz)   Height: 6' (1.829 m)         Physical Exam  Constitutional:       Appearance: He is well-developed.   HENT:      Head: Normocephalic.      Mouth/Throat:      Pharynx: No oropharyngeal exudate.   Eyes:      General: No scleral icterus.        Right eye: No discharge.         Left eye: No discharge.      Conjunctiva/sclera: Conjunctivae normal.   Neck:      Thyroid: No thyromegaly.   Cardiovascular:      Rate and Rhythm: Normal rate and regular rhythm.      Heart sounds: Normal heart sounds. No murmur heard.  Pulmonary:      Effort: Pulmonary effort is normal.      Breath sounds: Normal breath sounds. No wheezing or rales.   Abdominal:      General: Bowel sounds are normal.      Palpations: Abdomen is soft.      Tenderness: There is no abdominal tenderness. There is no guarding or rebound.   Musculoskeletal:         General: No swelling. Normal range of motion.      Cervical back: Normal range of motion and neck supple.   Lymphadenopathy:      Cervical: No cervical adenopathy.      Upper Body:      Right upper body: No supraclavicular adenopathy.      Left upper body: No supraclavicular adenopathy.   Skin:     Findings: No erythema or rash.   Neurological:      Mental Status: He is alert and oriented to person, place, and time.      Cranial Nerves: No cranial nerve deficit.   Psychiatric:         Mood and Affect: Mood normal.         Behavior: Behavior normal.            Pathology 4/4/23  Small intestine, segmental resection:   - Invasive adenocarcinoma with partial mucinous component (40% of tumor)       - Tumor invades through muscularis propria into subserosa   - Lymphovascular invasion is present   - 17 lymph nodes, 3  positive for metastatic carcinoma (3/17)   - Resection margins free of tumor   - Background small intestinal mucosa without significant histopathologic alteration   - See CAP Tumor synoptic and comment     CAP Tumor Synoptic:   Procedure:  Segmental resection   Tumor site:  Small intestine, not otherwise specified   Histologic type:  Adenocarcinoma (not otherwise characterized), with mucinous component comprising approximately 40% of tumor   Histologic grade:  G2, moderately differentiated   Tumor size:  5.5 cm in greatest dimension   Tumor extent:  Invades through muscularis propria into subserosa without serosal penetration   Lymphovascular invasion:  Present   Macroscopic tumor perforation:  Not identified   Margin status for invasive carcinoma:  All margins negative for invasive carcinoma   Margin status for dysplasia:  All margins negative for carcinoma in situ (high-grade dysplasia)/ adenoma   Regional lymph node status:  Tumor present in regional lymph nodes   Number of lymph nodes with tumor:  3   Number of lymph nodes examined:  17   PATHOLOGIC STAGE CLASSIFICATION   TNM descriptors:  Not applicable   pT Category:  pT3   pN Category:  pN2   Special studies:  Intact expression of DNA mismatch repair proteins (MLH1, MSH2, MSH6, PMS2) in tumor by immunohistochemistry (performed on prior biopsy KES-, collected 3/20/2023); interpreted by Dr. Garcias)   Comment:  Records from this patient's reported recent colonoscopy are not available for review.       CT a/p w/contrast 4/13/23  Impression:     Postoperative changes from partial small-bowel resection for a small bowel tumor.  Pathology results are pending.     Skin thickening and subcutaneous edema near the umbilicus, which could be inflammatory or infectious.  No organized fluid collections along the surgical incision.     Dilated small bowel loops with gradual tapering distally, likely ileus in the early postoperative setting.     Small volume ascites  and trace pneumoperitoneum, likely postoperative.  Anastomotic leak is less likely.  No organized intraperitoneal collections.     Other findings as described.        CT chest w/out contrast  5/1/23   Impression:     Asymmetric thyroid gland with possible left lobe thyroid nodule measuring up to 2.2 cm.  Further evaluation with thyroid ultrasound examination is recommended.     Mild centrilobular emphysematous changes.     Mucous plugging within the left upper lobe laterally and within the right lower lobe laterally.     Cluster of pulmonary nodules within the upper left upper lobe measuring up to 0.6 cm in size.  These are likely infectious/inflammatory in etiology, however continued surveillance is recommended in this patient with history of malignancy.     Hepatic cysts.         CT c/a/p 7/3/2023   Impression:     1. Postsurgical changes compatible with partial small bowel resection with enteroenteric anastomosis and surgical sutures in the region left upper quadrant, not significantly changed.  2. Near complete interval resolution of previously noted cluster of pulmonary nodules in the left upper lobe.  Cluster of approximately 3 ground-glass pulmonary micro nodules in the left upper lobe and, second cluster of approximately 3 ground-glass pulmonary micro nodules in the right lower lobe, not significantly changed.  A few borderline enlarged mediastinal lymph nodes, not significantly changed.  3. Multiple simple appearing hepatic parenchymal cyst and multiple additional subcentimeter hypodensities that are too small to accurately characterize, not significantly changed.  4. 0.4-cm parenchymal hypodensity in the pancreatic head, not significantly changed.  5. Simple appearing renal cortical cystic lesion in the lateral aspect of the right midpole, not significantly changed. Multiple additional subcentimeter renal cortical hypodensities bilaterally are too small to accurately characterize but not significantly  changed when compared to prior.        Lab Results   Component Value Date    WBC 3.23 (L) 02/08/2024    HGB 13.4 (L) 02/08/2024    HCT 39.4 (L) 02/08/2024    MCV 93 02/08/2024     (L) 02/08/2024       Hep B Core Total Ab Non-reactive Reactive Abnormal          Hepatitis B Surface Ag Non-reactive Non-reactive    Hep B C IgM Non-reactive Non-reactive    Hep A IgM Non-reactive Non-reactive    Hepatitis C Ab Non-reactive Non-reactive        Component      Latest Ref Rng 7/7/2023 7/24/2023 9/8/2023 11/7/2023   CEA      0.0 - 5.0 ng/mL 2.4  2.4  3.0  3.8       Latest Reference Range & Units 12/08/23 09:51 02/08/24 09:34   CEA 0.0 - 5.0 ng/mL 3.2 2.7       CT c/a/p w/ IV contrast 11/29/23  Impression:     1. Postsurgical changes compatible with partial small bowel resection with enteroenteric anastomosis and surgical sutures in the region left upper quadrant, not significantly changed.  2. Cluster of micro nodules in the bilateral upper lobes and right lower lobe and, a few borderline enlarged mediastinal lymph nodes, not significantly changed.  3. Multiple simple appearing hepatic parenchymal cyst and multiple additional subcentimeter hypodensities that are too small to accurately characterize, not significantly changed.  4. 0.4-cm parenchymal hypodensity in the pancreatic head, not significantly changed.  5. Simple appearing right midpole renal cortical cystic lesion urine, multiple additional subcentimeter renal cortical hypodensities bilaterally are too small to accurately characterize but not significantly changed when compared to p    Assessment and Plan     Problem List Items Addressed This Visit           Diagnoses and all orders for this visit:    Adenocarcinoma of small bowel  Poli Young is a 67 y.o. male with biopsy proven small bowel adenocarcinoma  status post small bowel resection on 4/4/23.pT3 pN2M0  CT a/p no evidence of distant mets  Adjuvant chemotherapy for 6 mo FOLFOX (folinic acid,  fluorouracil, and oxaliplatin) recommended     CT imaging 7/3/2023 reviewed   S/p C12 adjuvant completed 11/22/23  Follow up CT imaging 11/29/23- INES recurrence  Consider ctDNA -signatera  Labs reviewed  Follow up in 2mos    Follow up on SIGNATERA ctDNA testing  Plan follow up imaging studies May 2024  NCCN guidelines:   H&P q3-6 mos for 2y, then q 6mos for a total of 5y  CEA q3-6mos for 2y, then q 6mos for total of 5y  Chest/abd/pelvic CT  q 6-12 mo for a total of 5y         Thrombocytopenia  Labs reviewed  Plt ct 140k on 2/8/24   Improved  Asymptomatic   monitor      Hep B core antibody positive   F/b  Hepatology  Cont Vemlidy    Thyroid nodule  US thyroid complete and nodule bx with benign results    Pulmonary nodules  Follow up CT imaging 11/29/23-Cluster of micro nodules in the bilateral upper lobes and right lower lobe and, a few borderline enlarged mediastinal lymph nodes, not significantly changed.  Ambulatory Referral to Pulmonology    Chemotherapy-induced neuropathy  Stable   Cont neuropathy cream   Cont gabapentin 300mg po qhs       Anemia  Hb 13.4 g/dl on  2/8/24   Fe studies reviewed   Monitor          SIGNATERA TEST- PER [facility]  CT FIRST WEEK OF MAY  FOLLOW UP IN MAY AFTER CT  Cbc,cmp, CEA , fE STUDIES  prior to f/u 3MO  REFERRAL TO PULMONOLOGY - PULM NODULES        I spent a total of  40  minutes on the day of the visit.This includes face to face time and non-face to face time preparing to see the patient (eg, review of tests), obtaining and/or reviewing separately obtained history, documenting clinical information in the electronic or other health record, independently interpreting results and communicating results to the patient/family/caregiver, and coordinating care.            Amparo Michaels MD  Heme/Onc [facility]

## 2024-02-12 NOTE — Clinical Note
SIGNATERA TEST- PER [facility] CT FIRST WEEK OF MAY FOLLOW UP IN MAY AFTER CT Cbc,cmp, CEA , fE STUDIES  prior to f/u 3MO

## 2024-02-27 ENCOUNTER — OFFICE VISIT (OUTPATIENT)
Dept: HEPATOLOGY | Facility: CLINIC | Age: 69
End: 2024-02-27
Payer: MEDICARE

## 2024-02-27 ENCOUNTER — PROCEDURE VISIT (OUTPATIENT)
Dept: HEPATOLOGY | Facility: CLINIC | Age: 69
End: 2024-02-27
Payer: MEDICARE

## 2024-02-27 VITALS — WEIGHT: 209.19 LBS | BODY MASS INDEX: 28.33 KG/M2 | HEIGHT: 72 IN

## 2024-02-27 DIAGNOSIS — R76.8 HEPATITIS B CORE ANTIBODY POSITIVE: Primary | ICD-10-CM

## 2024-02-27 DIAGNOSIS — R74.8 ABNORMAL TRANSAMINASES: ICD-10-CM

## 2024-02-27 DIAGNOSIS — K74.00 LIVER FIBROSIS: ICD-10-CM

## 2024-02-27 DIAGNOSIS — K76.89 LIVER CYST: ICD-10-CM

## 2024-02-27 DIAGNOSIS — C17.9 ADENOCARCINOMA OF SMALL BOWEL: ICD-10-CM

## 2024-02-27 PROCEDURE — 99214 OFFICE O/P EST MOD 30 MIN: CPT | Mod: S$GLB,,, | Performed by: NURSE PRACTITIONER

## 2024-02-27 PROCEDURE — 99999 PR PBB SHADOW E&M-EST. PATIENT-LVL III: CPT | Mod: PBBFAC,,, | Performed by: NURSE PRACTITIONER

## 2024-02-27 PROCEDURE — 91200 LIVER ELASTOGRAPHY: CPT | Mod: S$GLB,,, | Performed by: NURSE PRACTITIONER

## 2024-02-27 RX ORDER — SILDENAFIL 50 MG/1
50 TABLET, FILM COATED ORAL DAILY PRN
COMMUNITY
Start: 2024-01-31

## 2024-02-27 RX ORDER — TENOFOVIR ALAFENAMIDE 25 MG/1
25 TABLET ORAL DAILY
Qty: 30 TABLET | Refills: 11 | Status: ACTIVE | OUTPATIENT
Start: 2024-02-27

## 2024-02-27 NOTE — PROCEDURES
FibroScan [place] (Vibration Controlled Transient Elastography)    Date/Time: 2/27/2024 10:30 AM    Performed by: Noemy Barcenas NP  Authorized by: Noemy Barcenas NP    Diagnosis:  Other    Probe:  M    Universal Protocol: Patient's identity, procedure and site were verified, confirmatory pause was performed.  Discussed procedure including risks and potential complications.  Questions answered.  Patient verbalizes understanding and wishes to proceed with VCTE.     Procedure: After providing explanations of the procedure, patient was placed in the supine position with right arm in maximum abduction to allow optimal exposure of right lateral abdomen.  Patient was briefly assessed, Testing was performed in the mid-axillary location, 50Hz Shear Wave pulses were applied and the resulting Shear Wave and Propagation Speed detected with a 3.5 MHz ultrasonic signal, using the FibroScan probe, Skin to liver capsule distance and liver parenchyma were accessed during the entire examination with the FibroScan probe, Patient was instructed to breathe normally and to abstain from sudden movements during the procedure, allowing for random measurements of liver stiffness. At least 10 Shear Waves were produced, Individual measurements of each Shear Wave were calculated.  Patient tolerated the procedure well with no complications.  Meets discharge criteria as was dismissed.  Rates pain 0 out of 10.  Patient will follow up with ordering provider to review results.    Findings  Median liver stiffness score:  5.1  CAP Reading: dB/m:  207    IQR/med %:  14  Interpretation  Fibrosis interpretation is based on medial liver stiffness - Kilopascal (kPa).    Fibrosis Stage:  F 0-1  Steatosis interpretation is based on controlled attenuation parameter - (dB/m).    Steatosis Grade:  <S1

## 2024-02-27 NOTE — PATIENT INSTRUCTIONS
Repeat fibroscan looks great and is normal - great news!  Labs normalized  Continue Vemlidy 25 mg daily for Hep B protection until next year. If no issues at next visit, can stop it   US in 1 year for liver cysts

## 2024-02-27 NOTE — PROGRESS NOTES
[facility] VISIT FOLLOW UP NOTE    PCP: Rola Feliz FNP     CHIEF COMPLAINT: Hep B core antibody positive, liver cyst     HPI: This is a 68 y.o. male with PMH noted below, presenting for follow up of above    Serological workup was negative for hemochromatosis, Lebron's, alpha-1 antitrypsin deficiency,  and viral hepatitis A, B and C   Negative AMA    Prior serologic workup:   Lab Results   Component Value Date    AMAIFA Negative 1:40 11/20/2023    FERRITIN 166 02/20/2024    FESATURATED 21 02/20/2024    CERULOPLSM 27.0 11/20/2023    HEPBSAG Non-reactive 02/20/2024    HEPCAB Non-reactive 08/04/2023    HEPAIGM Non-reactive 08/04/2023     Liver fibrosis staging  -- fibroscan 11/2023 noted F2, S0 (kPA 9.7, )  -- fibroscan 2/2024 noted F0, S0 (kPA 5.1, )      Interval HPI: Presents today with wife. + Hep B core, on Vemlidy for Hep B prophylaxis given h/o chemo for small bowel cancer  Of note: being followed by Dr. Michaels, hem/onc for adenocarcinoma of small bowel, Last dose of chemo 11/2023, can stop Vemlidy 5/2025  Sero w/u labs negative, alk phos and liver enzymes and fibroscan all returned to normal     Labs done 2/2024 note normal alk phos       Lab Results   Component Value Date    ALT 19 02/20/2024    AST 23 02/20/2024    ALKPHOS 110 02/20/2024    BILITOT 0.6 02/20/2024    ALBUMIN 3.9 02/20/2024    INR 1.0 06/08/2022     02/20/2024       CT done 7/2023 noted Multiple simple appearing hepatic parenchymal cyst and multiple additional subcentimeter hypodensities that are too small to accurately characterize, not significantly change.  Otherwise, the liver is normal in size, morphology and attenuation without appreciable surface nodularity    US 11/2023 noted normal liver and echotexture. Multiple hepatic cysts present throughout the liver largest of which in the LEFT hepatic lobe 5.2 x 3.3 x 5.9 cm and largest of which in the RIGHT hepatic lobe is 6.1 x 3.8 x 4.9 cm.       Lab  Results   Component Value Date    AFP <2.0 11/20/2023       +Immunity to Hep B: needs Hep A vaccine,  previously sent to [facility] pharm and ID          Allergy and medication list reviewed and updated     PMHX:  has a past medical history of Hyperlipemia and Hypertension.    PSHX:  has a past surgical history that includes Esophagogastroduodenoscopy (N/A, 6/8/2022); Intraluminal gastrointestinal tract imaging via capsule (N/A, 1/20/2023); Small bowel enteroscopy (N/A, 3/20/2023); Diagnostic laparoscopy (N/A, 4/4/2023); excision, small intestine (N/A, 4/4/2023); laparotomy, exploratory (N/A, 4/4/2023); and Insertion of venous access port (Right, 5/2/2023).    FAMILY HISTORY: Updated and reviewed in [facility]    SOCIAL HISTORY:   Social History     Substance and Sexual Activity   Alcohol Use Yes    Comment: daily use for years, stopped       Social History     Substance and Sexual Activity   Drug Use Never       ROS:   GENERAL: Denies fatigue  CARDIOVASCULAR: Denies edema  GI: Denies abdominal pain  SKIN: Denies rash, itching   NEURO: Denies confusion, memory loss, or mood changes    PHYSICAL EXAM:   Friendly Black or  male, in no acute distress; alert and oriented to person, place and time  VITALS: Ht 6' (1.829 m)   Wt 94.9 kg (209 lb 3.5 oz)   BMI 28.37 kg/m²   EYES: Sclerae anicteric  GI: Soft, non-tender, non-distended. No ascites.  EXTREMITIES:  No edema.  SKIN: Warm and dry. No jaundice. No telangectasias noted. No palmar erythema.  NEURO:  No asterixis.  PSYCH:  Thought and speech pattern appropriate. Behavior normal      EDUCATION:  See instructions discussed with patient in Instructions section of the After Visit Summary     ASSESSMENT & PLAN:  68 y.o. male with:  1. Elevated alk phos  -- sero w/u negative, returned to normal     2. Hep B core positive Ab, no chronic Hep B  -- on Vemlidy for prophylaxis given h/o chemo use. Continue until 2025, can stop after next visit if no immunosuppression in the  meantime   -- transaminases returned to normal  -- Hep B labs: see HPI  -- synthetic liver function WNL  -- immunity to Hep A: see HPI    3. H/o adenocarcinoma of small bowel,  Chemo use  -- continue Hep B prophylaxis, Vemlidy 25 mg daily given recent chemo use  -- will continue Hep B prophylaxis for 12-18 months after last dose of chemo (May 2025)   labs (Hep B DNA and CMP) for at least 12 months after last dose of Hep B prophylaxis    4. Liver cysts  Given size, can repeat US in 1 year             Follow up in about 1 year (around 2/27/2025). with labs and US before  Orders Placed This Encounter   Procedures    US Abdomen Complete    Hepatitis B Surface Antigen    HEPATITIS B VIRAL DNA, QUANTITATIVE    Comprehensive Metabolic Panel        Thank you for allowing me to participate in the care of ANU Aguilar    I spent a total of 30 minutes on the day of the visit.This includes face to face time and non-face to face time preparing to see the patient (eg, review of tests), obtaining and/or reviewing separately obtained history, documenting clinical information in the electronic or other health record, independently interpreting results and communicating results to the patient/family/caregiver, and coordinating care.         CC'ed note to:

## 2024-03-11 NOTE — TELEPHONE ENCOUNTER
Left a message on VM to call the clinic back. Patient needs a VCE.    Please see the attached refill request.

## 2024-03-27 DIAGNOSIS — G62.0 CHEMOTHERAPY-INDUCED NEUROPATHY: ICD-10-CM

## 2024-03-27 DIAGNOSIS — T45.1X5A CHEMOTHERAPY-INDUCED NEUROPATHY: ICD-10-CM

## 2024-03-27 RX ORDER — GABAPENTIN 300 MG/1
300 CAPSULE ORAL 2 TIMES DAILY
Qty: 60 CAPSULE | Refills: 1 | Status: SHIPPED | OUTPATIENT
Start: 2024-03-27 | End: 2024-05-14 | Stop reason: SDUPTHER

## 2024-04-17 ENCOUNTER — OFFICE VISIT (OUTPATIENT)
Dept: PULMONOLOGY | Facility: CLINIC | Age: 69
End: 2024-04-17
Payer: MEDICARE

## 2024-04-17 VITALS
BODY MASS INDEX: 29.42 KG/M2 | HEIGHT: 72 IN | SYSTOLIC BLOOD PRESSURE: 130 MMHG | WEIGHT: 217.25 LBS | DIASTOLIC BLOOD PRESSURE: 78 MMHG | HEART RATE: 71 BPM | OXYGEN SATURATION: 95 %

## 2024-04-17 DIAGNOSIS — R09.81 NASAL CONGESTION: ICD-10-CM

## 2024-04-17 DIAGNOSIS — R91.8 PULMONARY NODULES: ICD-10-CM

## 2024-04-17 DIAGNOSIS — T45.1X5A IMMUNODEFICIENCY SECONDARY TO CHEMOTHERAPY: Primary | ICD-10-CM

## 2024-04-17 DIAGNOSIS — Z79.899 IMMUNODEFICIENCY SECONDARY TO CHEMOTHERAPY: Primary | ICD-10-CM

## 2024-04-17 DIAGNOSIS — R91.1 PULMONARY NODULE: ICD-10-CM

## 2024-04-17 DIAGNOSIS — D84.821 IMMUNODEFICIENCY SECONDARY TO CHEMOTHERAPY: Primary | ICD-10-CM

## 2024-04-17 PROBLEM — Z79.69 IMMUNODEFICIENCY SECONDARY TO CHEMOTHERAPY: Status: ACTIVE | Noted: 2024-04-17

## 2024-04-17 PROCEDURE — 3078F DIAST BP <80 MM HG: CPT | Mod: CPTII,S$GLB,, | Performed by: INTERNAL MEDICINE

## 2024-04-17 PROCEDURE — 3288F FALL RISK ASSESSMENT DOCD: CPT | Mod: CPTII,S$GLB,, | Performed by: INTERNAL MEDICINE

## 2024-04-17 PROCEDURE — 1125F AMNT PAIN NOTED PAIN PRSNT: CPT | Mod: CPTII,S$GLB,, | Performed by: INTERNAL MEDICINE

## 2024-04-17 PROCEDURE — 1159F MED LIST DOCD IN RCRD: CPT | Mod: CPTII,S$GLB,, | Performed by: INTERNAL MEDICINE

## 2024-04-17 PROCEDURE — 3008F BODY MASS INDEX DOCD: CPT | Mod: CPTII,S$GLB,, | Performed by: INTERNAL MEDICINE

## 2024-04-17 PROCEDURE — 99204 OFFICE O/P NEW MOD 45 MIN: CPT | Mod: S$GLB,,, | Performed by: INTERNAL MEDICINE

## 2024-04-17 PROCEDURE — 1101F PT FALLS ASSESS-DOCD LE1/YR: CPT | Mod: CPTII,S$GLB,, | Performed by: INTERNAL MEDICINE

## 2024-04-17 PROCEDURE — 4010F ACE/ARB THERAPY RXD/TAKEN: CPT | Mod: CPTII,S$GLB,, | Performed by: INTERNAL MEDICINE

## 2024-04-17 PROCEDURE — 99999 PR PBB SHADOW E&M-EST. PATIENT-LVL IV: CPT | Mod: PBBFAC,,, | Performed by: INTERNAL MEDICINE

## 2024-04-17 PROCEDURE — 3075F SYST BP GE 130 - 139MM HG: CPT | Mod: CPTII,S$GLB,, | Performed by: INTERNAL MEDICINE

## 2024-04-17 NOTE — PROGRESS NOTES
Poli Young  was seen as a new patient at the request  Amparo Michaels MD for the evaluation of  pulmonary nodules.    CHIEF COMPLAINT:  Pulmonary Nodules      HISTORY OF PRESENT ILLNESS: Poli Young is a 68 y.o. male  has a past medical history of Hyperlipemia and Hypertension.  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 Michaels.  Surveillence CT 11/29/23 with clusters of micro nodules bilaterally.  Patient was referred to pulmonary for further inputs.      Patient denied fever/chills.  No coughing or wheezing.  Routinely walk 1/2 mile about once per week without issue.  Still mowing grass.  No chest pain.  No orthopnea.  No pnd.  No hemoptysis.  Gained most of the weight that he lost with cancer treatment.      Additional Pulmonary History:   Occupational/Environmental Exposures:  construction, sandblaster, and   Exposure to Animals/Pets:  1 dog  Foreign Travel History:  denied  History of exposures to TB:  denied   Family History of Lung Cancer:  denied   Tobacco:  never  Childhood history of Lung Disease:  denied  Chest surgery or trauma:  denied      PAST MEDICAL HISTORY:    Active Ambulatory Problems     Diagnosis Date Noted    Acute upper gastrointestinal hemorrhage 06/07/2022    Iron deficiency anemia due to chronic blood loss 06/08/2022    Small bowel mass 04/04/2023    Adenocarcinoma of small bowel 04/26/2023    Anemia 04/26/2023    Iron deficiency anemia 05/15/2023    Cyst of pancreas 02/22/2023    Essential hypertension 12/31/2018    Hyperlipidemia 12/31/2018    Liver cyst 02/22/2023    Multiple renal cysts 02/22/2023    Hepatitis B core antibody positive 08/28/2023    Cervical spondylosis 12/17/2023    Pain 12/17/2023    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    Pulmonary nodule 04/17/2024    Nasal congestion 04/17/2024     Resolved Ambulatory Problems     Diagnosis Date Noted    No Resolved Ambulatory Problems      Past Medical History:   Diagnosis Date    Hyperlipemia     Hypertension                 PAST SURGICAL HISTORY:    Past Surgical History:   Procedure Laterality Date    DIAGNOSTIC LAPAROSCOPY N/A 4/4/2023    Procedure: LAPAROSCOPY, DIAGNOSTIC;  Surgeon: Tyrone Martínez MD;  Location: [facility];  Service: General;  Laterality: N/A;    ESOPHAGOGASTRODUODENOSCOPY N/A 6/8/2022    Procedure: EGD (ESOPHAGOGASTRODUODENOSCOPY);  Surgeon: Aamir Rushing MD;  Location: [facility];  Service: Endoscopy;  Laterality: N/A;    EXCISION, SMALL INTESTINE N/A 4/4/2023    Procedure: EXCISION, SMALL INTESTINE/SMALL BOWEL RESECTION;  Surgeon: Tyrone Martínez MD;  Location: [facility];  Service: General;  Laterality: N/A;    INSERTION OF VENOUS ACCESS PORT Right 5/2/2023    Procedure: INSERTION, VENOUS ACCESS PORT;  Surgeon: Tyrone Martínez MD;  Location: [facility];  Service: General;  Laterality: Right;    INTRALUMINAL GASTROINTESTINAL TRACT IMAGING VIA CAPSULE N/A 1/20/2023    Procedure: IMAGING PROCEDURE, GI TRACT, INTRALUMINAL, VIA CAPSULE;  Surgeon: Aamir Rushing MD;  Location: [facility];  Service: Endoscopy;  Laterality: N/A;    LAPAROTOMY, EXPLORATORY N/A 4/4/2023    Procedure: LAPAROTOMY, EXPLORATORY;  Surgeon: Tyrone Martínez MD;  Location: [facility];  Service: General;  Laterality: N/A;    SMALL BOWEL ENTEROSCOPY N/A 3/20/2023    Procedure: ENTEROSCOPY Upper DBE;  Surgeon: Giancarlo Cruz MD;  Location: [facility];  Service: Endoscopy;  Laterality: N/A;         FAMILY HISTORY:                No family history on file.    SOCIAL HISTORY:          Tobacco:   Social History     Tobacco Use   Smoking Status Never   Smokeless Tobacco Never     alcohol use:    Social History     Substance and Sexual Activity   Alcohol Use Yes    Comment: daily use for years, stopped                   ALLERGIES:  Review of patient's allergies indicates:  No Known Allergies    CURRENT MEDICATIONS:    Current Outpatient Medications    Medication Sig Dispense Refill    amLODIPine (NORVASC) 10 MG tablet Take 10 mg by mouth once daily.      aspirin (ECOTRIN) 81 MG EC tablet Take 81 mg by mouth.      atorvastatin (LIPITOR) 10 MG tablet Take 10 mg by mouth once daily.      dexAMETHasone (DECADRON) 4 MG Tab Take 2 tablets (8 mg total) by mouth once daily. Take as directed on days 2 and 3 of your chemotherapy cycle. 24 tablet 5    fluticasone propionate (FLONASE) 50 mcg/actuation nasal spray 1 spray by Each Nostril route once daily.      gabapentin (NEURONTIN) 300 MG capsule Take 1 capsule (300 mg total) by mouth 2 (two) times daily. 60 capsule 1    HYDROcodone-acetaminophen (NORCO) 5-325 mg per tablet Take 1 tablet by mouth every 4 (four) hours as needed for Pain. 10 tablet 0    krill-om-3-dha-epa-phospho-ast 1,000-230-60 mg Cap MegaRed Omega-3 Krill Oil 1,000 mg-230 mg-60 mg capsule   Take 1 capsule by oral route.      lisinopriL (PRINIVIL,ZESTRIL) 2.5 MG tablet Take 2.5 mg by mouth once daily.      ondansetron (ZOFRAN) 8 MG tablet Take 1 tablet (8 mg total) by mouth every 8 (eight) hours as needed for Nausea. 20 tablet 1    ondansetron (ZOFRAN-ODT) 8 MG TbDL Dissolve 1 tablet (8 mg total) by mouth every 8 (eight) hours as needed (nausea/vomiting). 60 tablet 5    pantoprazole (PROTONIX) 40 MG tablet Take 1 tablet (40 mg total) by mouth once daily. 90 tablet 3    senna-docusate 8.6-50 mg (PERICOLACE) 8.6-50 mg per tablet Take 1 tablet by mouth 2 (two) times daily.      sildenafiL (VIAGRA) 50 MG tablet Take 50 mg by mouth daily as needed.      tenofovir alafenamide (VEMLIDY) 25 mg Tab Take 1 tablet (25 mg total) by mouth once daily. 30 tablet 11    dexAMETHasone (DECADRON) 4 MG Tab Take 2 tablets by mouth once daily on Days 2 & 3 of chemotherapy 20 tablet 1     No current facility-administered medications for this visit.                  REVIEW OF SYSTEMS:     Pulmonary related symptoms as per HPI.  Gen:  no weight loss, no fever, no night  sweat  HEENT:  no visual changes, no sore throat, no hearing loss  CV:  per hpi  GI:  no melena, no hematochezia, no diarhea, no constipation.  :  no dysuria, no hematuria, no hesistancy, no dribbling  Neuro:  no syncope, no vertigo, no tinitus  Psych:  No homocide or suicide ideation; no depression.  Endocrine:  No heat or cold intolerance.  Sleep:  + snoring; no witnessed apnea.  Rested upon awake.    Otherwise, a balance of systems reviewed is negative.          PHYSICAL EXAM:  Vitals:    04/17/24 0859   BP: 130/78   Pulse: 71   SpO2: 95%   Weight: 98.5 kg (217 lb 4.2 oz)   Height: 6' (1.829 m)   PainSc:   4   PainLoc: Shoulder     Body mass index is 29.47 kg/m².     GENERAL:  well develop; no apparent distress  HEENT:  + nasal congestion; no discharge noted; class 3 modified mallampatti.   NECK:  supple; no palpable masses.  CARDIO: regular rate and rhythm  PULM:  clear to auscultation bilaterally; no intercostals retractions; no accessory muscle usage   ABDOMEN:  soft nontender/nondistended.  +bowel sound  EXTREMITIES no cce  NEURO:  CN II-XII intact.  5/5 motor in all extremities.  sensation grossly intact   to light touch.  PSYCH:  normal affect.  Alert and oriented x 4    LABS  Pulmonary Functions Testing Results(personally reviewed):  none  ABG (personally reviewed):  none  CXR (personally reviewed):  5/2/23 no consolidation or effusion.    CT CHEST(personally reviewed):  11/29/23 when compared to prior ct chest on 5/1/23 clusters of micronodule tigre (2:54), rul (2:50) and rll (2:71) has not changed.  Mild emphysematous changes.      ASSESSMENT/PLAN  Problem List Items Addressed This Visit       Immunodeficiency secondary to chemotherapy - Primary    Current Assessment & Plan     On chronic decadron per oncology         Nasal congestion    Overview     sinus irrigation and nasal steroid         Pulmonary nodule    Overview     Cluster of micronodules noted in tigre, rul and rll that was first seen on 5/1/23.   Repeat ct 11/29/23 without changes.  Size and stability c/w benign etiology  Clinically asymptomatic  Patient with repeat ct chest scheduled on 5/6/24.  Await result.            Other Visit Diagnoses       Pulmonary nodules                  Patient will No follow-ups on file.     CC: Send copy of this note to Amparo Michaels MD     45 minutes of total time spent on the encounter, which includes face to face time and non-face to face time preparing to see the patient (eg, review of tests), Obtaining and/or reviewing separately obtained history, documenting clinical information in the electronic or other health record, independently interpreting results (not separately reported) and communicating results to the patient/family/caregiver, or Care coordination (not separately reported).

## 2024-05-14 ENCOUNTER — OFFICE VISIT (OUTPATIENT)
Dept: HEMATOLOGY/ONCOLOGY | Facility: CLINIC | Age: 69
End: 2024-05-14
Payer: MEDICARE

## 2024-05-14 VITALS
WEIGHT: 215.38 LBS | HEART RATE: 76 BPM | DIASTOLIC BLOOD PRESSURE: 79 MMHG | HEIGHT: 72 IN | SYSTOLIC BLOOD PRESSURE: 142 MMHG | BODY MASS INDEX: 29.17 KG/M2 | OXYGEN SATURATION: 97 %

## 2024-05-14 DIAGNOSIS — R91.8 PULMONARY NODULES: ICD-10-CM

## 2024-05-14 DIAGNOSIS — G62.0 CHEMOTHERAPY-INDUCED NEUROPATHY: ICD-10-CM

## 2024-05-14 DIAGNOSIS — R76.8 HEPATITIS B CORE ANTIBODY POSITIVE: ICD-10-CM

## 2024-05-14 DIAGNOSIS — E04.1 THYROID NODULE: ICD-10-CM

## 2024-05-14 DIAGNOSIS — C17.9 ADENOCARCINOMA OF SMALL BOWEL: Primary | ICD-10-CM

## 2024-05-14 DIAGNOSIS — T45.1X5A CHEMOTHERAPY-INDUCED NEUROPATHY: ICD-10-CM

## 2024-05-14 PROCEDURE — 99999 PR PBB SHADOW E&M-EST. PATIENT-LVL IV: CPT | Mod: PBBFAC,,, | Performed by: INTERNAL MEDICINE

## 2024-05-14 PROCEDURE — 1126F AMNT PAIN NOTED NONE PRSNT: CPT | Mod: CPTII,S$GLB,, | Performed by: INTERNAL MEDICINE

## 2024-05-14 PROCEDURE — 3288F FALL RISK ASSESSMENT DOCD: CPT | Mod: CPTII,S$GLB,, | Performed by: INTERNAL MEDICINE

## 2024-05-14 PROCEDURE — 3077F SYST BP >= 140 MM HG: CPT | Mod: CPTII,S$GLB,, | Performed by: INTERNAL MEDICINE

## 2024-05-14 PROCEDURE — 1101F PT FALLS ASSESS-DOCD LE1/YR: CPT | Mod: CPTII,S$GLB,, | Performed by: INTERNAL MEDICINE

## 2024-05-14 PROCEDURE — 3008F BODY MASS INDEX DOCD: CPT | Mod: CPTII,S$GLB,, | Performed by: INTERNAL MEDICINE

## 2024-05-14 PROCEDURE — 1159F MED LIST DOCD IN RCRD: CPT | Mod: CPTII,S$GLB,, | Performed by: INTERNAL MEDICINE

## 2024-05-14 PROCEDURE — 3078F DIAST BP <80 MM HG: CPT | Mod: CPTII,S$GLB,, | Performed by: INTERNAL MEDICINE

## 2024-05-14 PROCEDURE — 4010F ACE/ARB THERAPY RXD/TAKEN: CPT | Mod: CPTII,S$GLB,, | Performed by: INTERNAL MEDICINE

## 2024-05-14 PROCEDURE — 99214 OFFICE O/P EST MOD 30 MIN: CPT | Mod: S$GLB,,, | Performed by: INTERNAL MEDICINE

## 2024-05-14 RX ORDER — GABAPENTIN 300 MG/1
300 CAPSULE ORAL 2 TIMES DAILY
Qty: 60 CAPSULE | Refills: 3 | Status: SHIPPED | OUTPATIENT
Start: 2024-05-14 | End: 2024-09-11

## 2024-05-14 NOTE — PROGRESS NOTES
Subjective     Patient ID: Poli Young is a 68 y.o. male.    Chief Complaint: No chief complaint on file.  Diagnosis: Small bowel adenocarcinoma .Stage IIIB pT3 pN2cM0  Treatment :status post small bowel resection on 4/4/23                Adjuvant mFOLFOX 5/16/23- 11/22/23  HPI  67 y/o male here for follow up for small bowel adenocarcinoma. Heunderwent work up for anemia and was found to have a small bowel mass about 100cm from IC valve by enteroscopy by Dr Cruz. Got one blood transfusion. He reports history of left central intermittent abdominal pain for several months. No NV. Was found to be anemic on bloodwork. EGD in June 2022 did not show any obvious cause of bleeding.  Capsule endoscopy in January showed blood in small bowel. Enteroscopy showed mass in March that was biopsied and tattooed. Biopsy positive for adenocarcinoma. He is status post small bowel resection on 4/4/23. Pathology positive for  Invasive adenocarcinoma with partial mucinous component (40% of tumor) Lymphovascular invasion is present 17 lymph nodes, 3 positive for metastatic carcinoma (3/17) .Resection margins free of tumor  Pathologic stage pT3 pN Category:  pN2 . Today , he is doing well. Denies dizziness, weakness. No CP, SOB.Appetite improving postop.CT a/p w/contrast 4/13/23 shows Postoperative changes from partial small-bowel resection for a small bowel tumor . No evidence of distant metastases.      Interval Hx: Doing well  No new issues   No SOB/CP  No melena, hematochezia or change in bowel habits  Cont with intermittent tingling/numbness in extremities feet> hands -improved   Requests Rx Refill for gabapentin  Workup for abnormal transaminases shows positive Hep B core ab   Taking Vemlidy     Sochx: Retired . Never smoked. Drinks etoh occasionally.   .       Past Medical History:   Diagnosis Date    Hyperlipemia     Hypertension        Past Surgical History:   Procedure Laterality Date    DIAGNOSTIC  LAPAROSCOPY N/A 4/4/2023    Procedure: LAPAROSCOPY, DIAGNOSTIC;  Surgeon: Tyrone Martínez MD;  Location: [facility];  Service: General;  Laterality: N/A;    ESOPHAGOGASTRODUODENOSCOPY N/A 6/8/2022    Procedure: EGD (ESOPHAGOGASTRODUODENOSCOPY);  Surgeon: Aamir Rushing MD;  Location: [facility] END[facility];  Service: Endoscopy;  Laterality: N/A;    EXCISION, SMALL INTESTINE N/A 4/4/2023    Procedure: EXCISION, SMALL INTESTINE/SMALL BOWEL RESECTION;  Surgeon: Tyrone Martínez MD;  Location: [facility];  Service: General;  Laterality: N/A;    INSERTION OF VENOUS ACCESS PORT Right 5/2/2023    Procedure: INSERTION, VENOUS ACCESS PORT;  Surgeon: Tyrone Martínez MD;  Location: [facility] OR;  Service: General;  Laterality: Right;    INTRALUMINAL GASTROINTESTINAL TRACT IMAGING VIA CAPSULE N/A 1/20/2023    Procedure: IMAGING PROCEDURE, GI TRACT, INTRALUMINAL, VIA CAPSULE;  Surgeon: Aamir Rushing MD;  Location: [facility];  Service: Endoscopy;  Laterality: N/A;    LAPAROTOMY, EXPLORATORY N/A 4/4/2023    Procedure: LAPAROTOMY, EXPLORATORY;  Surgeon: Tyrone Martínez MD;  Location: [facility] OR;  Service: General;  Laterality: N/A;    SMALL BOWEL ENTEROSCOPY N/A 3/20/2023    Procedure: ENTEROSCOPY Upper DBE;  Surgeon: Giancarlo Cruz MD;  Location: [facility];  Service: Endoscopy;  Laterality: N/A;        Social History     Tobacco Use    Smoking status: Never    Smokeless tobacco: Never   Substance Use Topics    Alcohol use: Yes     Comment: daily use for years, stopped    Drug use: Never        Review of Systems   Constitutional:  Negative for appetite change, fatigue, fever and unexpected weight change.   HENT:  Negative for mouth sores.    Eyes:  Negative for visual disturbance.   Respiratory:  Negative for cough and shortness of breath.    Cardiovascular:  Negative for chest pain.   Gastrointestinal:  Negative for abdominal pain and diarrhea.   Genitourinary:  Negative for frequency.   Musculoskeletal:  Negative for back pain.    Integumentary:  Negative for rash.   Neurological:  Positive for numbness. Negative for headaches.   Hematological:  Negative for adenopathy.   Psychiatric/Behavioral:  The patient is not nervous/anxious.           Objective       Vitals:    05/14/24 1036   BP: (!) 142/79   Pulse: 76   SpO2: 97%   Weight: 97.7 kg (215 lb 6.2 oz)   Height: 6' (1.829 m)       Physical Exam  Constitutional:       Appearance: He is well-developed.   HENT:      Head: Normocephalic.      Mouth/Throat:      Pharynx: No oropharyngeal exudate.   Eyes:      General: No scleral icterus.        Right eye: No discharge.         Left eye: No discharge.      Conjunctiva/sclera: Conjunctivae normal.   Neck:      Thyroid: No thyromegaly.   Cardiovascular:      Rate and Rhythm: Normal rate and regular rhythm.      Heart sounds: Normal heart sounds. No murmur heard.  Pulmonary:      Effort: Pulmonary effort is normal.      Breath sounds: Normal breath sounds. No wheezing or rales.   Abdominal:      General: Bowel sounds are normal.      Palpations: Abdomen is soft.      Tenderness: There is no abdominal tenderness. There is no guarding or rebound.   Musculoskeletal:         General: No swelling. Normal range of motion.      Cervical back: Normal range of motion and neck supple.   Lymphadenopathy:      Cervical: No cervical adenopathy.      Upper Body:      Right upper body: No supraclavicular adenopathy.      Left upper body: No supraclavicular adenopathy.   Skin:     Findings: No erythema or rash.   Neurological:      Mental Status: He is alert and oriented to person, place, and time.      Cranial Nerves: No cranial nerve deficit.   Psychiatric:         Mood and Affect: Mood normal.         Behavior: Behavior normal.              Pathology 4/4/23  Small intestine, segmental resection:   - Invasive adenocarcinoma with partial mucinous component (40% of tumor)       - Tumor invades through muscularis propria into subserosa   - Lymphovascular invasion  is present   - 17 lymph nodes, 3 positive for metastatic carcinoma (3/17)   - Resection margins free of tumor   - Background small intestinal mucosa without significant histopathologic alteration   - See CAP Tumor synoptic and comment     CAP Tumor Synoptic:   Procedure:  Segmental resection   Tumor site:  Small intestine, not otherwise specified   Histologic type:  Adenocarcinoma (not otherwise characterized), with mucinous component comprising approximately 40% of tumor   Histologic grade:  G2, moderately differentiated   Tumor size:  5.5 cm in greatest dimension   Tumor extent:  Invades through muscularis propria into subserosa without serosal penetration   Lymphovascular invasion:  Present   Macroscopic tumor perforation:  Not identified   Margin status for invasive carcinoma:  All margins negative for invasive carcinoma   Margin status for dysplasia:  All margins negative for carcinoma in situ (high-grade dysplasia)/ adenoma   Regional lymph node status:  Tumor present in regional lymph nodes   Number of lymph nodes with tumor:  3   Number of lymph nodes examined:  17   PATHOLOGIC STAGE CLASSIFICATION   TNM descriptors:  Not applicable   pT Category:  pT3   pN Category:  pN2   Special studies:  Intact expression of DNA mismatch repair proteins (MLH1, MSH2, MSH6, PMS2) in tumor by immunohistochemistry (performed on prior biopsy KES-, collected 3/20/2023); interpreted by Dr. Garcias)   Comment:  Records from this patient's reported recent colonoscopy are not available for review.       CT a/p w/contrast 4/13/23  Impression:     Postoperative changes from partial small-bowel resection for a small bowel tumor.  Pathology results are pending.     Skin thickening and subcutaneous edema near the umbilicus, which could be inflammatory or infectious.  No organized fluid collections along the surgical incision.     Dilated small bowel loops with gradual tapering distally, likely ileus in the early postoperative  setting.     Small volume ascites and trace pneumoperitoneum, likely postoperative.  Anastomotic leak is less likely.  No organized intraperitoneal collections.     Other findings as described.        CT chest w/out contrast  5/1/23   Impression:     Asymmetric thyroid gland with possible left lobe thyroid nodule measuring up to 2.2 cm.  Further evaluation with thyroid ultrasound examination is recommended.     Mild centrilobular emphysematous changes.     Mucous plugging within the left upper lobe laterally and within the right lower lobe laterally.     Cluster of pulmonary nodules within the upper left upper lobe measuring up to 0.6 cm in size.  These are likely infectious/inflammatory in etiology, however continued surveillance is recommended in this patient with history of malignancy.     Hepatic cysts.         CT c/a/p 7/3/2023   Impression:     1. Postsurgical changes compatible with partial small bowel resection with enteroenteric anastomosis and surgical sutures in the region left upper quadrant, not significantly changed.  2. Near complete interval resolution of previously noted cluster of pulmonary nodules in the left upper lobe.  Cluster of approximately 3 ground-glass pulmonary micro nodules in the left upper lobe and, second cluster of approximately 3 ground-glass pulmonary micro nodules in the right lower lobe, not significantly changed.  A few borderline enlarged mediastinal lymph nodes, not significantly changed.  3. Multiple simple appearing hepatic parenchymal cyst and multiple additional subcentimeter hypodensities that are too small to accurately characterize, not significantly changed.  4. 0.4-cm parenchymal hypodensity in the pancreatic head, not significantly changed.  5. Simple appearing renal cortical cystic lesion in the lateral aspect of the right midpole, not significantly changed. Multiple additional subcentimeter renal cortical hypodensities bilaterally are too small to accurately  characterize but not significantly changed when compared to prior.        Lab Results   Component Value Date    WBC 3.34 (L) 05/13/2024    HGB 14.2 05/13/2024    HCT 42.1 05/13/2024    MCV 91 05/13/2024     05/13/2024       Hep B Core Total Ab Non-reactive Reactive Abnormal          Hepatitis B Surface Ag Non-reactive Non-reactive    Hep B C IgM Non-reactive Non-reactive    Hep A IgM Non-reactive Non-reactive    Hepatitis C Ab Non-reactive Non-reactive        Component      Latest Ref Rng 7/7/2023 7/24/2023 9/8/2023 11/7/2023   CEA      0.0 - 5.0 ng/mL 2.4  2.4  3.0  3.8       Latest Reference Range & Units 12/08/23 09:51 02/08/24 09:34   CEA 0.0 - 5.0 ng/mL 3.2 2.7       CT c/a/p w/ IV contrast 11/29/23  Impression:     1. Postsurgical changes compatible with partial small bowel resection with enteroenteric anastomosis and surgical sutures in the region left upper quadrant, not significantly changed.  2. Cluster of micro nodules in the bilateral upper lobes and right lower lobe and, a few borderline enlarged mediastinal lymph nodes, not significantly changed.  3. Multiple simple appearing hepatic parenchymal cyst and multiple additional subcentimeter hypodensities that are too small to accurately characterize, not significantly changed.  4. 0.4-cm parenchymal hypodensity in the pancreatic head, not significantly changed.  5. Simple appearing right midpole renal cortical cystic lesion urine, multiple additional subcentimeter renal cortical hypodensities bilaterally are too small to accurately characterize but not significantly changed when compared to p        CT c/a/p w/contrast 5/6/24  Impression:     In patient with history small bowel adenocarcinoma, there are postoperative changes of prior partial small bowel resection.  No evidence of new or worsening metastatic disease in the chest, abdomen or pelvis.     Additional findings in the body of the report.     There are no measurable lesions per RECIST  "criteria.      Assessment and Plan     Problem List Items Addressed This Visit           Diagnoses and all orders for this visit:    Adenocarcinoma of small bowel  Poli Young is a 67 y.o. male with biopsy proven small bowel adenocarcinoma  status post small bowel resection on 4/4/23.pT3 pN2M0  CT a/p no evidence of distant mets  S/p C12 adjuvant chemo FOLFOX completed 11/22/23  Follow up CT imaging 5/6/24- INES recurrence   ctDNA -signatera NEG 3/2024   Labs reviewed  Follow up in 3mos with labs      -- There are no guidelines for post-treatment surveillance for small bowel adenocarcinoma from the American Society of Clinical Oncology (ASCO) or the European Society of Medical Oncology (ESMO). Guidelines from the NCCN generally follow a similar approach as for resected colon cancer, but with some exceptions [5] (see "  ?History and physical examination every 3 to 6 months for two years, then every 6 months for a total of five years  ?Chest/abdomen/pelvic CT every 6 to 12 months for two years, then annually for a total of five years  ?Assay of tumor markers CEA and/or CA 19-9-every 3 to 6 months for two years, then every 6 months for a total of five years  ?Routine capsule endoscopy is not indicated          Hep B core antibody positive   F/b  Hepatology  Cont Vemlidy until may 2025     Thyroid nodule  US thyroid complete and nodule bx with benign results    Pulmonary nodules  CT imaging 5/2024 -Stable 5 mm left upper lobe pulmonary nodule     Chemotherapy-induced neuropathy  improved  Rx provided for gabapentin 300mg po bid         Cbc,cmp, CEA , Fe studies  prior to f/u 3MO          I spent a total of  40  minutes on the day of the visit.This includes face to face time and non-face to face time preparing to see the patient (eg, review of tests), obtaining and/or reviewing separately obtained history, documenting clinical information in the electronic or other health record, independently interpreting results and " communicating results to the patient/family/caregiver, and coordinating care.            Amparo Michaels MD  [facility]

## 2024-06-18 DIAGNOSIS — C17.9 ADENOCARCINOMA OF SMALL BOWEL: ICD-10-CM

## 2024-06-18 RX ORDER — ONDANSETRON 8 MG/1
TABLET, ORALLY DISINTEGRATING ORAL
Qty: 60 TABLET | Refills: 5 | Status: SHIPPED | OUTPATIENT
Start: 2024-06-18

## 2024-07-05 ENCOUNTER — OFFICE VISIT (OUTPATIENT)
Dept: ENDOCRINOLOGY | Facility: CLINIC | Age: 69
End: 2024-07-05
Payer: MEDICARE

## 2024-07-05 VITALS
BODY MASS INDEX: 29.48 KG/M2 | SYSTOLIC BLOOD PRESSURE: 134 MMHG | RESPIRATION RATE: 18 BRPM | HEART RATE: 74 BPM | WEIGHT: 217.63 LBS | HEIGHT: 72 IN | OXYGEN SATURATION: 97 % | DIASTOLIC BLOOD PRESSURE: 72 MMHG

## 2024-07-05 DIAGNOSIS — E04.1 THYROID NODULE: Primary | ICD-10-CM

## 2024-07-05 DIAGNOSIS — B96.81 CHRONIC HELICOBACTER PYLORI GASTRITIS: ICD-10-CM

## 2024-07-05 DIAGNOSIS — C17.9 ADENOCARCINOMA OF SMALL BOWEL: ICD-10-CM

## 2024-07-05 DIAGNOSIS — K29.50 CHRONIC HELICOBACTER PYLORI GASTRITIS: ICD-10-CM

## 2024-07-05 PROCEDURE — 99999 PR PBB SHADOW E&M-EST. PATIENT-LVL IV: CPT | Mod: PBBFAC,,, | Performed by: HOSPITALIST

## 2024-07-05 RX ORDER — PANTOPRAZOLE SODIUM 40 MG/1
40 TABLET, DELAYED RELEASE ORAL DAILY
Qty: 90 TABLET | Refills: 3 | Status: SHIPPED | OUTPATIENT
Start: 2024-07-05

## 2024-07-05 NOTE — TELEPHONE ENCOUNTER
Patient requesting refill on his pantoprazole. Medication was last filled on 7/3/23. Patient was last seen on 5/14/24 and will follow up on 9/16/24.

## 2024-07-05 NOTE — PROGRESS NOTES
Subjective:      Patient ID: Poli Young is a 68 y.o. male presented to [facility] on 7/5/2024.  Chief Complaint:  Follow-up    History of Present Illness: Poli Young is a 68 y.o. male here for thyroid nodule  Other significant past medical history: Small intestine cancer      Interval history: Last seen by me 06/2023 for left 2.7 cm thyroid nodule.  FNA last year benign.  Here for follow-up.  Denies any worsening,  compressive symptoms  No dysphagia  No issue with swallowing   Thyroid ultrasound done for 6/2024: Stable in size 2.8 cm left nodule now      1) Thyroid nodule   - Diagnosed by ultrasound in:  6/6/2023:  solid, part cystic nodule measuring up to 2.7 cm.  - Patient CT chest showing thyroid nodule on 05/2023  - Patient underwent FNA of large thyroid nodule 6/15/2023: Benign thyroid nodule  - Family history thyroid disorder: no  - Family history thyroid cancer: no  - Tobacco user: no    Symptoms:  No  Yes  [x]    [] Compressive symptoms  [x]    [] Anterior neck pressure  [x]    [] Dysphagia sometimes  [x]    [] Voice changes - comes and goes     Risk Factors:  No   Yes  [x]    [] Radiation to head or neck for any treatment of cancer or exposure to radiation  [x]    [] Personal history of cancer including:  colon or breast cancer    Symptoms of hyper or hypothyroidism:  Weight changes?: No  Diarrhea or Constipation?: No  Heat or cold intolerance?: No  Hair, nail or skin changes?: No  Chest pain, palpations or shortness of breath?: No   Mental fog?: No    Last ultrasound:  5/01/2024  Right thyroid lobe measures 4.6 x 1.3 x 1.7 cm.  Left lobe measures 4.5 x 1.5 x 1.8 cm.  Isthmus measures 0.3 cm.  There is a 2.8 x 2 x 1.6 cm mostly solid, isoechoic, wider than tall left lobe nodule with smooth borders and no internal echogenic foci, similar when compared to the previous ultrasound.  No new nodules.  Thyroid parenchymal vascularity appears within normal limits.  1.4 cm  "hypoechoic left cervical lymph nodes, possibly reactive.     Impression:  Stable left thyroid nodule.  Lesion was previously biopsied with benign pathology results.     6/6/2023  Right thyroid lobe measures 4.7 x 1.4 x 1.7 cm with homogeneous parenchyma.  Left thyroid lobe measures 4.4 x 2.3 x 1.9 cm.  Heterogeneous mostly solid, part cystic nodule measuring up to 2.7 cm.  Isthmus measures 0.3 cm.  No evidence for cervical adenopathy.     Impression:  Single measured nodule in the left lobe which meets criteria for FNA guided sampling.    Lab work reviewed  Lab Results   Component Value Date    TSH 0.713 06/26/2023    FREET4 1.02 06/26/2023      Antibodies  No results found for: "THYROIDAB", "TSIMMGLBN", "THYROIDSTIMI", "THYROTROPINR"    Reviewed past surgical, medical, family, social history and updated as appropriate.  Review of Systems: see HPI above  Objective:   /72   Pulse 74   Resp 18   Ht 6' (1.829 m)   Wt 98.7 kg (217 lb 9.6 oz)   SpO2 97%   BMI 29.51 kg/m²   Body mass index is 29.51 kg/m².  Vital signs reviewed    Physical Exam  Vitals and nursing note reviewed.   Constitutional:       General: He is not in acute distress.     Appearance: Normal appearance. He is well-developed. He is not toxic-appearing.   Neck:      Thyroid: No thyromegaly.      Comments: No thyroid enlargement noted  Cardiovascular:      Heart sounds: Normal heart sounds.   Pulmonary:      Effort: Pulmonary effort is normal. No respiratory distress.   Abdominal:      Tenderness: There is no abdominal tenderness.   Musculoskeletal:         General: No deformity. Normal range of motion.      Cervical back: Normal range of motion.   Skin:     Findings: No bruising.   Neurological:      Mental Status: He is alert and oriented to person, place, and time.   Psychiatric:         Mood and Affect: Mood normal.     Lab Reviewed:  See results in subjective  No results found for: "HGBA1C"  No results found for: "CHOL", "HDL", "LDLCALC", " ""TRIG", "CHOLHDL"  Lab Results   Component Value Date     05/13/2024    K 4.3 05/13/2024     05/13/2024    CO2 22 (L) 05/13/2024     (H) 05/13/2024    BUN 23 05/13/2024    CREATININE 1.3 05/13/2024    CALCIUM 9.6 05/13/2024    PHOS 3.7 04/11/2023    PROT 7.9 05/13/2024    ALBUMIN 4.0 05/13/2024    BILITOT 0.5 05/13/2024    ALKPHOS 114 05/13/2024    AST 21 05/13/2024    ALT 19 05/13/2024    ANIONGAP 9 05/13/2024    EGFRNORACEVR 59.8 (A) 05/13/2024    TSH 0.713 06/26/2023     Assessment     1. Thyroid nodule  Ambulatory referral/consult to Endocrinology    US Soft Tissue Head Neck    TSH    T4, Free      2. Adenocarcinoma of small bowel           Plan     Thyroid nodule  - 06/2023 for left 2.7 cm thyroid nodule.  - Patient underwent FNA of large thyroid nodule 6/15/2023: Benign thyroid nodule  - Denies compressive symptoms, No dysphagia, No issue with swallowing   - Thyroid ultrasound done for 6/2024: Stable in size 2.8 cm left nodule now  - check TSH and free T4 2025 monitoring of thyroid function  - given negative FNA, stable ultrasound 2024, advise repeat thyroid ultrasound in 2025 for evaluation and monitoring  - routine follow-up with PCP    Adenocarcinoma of small bowel  - continue follow up with Hematology Oncology    Thyroid nodule      Advised patient to follow up with PCP for routine health maintenance care.   RTC in yearly with ultrasound prior to visit    Edgardo Villatoro M.D.  Endocrinology  [facility]  7/5/2024      Disclaimer: This note has been generated in part with the use of voice-recognition software. There may be typographical errors that have been missed during proof-reading.    "

## 2024-07-23 NOTE — Clinical Note
CBC mon am prior to chemo Proceed with chemo on Mon ( pending labs)  CT in 3-4 weeks Cbc,cmp, CEA  prior to f/u mid Dec   <-- Click to add NO pertinent Past Medical History

## 2024-09-16 ENCOUNTER — OFFICE VISIT (OUTPATIENT)
Dept: HEMATOLOGY/ONCOLOGY | Facility: CLINIC | Age: 69
End: 2024-09-16
Payer: MEDICARE

## 2024-09-16 VITALS
HEIGHT: 72 IN | BODY MASS INDEX: 29.35 KG/M2 | WEIGHT: 216.69 LBS | DIASTOLIC BLOOD PRESSURE: 79 MMHG | HEART RATE: 82 BPM | OXYGEN SATURATION: 96 % | SYSTOLIC BLOOD PRESSURE: 126 MMHG

## 2024-09-16 DIAGNOSIS — G62.0 CHEMOTHERAPY-INDUCED NEUROPATHY: ICD-10-CM

## 2024-09-16 DIAGNOSIS — E04.1 THYROID NODULE: ICD-10-CM

## 2024-09-16 DIAGNOSIS — R91.8 PULMONARY NODULES: ICD-10-CM

## 2024-09-16 DIAGNOSIS — C17.9 ADENOCARCINOMA OF SMALL BOWEL: Primary | ICD-10-CM

## 2024-09-16 DIAGNOSIS — R76.8 HEPATITIS B CORE ANTIBODY POSITIVE: ICD-10-CM

## 2024-09-16 DIAGNOSIS — T45.1X5A CHEMOTHERAPY-INDUCED NEUROPATHY: ICD-10-CM

## 2024-09-16 PROCEDURE — 99214 OFFICE O/P EST MOD 30 MIN: CPT | Mod: S$GLB,,, | Performed by: INTERNAL MEDICINE

## 2024-09-16 PROCEDURE — 1159F MED LIST DOCD IN RCRD: CPT | Mod: CPTII,S$GLB,, | Performed by: INTERNAL MEDICINE

## 2024-09-16 PROCEDURE — 1101F PT FALLS ASSESS-DOCD LE1/YR: CPT | Mod: CPTII,S$GLB,, | Performed by: INTERNAL MEDICINE

## 2024-09-16 PROCEDURE — 4010F ACE/ARB THERAPY RXD/TAKEN: CPT | Mod: CPTII,S$GLB,, | Performed by: INTERNAL MEDICINE

## 2024-09-16 PROCEDURE — 3074F SYST BP LT 130 MM HG: CPT | Mod: CPTII,S$GLB,, | Performed by: INTERNAL MEDICINE

## 2024-09-16 PROCEDURE — 3008F BODY MASS INDEX DOCD: CPT | Mod: CPTII,S$GLB,, | Performed by: INTERNAL MEDICINE

## 2024-09-16 PROCEDURE — 3078F DIAST BP <80 MM HG: CPT | Mod: CPTII,S$GLB,, | Performed by: INTERNAL MEDICINE

## 2024-09-16 PROCEDURE — 1126F AMNT PAIN NOTED NONE PRSNT: CPT | Mod: CPTII,S$GLB,, | Performed by: INTERNAL MEDICINE

## 2024-09-16 PROCEDURE — 99999 PR PBB SHADOW E&M-EST. PATIENT-LVL IV: CPT | Mod: PBBFAC,,, | Performed by: INTERNAL MEDICINE

## 2024-09-16 PROCEDURE — 3288F FALL RISK ASSESSMENT DOCD: CPT | Mod: CPTII,S$GLB,, | Performed by: INTERNAL MEDICINE

## 2024-09-16 NOTE — PROGRESS NOTES
Subjective     Patient ID: Poli Young is a 68 y.o. male.    Chief Complaint: Follow-up (Adenocarcinoma of small bowel ) and adenocinoma   Diagnosis: Small bowel adenocarcinoma .Stage IIIB pT3 pN2cM0  Treatment :status post small bowel resection on 4/4/23                Adjuvant mFOLFOX 5/16/23- 11/22/23  HPI  69 y/o male here for follow up for small bowel adenocarcinoma. Heunderwent work up for anemia and was found to have a small bowel mass about 100cm from IC valve by enteroscopy by Dr Cruz. Got one blood transfusion. He reports history of left central intermittent abdominal pain for several months. No NV. Was found to be anemic on bloodwork. EGD in June 2022 did not show any obvious cause of bleeding.  Capsule endoscopy in January showed blood in small bowel. Enteroscopy showed mass in March that was biopsied and tattooed. Biopsy positive for adenocarcinoma. He is status post small bowel resection on 4/4/23. Pathology positive for  Invasive adenocarcinoma with partial mucinous component (40% of tumor) Lymphovascular invasion is present 17 lymph nodes, 3 positive for metastatic carcinoma (3/17) .Resection margins free of tumor  Pathologic stage pT3 pN Category:  pN2 . Today , he is doing well. Denies dizziness, weakness. No CP, SOB.Appetite improving postop.CT a/p w/contrast 4/13/23 shows Postoperative changes from partial small-bowel resection for a small bowel tumor . No evidence of distant metastases.      Interval Hx: Doing well  No new issues   No SOB/CP  No melena, hematochezia or change in bowel habits intermittent tingling/numbness in extremities feet> hands -improved   Requests Rx Refill for gabapentin  Workup for abnormal transaminases shows positive Hep B core ab   Taking Vemlidy     Sochx: Retired . Never smoked. Drinks etoh occasionally.   .       Past Medical History:   Diagnosis Date    Hyperlipemia     Hypertension        Past Surgical History:   Procedure Laterality Date     DIAGNOSTIC LAPAROSCOPY N/A 4/4/2023    Procedure: LAPAROSCOPY, DIAGNOSTIC;  Surgeon: Tyrone Martínez MD;  Location: [facility];  Service: General;  Laterality: N/A;    ESOPHAGOGASTRODUODENOSCOPY N/A 6/8/2022    Procedure: EGD (ESOPHAGOGASTRODUODENOSCOPY);  Surgeon: Aamir Rushing MD;  Location: [facility] ENDO;  Service: Endoscopy;  Laterality: N/A;    EXCISION, SMALL INTESTINE N/A 4/4/2023    Procedure: EXCISION, SMALL INTESTINE/SMALL BOWEL RESECTION;  Surgeon: Tyrone Martínez MD;  Location: [facility];  Service: General;  Laterality: N/A;    INSERTION OF VENOUS ACCESS PORT Right 5/2/2023    Procedure: INSERTION, VENOUS ACCESS PORT;  Surgeon: Tyrone Martínez MD;  Location: [facility] OR;  Service: General;  Laterality: Right;    INTRALUMINAL GASTROINTESTINAL TRACT IMAGING VIA CAPSULE N/A 1/20/2023    Procedure: IMAGING PROCEDURE, GI TRACT, INTRALUMINAL, VIA CAPSULE;  Surgeon: Aamir Rushing MD;  Location: [facility];  Service: Endoscopy;  Laterality: N/A;    LAPAROTOMY, EXPLORATORY N/A 4/4/2023    Procedure: LAPAROTOMY, EXPLORATORY;  Surgeon: Tyrone Martínez MD;  Location: [facility] OR;  Service: General;  Laterality: N/A;    SMALL BOWEL ENTEROSCOPY N/A 3/20/2023    Procedure: ENTEROSCOPY Upper DBE;  Surgeon: Giancarlo Cruz MD;  Location: [facility];  Service: Endoscopy;  Laterality: N/A;        Social History     Tobacco Use    Smoking status: Never    Smokeless tobacco: Never   Substance Use Topics    Alcohol use: Not Currently     Comment: daily use for years, stopped    Drug use: Never        Review of Systems   Constitutional:  Negative for appetite change, fatigue, fever and unexpected weight change.   HENT:  Negative for mouth sores.    Eyes:  Negative for visual disturbance.   Respiratory:  Negative for cough and shortness of breath.    Cardiovascular:  Negative for chest pain.   Gastrointestinal:  Negative for abdominal pain and diarrhea.   Genitourinary:  Negative for frequency.   Musculoskeletal:  Negative  for back pain.   Integumentary:  Negative for rash.   Neurological:  Positive for numbness. Negative for headaches.   Hematological:  Negative for adenopathy.   Psychiatric/Behavioral:  The patient is not nervous/anxious.           Objective       Vitals:    09/16/24 1021   BP: 126/79   BP Location: Right arm   Patient Position: Sitting   Pulse: 82   SpO2: 96%   Weight: 98.3 kg (216 lb 11.4 oz)   Height: 6' (1.829 m)       Physical Exam  Constitutional:       Appearance: He is well-developed.   HENT:      Head: Normocephalic.      Mouth/Throat:      Pharynx: No oropharyngeal exudate.   Eyes:      General: No scleral icterus.        Right eye: No discharge.         Left eye: No discharge.      Conjunctiva/sclera: Conjunctivae normal.   Neck:      Thyroid: No thyromegaly.   Cardiovascular:      Rate and Rhythm: Normal rate and regular rhythm.      Heart sounds: Normal heart sounds. No murmur heard.  Pulmonary:      Effort: Pulmonary effort is normal.      Breath sounds: Normal breath sounds. No wheezing or rales.   Abdominal:      General: Bowel sounds are normal.      Palpations: Abdomen is soft.      Tenderness: There is no abdominal tenderness. There is no guarding or rebound.   Musculoskeletal:         General: No swelling. Normal range of motion.      Cervical back: Normal range of motion and neck supple.   Lymphadenopathy:      Cervical: No cervical adenopathy.      Upper Body:      Right upper body: No supraclavicular adenopathy.      Left upper body: No supraclavicular adenopathy.   Skin:     Findings: No erythema or rash.   Neurological:      Mental Status: He is alert and oriented to person, place, and time.      Cranial Nerves: No cranial nerve deficit.   Psychiatric:         Mood and Affect: Mood normal.         Behavior: Behavior normal.              Pathology 4/4/23  Small intestine, segmental resection:   - Invasive adenocarcinoma with partial mucinous component (40% of tumor)       - Tumor invades  through muscularis propria into subserosa   - Lymphovascular invasion is present   - 17 lymph nodes, 3 positive for metastatic carcinoma (3/17)   - Resection margins free of tumor   - Background small intestinal mucosa without significant histopathologic alteration   - See CAP Tumor synoptic and comment     CAP Tumor Synoptic:   Procedure:  Segmental resection   Tumor site:  Small intestine, not otherwise specified   Histologic type:  Adenocarcinoma (not otherwise characterized), with mucinous component comprising approximately 40% of tumor   Histologic grade:  G2, moderately differentiated   Tumor size:  5.5 cm in greatest dimension   Tumor extent:  Invades through muscularis propria into subserosa without serosal penetration   Lymphovascular invasion:  Present   Macroscopic tumor perforation:  Not identified   Margin status for invasive carcinoma:  All margins negative for invasive carcinoma   Margin status for dysplasia:  All margins negative for carcinoma in situ (high-grade dysplasia)/ adenoma   Regional lymph node status:  Tumor present in regional lymph nodes   Number of lymph nodes with tumor:  3   Number of lymph nodes examined:  17   PATHOLOGIC STAGE CLASSIFICATION   TNM descriptors:  Not applicable   pT Category:  pT3   pN Category:  pN2   Special studies:  Intact expression of DNA mismatch repair proteins (MLH1, MSH2, MSH6, PMS2) in tumor by immunohistochemistry (performed on prior biopsy S-, collected 3/20/2023); interpreted by Dr. Garcias)   Comment:  Records from this patient's reported recent colonoscopy are not available for review.       CT a/p w/contrast 4/13/23  Impression:     Postoperative changes from partial small-bowel resection for a small bowel tumor.  Pathology results are pending.     Skin thickening and subcutaneous edema near the umbilicus, which could be inflammatory or infectious.  No organized fluid collections along the surgical incision.     Dilated small bowel loops with  gradual tapering distally, likely ileus in the early postoperative setting.     Small volume ascites and trace pneumoperitoneum, likely postoperative.  Anastomotic leak is less likely.  No organized intraperitoneal collections.     Other findings as described.        CT chest w/out contrast  5/1/23   Impression:     Asymmetric thyroid gland with possible left lobe thyroid nodule measuring up to 2.2 cm.  Further evaluation with thyroid ultrasound examination is recommended.     Mild centrilobular emphysematous changes.     Mucous plugging within the left upper lobe laterally and within the right lower lobe laterally.     Cluster of pulmonary nodules within the upper left upper lobe measuring up to 0.6 cm in size.  These are likely infectious/inflammatory in etiology, however continued surveillance is recommended in this patient with history of malignancy.     Hepatic cysts.         CT c/a/p 7/3/2023   Impression:     1. Postsurgical changes compatible with partial small bowel resection with enteroenteric anastomosis and surgical sutures in the region left upper quadrant, not significantly changed.  2. Near complete interval resolution of previously noted cluster of pulmonary nodules in the left upper lobe.  Cluster of approximately 3 ground-glass pulmonary micro nodules in the left upper lobe and, second cluster of approximately 3 ground-glass pulmonary micro nodules in the right lower lobe, not significantly changed.  A few borderline enlarged mediastinal lymph nodes, not significantly changed.  3. Multiple simple appearing hepatic parenchymal cyst and multiple additional subcentimeter hypodensities that are too small to accurately characterize, not significantly changed.  4. 0.4-cm parenchymal hypodensity in the pancreatic head, not significantly changed.  5. Simple appearing renal cortical cystic lesion in the lateral aspect of the right midpole, not significantly changed. Multiple additional subcentimeter renal  cortical hypodensities bilaterally are too small to accurately characterize but not significantly changed when compared to prior.        Lab Results   Component Value Date    WBC 3.92 09/13/2024    HGB 13.4 (L) 09/13/2024    HCT 40.2 09/13/2024    MCV 95 09/13/2024     09/13/2024       Hep B Core Total Ab Non-reactive Reactive Abnormal          Hepatitis B Surface Ag Non-reactive Non-reactive    Hep B C IgM Non-reactive Non-reactive    Hep A IgM Non-reactive Non-reactive    Hepatitis C Ab Non-reactive Non-reactive        Component      Latest Ref Rng 7/7/2023 7/24/2023 9/8/2023 11/7/2023   CEA      0.0 - 5.0 ng/mL 2.4  2.4  3.0  3.8       Latest Reference Range & Units 12/08/23 09:51 02/08/24 09:34   CEA 0.0 - 5.0 ng/mL 3.2 2.7        Latest Reference Range & Units Most Recent 09/13/24 07:57   CEA 0.0 - 5.0 ng/mL 2.2  9/13/24 07:57 2.2         CT c/a/p w/ IV contrast 11/29/23  Impression:     1. Postsurgical changes compatible with partial small bowel resection with enteroenteric anastomosis and surgical sutures in the region left upper quadrant, not significantly changed.  2. Cluster of micro nodules in the bilateral upper lobes and right lower lobe and, a few borderline enlarged mediastinal lymph nodes, not significantly changed.  3. Multiple simple appearing hepatic parenchymal cyst and multiple additional subcentimeter hypodensities that are too small to accurately characterize, not significantly changed.  4. 0.4-cm parenchymal hypodensity in the pancreatic head, not significantly changed.  5. Simple appearing right midpole renal cortical cystic lesion urine, multiple additional subcentimeter renal cortical hypodensities bilaterally are too small to accurately characterize but not significantly changed when compared to p        CT c/a/p w/contrast 5/6/24  Impression:     In patient with history small bowel adenocarcinoma, there are postoperative changes of prior partial small bowel resection.  No evidence  "of new or worsening metastatic disease in the chest, abdomen or pelvis.     Additional findings in the body of the report.     There are no measurable lesions per RECIST criteria.      Assessment and Plan     Problem List Items Addressed This Visit           Diagnoses and all orders for this visit:    Adenocarcinoma of small bowel  Poli Young is a 67 y.o. male with biopsy proven small bowel adenocarcinoma  status post small bowel resection on 4/4/23.pT3 pN2M0  CT a/p no evidence of distant mets  S/p C12 adjuvant chemo FOLFOX completed 11/22/23  Follow up CT imaging 5/6/24- INES recurrence   ctDNA -signatera NEG 9/2024 -neg  Labs reviewed  Follow up in 3mos with labs  Plan follow up CT imaging     -- There are no guidelines for post-treatment surveillance for small bowel adenocarcinoma from the American Society of Clinical Oncology (ASCO) or the European Society of Medical Oncology (ESMO). Guidelines from the NCCN generally follow a similar approach as for resected colon cancer, but with some exceptions [5] (see "  ?History and physical examination every 3 to 6 months for two years, then every 6 months for a total of five years  ?Chest/abdomen/pelvic CT every 6 to 12 months for two years, then annually for a total of five years  ?Assay of tumor markers CEA and/or CA 19-9-every 3 to 6 months for two years, then every 6 months for a total of five years  ?Routine capsule endoscopy is not indicated          Hep B core antibody positive   F/b  Hepatology  Cont Vemlidy until may 2025     Thyroid nodule  US thyroid complete and nodule bx with benign results    Pulmonary nodules  CT imaging 5/2024 -Stable 5 mm left upper lobe pulmonary nodule   Plan follow up CT imaging     Chemotherapy-induced neuropathy  improved  Rx provided for gabapentin 300mg po bid       CT prior to f/u 3mos  Cbc,cmp, CEA , Fe studies  prior to f/u 3MO              Amparo Michaels MD  Heme/Onc [facility]        "

## 2024-11-18 DIAGNOSIS — B96.81 CHRONIC HELICOBACTER PYLORI GASTRITIS: ICD-10-CM

## 2024-11-18 DIAGNOSIS — K29.50 CHRONIC HELICOBACTER PYLORI GASTRITIS: ICD-10-CM

## 2024-11-19 RX ORDER — PANTOPRAZOLE SODIUM 40 MG/1
40 TABLET, DELAYED RELEASE ORAL DAILY
Qty: 90 TABLET | Refills: 3 | Status: SHIPPED | OUTPATIENT
Start: 2024-11-19

## 2024-12-10 DIAGNOSIS — C17.9 ADENOCARCINOMA OF SMALL BOWEL: ICD-10-CM

## 2024-12-10 RX ORDER — ONDANSETRON 8 MG/1
8 TABLET, ORALLY DISINTEGRATING ORAL EVERY 12 HOURS PRN
Qty: 60 TABLET | Refills: 1 | Status: SHIPPED | OUTPATIENT
Start: 2024-12-10 | End: 2025-02-08

## 2024-12-24 ENCOUNTER — OFFICE VISIT (OUTPATIENT)
Dept: HEMATOLOGY/ONCOLOGY | Facility: CLINIC | Age: 69
End: 2024-12-24
Payer: MEDICARE

## 2024-12-24 ENCOUNTER — INFUSION (OUTPATIENT)
Dept: INFUSION THERAPY | Facility: HOSPITAL | Age: 69
End: 2024-12-24
Attending: INTERNAL MEDICINE
Payer: MEDICARE

## 2024-12-24 VITALS
SYSTOLIC BLOOD PRESSURE: 151 MMHG | DIASTOLIC BLOOD PRESSURE: 81 MMHG | TEMPERATURE: 98 F | RESPIRATION RATE: 16 BRPM | HEART RATE: 69 BPM | OXYGEN SATURATION: 95 %

## 2024-12-24 VITALS
DIASTOLIC BLOOD PRESSURE: 74 MMHG | HEART RATE: 64 BPM | WEIGHT: 214.94 LBS | RESPIRATION RATE: 16 BRPM | OXYGEN SATURATION: 95 % | SYSTOLIC BLOOD PRESSURE: 133 MMHG | BODY MASS INDEX: 29.11 KG/M2 | HEIGHT: 72 IN

## 2024-12-24 DIAGNOSIS — C17.9 ADENOCARCINOMA OF SMALL BOWEL: Primary | ICD-10-CM

## 2024-12-24 DIAGNOSIS — E04.1 THYROID NODULE: ICD-10-CM

## 2024-12-24 DIAGNOSIS — T45.1X5A CHEMOTHERAPY-INDUCED NEUROPATHY: ICD-10-CM

## 2024-12-24 DIAGNOSIS — R76.8 HEPATITIS B CORE ANTIBODY POSITIVE: ICD-10-CM

## 2024-12-24 DIAGNOSIS — R91.8 PULMONARY NODULES: ICD-10-CM

## 2024-12-24 DIAGNOSIS — G62.0 CHEMOTHERAPY-INDUCED NEUROPATHY: ICD-10-CM

## 2024-12-24 PROBLEM — C77.2 SECONDARY AND UNSPECIFIED MALIGNANT NEOPLASM OF INTRA-ABDOMINAL LYMPH NODES: Status: ACTIVE | Noted: 2024-12-24

## 2024-12-24 PROCEDURE — 3288F FALL RISK ASSESSMENT DOCD: CPT | Mod: CPTII,S$GLB,, | Performed by: INTERNAL MEDICINE

## 2024-12-24 PROCEDURE — 3075F SYST BP GE 130 - 139MM HG: CPT | Mod: CPTII,S$GLB,, | Performed by: INTERNAL MEDICINE

## 2024-12-24 PROCEDURE — 96523 IRRIG DRUG DELIVERY DEVICE: CPT

## 2024-12-24 PROCEDURE — 3078F DIAST BP <80 MM HG: CPT | Mod: CPTII,S$GLB,, | Performed by: INTERNAL MEDICINE

## 2024-12-24 PROCEDURE — 1159F MED LIST DOCD IN RCRD: CPT | Mod: CPTII,S$GLB,, | Performed by: INTERNAL MEDICINE

## 2024-12-24 PROCEDURE — G2211 COMPLEX E/M VISIT ADD ON: HCPCS | Mod: S$GLB,,, | Performed by: INTERNAL MEDICINE

## 2024-12-24 PROCEDURE — 63600175 PHARM REV CODE 636 W HCPCS: Performed by: INTERNAL MEDICINE

## 2024-12-24 PROCEDURE — 99214 OFFICE O/P EST MOD 30 MIN: CPT | Mod: S$GLB,,, | Performed by: INTERNAL MEDICINE

## 2024-12-24 PROCEDURE — 4010F ACE/ARB THERAPY RXD/TAKEN: CPT | Mod: CPTII,S$GLB,, | Performed by: INTERNAL MEDICINE

## 2024-12-24 PROCEDURE — 3008F BODY MASS INDEX DOCD: CPT | Mod: CPTII,S$GLB,, | Performed by: INTERNAL MEDICINE

## 2024-12-24 PROCEDURE — 1101F PT FALLS ASSESS-DOCD LE1/YR: CPT | Mod: CPTII,S$GLB,, | Performed by: INTERNAL MEDICINE

## 2024-12-24 PROCEDURE — 1126F AMNT PAIN NOTED NONE PRSNT: CPT | Mod: CPTII,S$GLB,, | Performed by: INTERNAL MEDICINE

## 2024-12-24 PROCEDURE — 99999 PR PBB SHADOW E&M-EST. PATIENT-LVL IV: CPT | Mod: PBBFAC,,, | Performed by: INTERNAL MEDICINE

## 2024-12-24 RX ORDER — HEPARIN 100 UNIT/ML
500 SYRINGE INTRAVENOUS
Status: CANCELLED | OUTPATIENT
Start: 2024-12-24

## 2024-12-24 RX ORDER — SODIUM CHLORIDE 0.9 % (FLUSH) 0.9 %
10 SYRINGE (ML) INJECTION
Status: CANCELLED | OUTPATIENT
Start: 2024-12-24

## 2024-12-24 RX ORDER — HEPARIN 100 UNIT/ML
500 SYRINGE INTRAVENOUS
Status: DISCONTINUED | OUTPATIENT
Start: 2024-12-24 | End: 2024-12-24 | Stop reason: HOSPADM

## 2024-12-24 RX ORDER — SODIUM CHLORIDE 0.9 % (FLUSH) 0.9 %
10 SYRINGE (ML) INJECTION
OUTPATIENT
Start: 2024-12-24

## 2024-12-24 RX ORDER — HEPARIN 100 UNIT/ML
500 SYRINGE INTRAVENOUS
OUTPATIENT
Start: 2024-12-24

## 2024-12-24 RX ORDER — SODIUM CHLORIDE 0.9 % (FLUSH) 0.9 %
10 SYRINGE (ML) INJECTION
Status: DISCONTINUED | OUTPATIENT
Start: 2024-12-24 | End: 2024-12-24 | Stop reason: HOSPADM

## 2024-12-24 RX ADMIN — HEPARIN 500 UNITS: 100 SYRINGE at 10:12

## 2024-12-24 NOTE — PROGRESS NOTES
Subjective     Patient ID: Poli Young is a 69 y.o. male.    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 Heunderwent work up for anemia and was found to have a small bowel mass about 100cm from IC valve by enteroscopy by Dr Cruz. Got one blood transfusion. He reports history of left central intermittent abdominal pain for several months. No NV. Was found to be anemic on bloodwork. EGD in June 2022 did not show any obvious cause of bleeding.  Capsule endoscopy in January showed blood in small bowel. Enteroscopy showed mass in March that was biopsied and tattooed. Biopsy positive for adenocarcinoma. He is status post small bowel resection on 4/4/23. Pathology positive for  Invasive adenocarcinoma with partial mucinous component (40% of tumor) Lymphovascular invasion is present 17 lymph nodes, 3 positive for metastatic carcinoma (3/17) .Resection margins free of tumor  Pathologic stage pT3 pN Category:  pN2 . Today , he is doing well. Denies dizziness, weakness. No CP, SOB.Appetite improving postop.CT a/p w/contrast 4/13/23 shows Postoperative changes from partial small-bowel resection for a small bowel tumor . No evidence of distant metastases.      Interval Hx: Doing well  No new issues   No melena, hematochezia or change in bowel habits intermittent tingling/numbness in extremities feet> hands -improved   Workup for abnormal transaminases shows positive Hep B core ab   Taking Vemlidy     Sochx: Retired . Never smoked. Drinks etoh occasionally.   .       Past Medical History:   Diagnosis Date    Hyperlipemia     Hypertension        Past Surgical History:   Procedure Laterality Date    DIAGNOSTIC LAPAROSCOPY N/A 4/4/2023    Procedure: LAPAROSCOPY, DIAGNOSTIC;  Surgeon: Tyrone MCDANIEL  MD Tanya;  Location: [facility] OR;  Service: General;  Laterality: N/A;    ESOPHAGOGASTRODUODENOSCOPY N/A 6/8/2022    Procedure: EGD (ESOPHAGOGASTRODUODENOSCOPY);  Surgeon: Aamir Rushing MD;  [facility] ENDO;  Service: Endoscopy;  Laterality: N/A;    EXCISION, SMALL INTESTINE N/A 4/4/2023    Procedure: EXCISION, SMALL INTESTINE/SMALL BOWEL RESECTION;  Surgeon: Tyrone Martínez MD;  Location: [facility] OR;  Service: General;  Laterality: N/A;    INSERTION OF VENOUS ACCESS PORT Right 5/2/2023    Procedure: INSERTION, VENOUS ACCESS PORT;  Surgeon: Tyrone Martínez MD;  Location: [facility] OR;  Service: General;  Laterality: Right;    INTRALUMINAL GASTROINTESTINAL TRACT IMAGING VIA CAPSULE N/A 1/20/2023    Procedure: IMAGING PROCEDURE, GI TRACT, INTRALUMINAL, VIA CAPSULE;  Surgeon: Aamir Rushing MD;  Location: [facility] ENDO;  Service: Endoscopy;  Laterality: N/A;    LAPAROTOMY, EXPLORATORY N/A 4/4/2023    Procedure: LAPAROTOMY, EXPLORATORY;  Surgeon: Tyrone Martínez MD;  Location: [facility] OR;  Service: General;  Laterality: N/A;    SMALL BOWEL ENTEROSCOPY N/A 3/20/2023    Procedure: ENTEROSCOPY Upper DBE;  Surgeon: Giancarlo Cruz MD;  Location: [facility];  Service: Endoscopy;  Laterality: N/A;        Social History     Tobacco Use    Smoking status: Never    Smokeless tobacco: Never   Substance Use Topics    Alcohol use: Not Currently     Comment: daily use for years, stopped    Drug use: Never        Review of Systems   Constitutional:  Negative for appetite change, fatigue, fever and unexpected weight change.   HENT:  Negative for mouth sores.    Eyes:  Negative for visual disturbance.   Respiratory:  Negative for cough and shortness of breath.    Cardiovascular:  Negative for chest pain.   Gastrointestinal:  Negative for abdominal pain and diarrhea.   Genitourinary:  Negative for frequency.   Musculoskeletal:  Negative for back pain.   Integumentary:  Negative for rash.   Neurological:  Positive for numbness.  Negative for headaches.   Hematological:  Negative for adenopathy.   Psychiatric/Behavioral:  The patient is not nervous/anxious.           Objective       Vitals:    12/24/24 0928   BP: 133/74   BP Location: Left arm   Patient Position: Sitting   Pulse: 64   Resp: 16   SpO2: 95%   Weight: 97.5 kg (214 lb 15.2 oz)   Height: 6' (1.829 m)       Physical Exam  Constitutional:       Appearance: He is well-developed.   HENT:      Head: Normocephalic.      Mouth/Throat:      Pharynx: No oropharyngeal exudate.   Eyes:      General: No scleral icterus.        Right eye: No discharge.         Left eye: No discharge.      Conjunctiva/sclera: Conjunctivae normal.   Neck:      Thyroid: No thyromegaly.   Cardiovascular:      Rate and Rhythm: Normal rate and regular rhythm.      Heart sounds: Normal heart sounds. No murmur heard.  Pulmonary:      Effort: Pulmonary effort is normal.      Breath sounds: Normal breath sounds. No wheezing or rales.   Abdominal:      General: Bowel sounds are normal.      Palpations: Abdomen is soft.      Tenderness: There is no abdominal tenderness. There is no guarding or rebound.   Musculoskeletal:         General: No swelling. Normal range of motion.      Cervical back: Normal range of motion and neck supple.   Lymphadenopathy:      Cervical: No cervical adenopathy.      Upper Body:      Right upper body: No supraclavicular adenopathy.      Left upper body: No supraclavicular adenopathy.   Skin:     Findings: No erythema or rash.   Neurological:      Mental Status: He is alert and oriented to person, place, and time.      Cranial Nerves: No cranial nerve deficit.   Psychiatric:         Mood and Affect: Mood normal.         Behavior: Behavior normal.              Pathology 4/4/23  Small intestine, segmental resection:   - Invasive adenocarcinoma with partial mucinous component (40% of tumor)       - Tumor invades through muscularis propria into subserosa   - Lymphovascular invasion is present   -  17 lymph nodes, 3 positive for metastatic carcinoma (3/17)   - Resection margins free of tumor   - Background small intestinal mucosa without significant histopathologic alteration   - See CAP Tumor synoptic and comment     CAP Tumor Synoptic:   Procedure:  Segmental resection   Tumor site:  Small intestine, not otherwise specified   Histologic type:  Adenocarcinoma (not otherwise characterized), with mucinous component comprising approximately 40% of tumor   Histologic grade:  G2, moderately differentiated   Tumor size:  5.5 cm in greatest dimension   Tumor extent:  Invades through muscularis propria into subserosa without serosal penetration   Lymphovascular invasion:  Present   Macroscopic tumor perforation:  Not identified   Margin status for invasive carcinoma:  All margins negative for invasive carcinoma   Margin status for dysplasia:  All margins negative for carcinoma in situ (high-grade dysplasia)/ adenoma   Regional lymph node status:  Tumor present in regional lymph nodes   Number of lymph nodes with tumor:  3   Number of lymph nodes examined:  17   PATHOLOGIC STAGE CLASSIFICATION   TNM descriptors:  Not applicable   pT Category:  pT3   pN Category:  pN2   Special studies:  Intact expression of DNA mismatch repair proteins (MLH1, MSH2, MSH6, PMS2) in tumor by immunohistochemistry (performed on prior biopsy S-, collected 3/20/2023); interpreted by Dr. Garcias)   Comment:  Records from this patient's reported recent colonoscopy are not available for review.       CT a/p w/contrast 4/13/23  Impression:     Postoperative changes from partial small-bowel resection for a small bowel tumor.  Pathology results are pending.     Skin thickening and subcutaneous edema near the umbilicus, which could be inflammatory or infectious.  No organized fluid collections along the surgical incision.     Dilated small bowel loops with gradual tapering distally, likely ileus in the early postoperative setting.      Small volume ascites and trace pneumoperitoneum, likely postoperative.  Anastomotic leak is less likely.  No organized intraperitoneal collections.     Other findings as described.        CT chest w/out contrast  5/1/23   Impression:     Asymmetric thyroid gland with possible left lobe thyroid nodule measuring up to 2.2 cm.  Further evaluation with thyroid ultrasound examination is recommended.     Mild centrilobular emphysematous changes.     Mucous plugging within the left upper lobe laterally and within the right lower lobe laterally.     Cluster of pulmonary nodules within the upper left upper lobe measuring up to 0.6 cm in size.  These are likely infectious/inflammatory in etiology, however continued surveillance is recommended in this patient with history of malignancy.     Hepatic cysts.         CT c/a/p 7/3/2023   Impression:     1. Postsurgical changes compatible with partial small bowel resection with enteroenteric anastomosis and surgical sutures in the region left upper quadrant, not significantly changed.  2. Near complete interval resolution of previously noted cluster of pulmonary nodules in the left upper lobe.  Cluster of approximately 3 ground-glass pulmonary micro nodules in the left upper lobe and, second cluster of approximately 3 ground-glass pulmonary micro nodules in the right lower lobe, not significantly changed.  A few borderline enlarged mediastinal lymph nodes, not significantly changed.  3. Multiple simple appearing hepatic parenchymal cyst and multiple additional subcentimeter hypodensities that are too small to accurately characterize, not significantly changed.  4. 0.4-cm parenchymal hypodensity in the pancreatic head, not significantly changed.  5. Simple appearing renal cortical cystic lesion in the lateral aspect of the right midpole, not significantly changed. Multiple additional subcentimeter renal cortical hypodensities bilaterally are too small to accurately characterize but  not significantly changed when compared to prior.        Lab Results   Component Value Date    WBC 3.06 (L) 12/13/2024    HGB 14.3 12/13/2024    HCT 43.5 12/13/2024    MCV 95 12/13/2024     12/13/2024       Hep B Core Total Ab Non-reactive Reactive Abnormal          Hepatitis B Surface Ag Non-reactive Non-reactive    Hep B C IgM Non-reactive Non-reactive    Hep A IgM Non-reactive Non-reactive    Hepatitis C Ab Non-reactive Non-reactive        Component      Latest Ref Rng 7/7/2023 7/24/2023 9/8/2023 11/7/2023   CEA      0.0 - 5.0 ng/mL 2.4  2.4  3.0  3.8       Latest Reference Range & Units 12/08/23 09:51 02/08/24 09:34   CEA 0.0 - 5.0 ng/mL 3.2 2.7        Latest Reference Range & Units Most Recent 09/13/24 07:57   CEA 0.0 - 5.0 ng/mL 2.2  9/13/24 07:57 2.2         CT c/a/p w/ IV contrast 11/29/23  Impression:     1. Postsurgical changes compatible with partial small bowel resection with enteroenteric anastomosis and surgical sutures in the region left upper quadrant, not significantly changed.  2. Cluster of micro nodules in the bilateral upper lobes and right lower lobe and, a few borderline enlarged mediastinal lymph nodes, not significantly changed.  3. Multiple simple appearing hepatic parenchymal cyst and multiple additional subcentimeter hypodensities that are too small to accurately characterize, not significantly changed.  4. 0.4-cm parenchymal hypodensity in the pancreatic head, not significantly changed.  5. Simple appearing right midpole renal cortical cystic lesion urine, multiple additional subcentimeter renal cortical hypodensities bilaterally are too small to accurately characterize but not significantly changed when compared to p        CT c/a/p w/contrast 5/6/24  Impression:     In patient with history small bowel adenocarcinoma, there are postoperative changes of prior partial small bowel resection.  No evidence of new or worsening metastatic disease in the chest, abdomen or pelvis.      Additional findings in the body of the report.     There are no measurable lesions per RECIST criteria.      EXAMINATION:  CT CHEST ABDOMEN PELVIS WITH IV CONTRAST (XPD) 12/13/24      CLINICAL HISTORY:  Metastatic disease evaluation;     TECHNIQUE:  5 mm axial images were obtained through the chest, abdomen and pelvis following administration of 100 cc of Omnipaque 350 IV contrast and oral contrast.  Coronal and sagittal reformats were performed.     COMPARISON:  CT 05/06/2024, 07/03/2023, thyroid ultrasound 05/01/2024.     FINDINGS:  Base of the neck: Enlarged multinodular left thyroid lobe, better evaluated on previous ultrasound.     Thoracic Aorta: Left-sided aortic arch.  No aneurysm.  No dissection.     Pulmonary Arteries: Distribute normally.  No filling defects to suggest a thromboembolus.     Heart: Normal in size.    No pericardial effusion.  Pulmonary veins appear unremarkable.     Thoracic lymph nodes: No axillary, supraclavicular, mediastinal or hilar lymphadenopathy.     Esophagus: Normal in caliber and course.     Trachea and proximal airways: Patent.     Lungs: Volumes are normal and symmetric.  Stable bilateral solid pulmonary nodules.  No new or enlarging pulmonary nodules.  No mass, consolidation, pneumothorax or pleural effusion. No bronchiectasis.     Liver: Normal in size.  Numerous hepatic cysts, stable when compared to the previous exam.  No enhancing hepatic lesions.  No intrahepatic bile duct dilatation.Portal vein, splenic vein and SMV are patent.     Gallbladder: Unremarkable.  Extra-hepatic bile ducts are normal in caliber.     Stomach: Unremarkable.     Duodenum: Unremarkable.     Spleen: Normal in size. No focal lesions.     Pancreas: Unremarkable.     Adrenal glands: Unremarkable     Kidneys: Normal in size and location.  Bilateral renal cyst and too small to characterize hypodensities.  No cortical enhancing lesions.  No nephrolithiasis.  No hydronephrosis or hydroureter.  Bladder  is fluid filled and unremarkable.     Genital organs: Prostate is enlarged.No pelvic free fluid.     Bowel: Postsurgical changes of partial small-bowel resection.  Residual small bowel is normal in caliber without lesions.  Scattered colonic diverticuli.  Appendix is normal.  No obstruction or inflammatory changes.     Aorta: Celiac artery, SMA, bilateral renal arteries and HERMANN are patent.  No aneurysm.  No dissection.     IVC/Veins: Not well evaluated secondary to incomplete contrast opacification.     Peritoneum/mesentery: No ascites or intra-abdominal free air.  No focal mesenteric masses.     Lymph nodes: No lymphadenopathy.     Subcutaneous soft tissues: Right chest port with catheter tip at the cavoatrial junction.  Diastasis recti with bowel extending into the defect.  Right iliopsoas intramuscular lipoma.     Osseous structures: Advanced degenerative changes of the lumbar spine and left femoroacetabular joint.  No acute fractures. No suspicious lytic or blastic lesions.     Impression:     1. Patient with a history of small bowel adenocarcinoma status post partial small-bowel resection.  No evidence of metastatic disease in the chest, abdomen or pelvis.  2. Additional findings as detailed in the body of the report.  There are no measurable lesions per RECIST criteria.                 Assessment and Plan     Problem List Items Addressed This Visit           Diagnoses and all orders for this visit:    Hx of Adenocarcinoma of small bowel  Poli Young is a 67 y.o. male with biopsy proven small bowel adenocarcinoma  status post small bowel resection on 4/4/23.pT3 pN2M0  CT a/p no evidence of distant mets  S/p C12 adjuvant chemo FOLFOX completed 11/22/23  Follow up CT imaging 5/6/24- INES recurrence   ctDNA -signatera NEG 9/2024 -neg  Labs reviewed  Follow up in 3mos with labs  Plan follow up CT imaging     -- There are no guidelines for post-treatment surveillance for small bowel adenocarcinoma from the  "American Society of Clinical Oncology (ASCO) or the European Society of Medical Oncology (ESMO). Guidelines from the NCCN generally follow a similar approach as for resected colon cancer, but with some exceptions [5] (see "  ?History and physical examination every 3 to 6 months for two years, then every 6 months for a total of five years  ?Chest/abdomen/pelvic CT every 6 to 12 months for two years, then annually for a total of five years  ?Assay of tumor markers CEA and/or CA 19-9-every 3 to 6 months for two years, then every 6 months for a total of five years  ?Routine capsule endoscopy is not indicated          Hep B core antibody positive   F/b  Hepatology  Last dose of chemo 11/2023, can stop Vemlidy 5/2025 according to Hepatology  Sero w/u labs negative, alk phos and liver enzymes and fibroscan all returned to normal   Cont Vemlidy until may 2025     Thyroid nodule  US thyroid complete and nodule bx with benign results    Pulmonary nodules  CT imaging 12/13/2024 -Stable bilateral pulm nodules  Monitor     Chemotherapy-induced neuropathy  improved    Visit today included increased complexity associated with the care of the episodic problem pulmonary nodule addressed and managing the longitudinal care of the patient due to the serious and/or complex managed problem(s) history of adenocarcinoma of small bowel        PAC flush today -send to chemo for PAC flush-CONFIRM w/chemo  Cbc,cmp, CEA , Fe studies  prior to f/u 3MO              Amparo Michaels MD  Heme/Onc [facility]        "

## 2024-12-24 NOTE — PLAN OF CARE
Pt arrived to unit, ambulatory, for port a cath flush. Pt alert and oriented with no new or worsening complaints upon arrival.     Port accessed using sterile technique - flushes well with blood return present and heparin locked.     Pt discharged home upon completion in [facility].

## 2024-12-24 NOTE — Clinical Note
PAC flush today -send to chemo for PAC flush-CONFIRM w/chemo Cbc,cmp, CEA , Fe studies  prior to f/u 3MO

## 2025-01-20 DIAGNOSIS — B96.81 CHRONIC HELICOBACTER PYLORI GASTRITIS: ICD-10-CM

## 2025-01-20 DIAGNOSIS — K29.50 CHRONIC HELICOBACTER PYLORI GASTRITIS: ICD-10-CM

## 2025-01-27 RX ORDER — PANTOPRAZOLE SODIUM 20 MG/1
20 TABLET, DELAYED RELEASE ORAL DAILY
Qty: 30 TABLET | Refills: 0 | Status: SHIPPED | OUTPATIENT
Start: 2025-01-27 | End: 2025-02-26

## 2025-02-18 ENCOUNTER — RESULTS FOLLOW-UP (OUTPATIENT)
Dept: HEPATOLOGY | Facility: CLINIC | Age: 70
End: 2025-02-18

## 2025-02-25 DIAGNOSIS — B96.81 CHRONIC HELICOBACTER PYLORI GASTRITIS: ICD-10-CM

## 2025-02-25 DIAGNOSIS — K29.50 CHRONIC HELICOBACTER PYLORI GASTRITIS: ICD-10-CM

## 2025-02-26 ENCOUNTER — TELEPHONE (OUTPATIENT)
Dept: ENDOCRINOLOGY | Facility: CLINIC | Age: 70
End: 2025-02-26
Payer: MEDICARE

## 2025-02-26 DIAGNOSIS — E04.1 THYROID NODULE: Primary | ICD-10-CM

## 2025-02-27 ENCOUNTER — OFFICE VISIT (OUTPATIENT)
Dept: HEPATOLOGY | Facility: CLINIC | Age: 70
End: 2025-02-27
Payer: MEDICARE

## 2025-02-27 VITALS — WEIGHT: 213.19 LBS | HEIGHT: 72 IN | BODY MASS INDEX: 28.88 KG/M2

## 2025-02-27 DIAGNOSIS — K76.89 LIVER CYST: ICD-10-CM

## 2025-02-27 DIAGNOSIS — R76.8 HEPATITIS B CORE ANTIBODY POSITIVE: Primary | ICD-10-CM

## 2025-02-27 PROCEDURE — 1159F MED LIST DOCD IN RCRD: CPT | Mod: CPTII,S$GLB,, | Performed by: NURSE PRACTITIONER

## 2025-02-27 PROCEDURE — 1126F AMNT PAIN NOTED NONE PRSNT: CPT | Mod: CPTII,S$GLB,, | Performed by: NURSE PRACTITIONER

## 2025-02-27 PROCEDURE — 1101F PT FALLS ASSESS-DOCD LE1/YR: CPT | Mod: CPTII,S$GLB,, | Performed by: NURSE PRACTITIONER

## 2025-02-27 PROCEDURE — 3008F BODY MASS INDEX DOCD: CPT | Mod: CPTII,S$GLB,, | Performed by: NURSE PRACTITIONER

## 2025-02-27 PROCEDURE — 3288F FALL RISK ASSESSMENT DOCD: CPT | Mod: CPTII,S$GLB,, | Performed by: NURSE PRACTITIONER

## 2025-02-27 PROCEDURE — 1160F RVW MEDS BY RX/DR IN RCRD: CPT | Mod: CPTII,S$GLB,, | Performed by: NURSE PRACTITIONER

## 2025-02-27 PROCEDURE — 99999 PR PBB SHADOW E&M-EST. PATIENT-LVL III: CPT | Mod: PBBFAC,,, | Performed by: NURSE PRACTITIONER

## 2025-02-27 PROCEDURE — 99214 OFFICE O/P EST MOD 30 MIN: CPT | Mod: S$GLB,,, | Performed by: NURSE PRACTITIONER

## 2025-02-27 RX ORDER — TENOFOVIR ALAFENAMIDE 25 MG/1
25 TABLET ORAL DAILY
Qty: 30 TABLET | Refills: 1 | Status: ACTIVE | OUTPATIENT
Start: 2025-02-27

## 2025-02-27 NOTE — PATIENT INSTRUCTIONS
Previous fibroscan looks great and is normal - great news!  Labs normalized  Continue Vemlidy 25 mg daily for Hep B protection until 5/2025. I will message Dr. Michaels to make sure its fine that you stop it in May   Liver cysts stable, no concerning findings

## 2025-02-27 NOTE — TELEPHONE ENCOUNTER
----- Message from Edgardo Villatoro MD sent at 7/5/2024 10:25 AM CDT -----  Please schedule US thyroid and follow up with me in clinic around 5/2025

## 2025-02-27 NOTE — PROGRESS NOTES
[facility]    PCP: Rola Feliz FNP     CHIEF COMPLAINT: Hep B core antibody positive, liver cyst     HPI: This is a 69 y.o. male with PMH noted below, presenting for follow up of above    Serological workup was negative for hemochromatosis, Lebron's, alpha-1 antitrypsin deficiency,  and viral hepatitis A, B and C   Negative AMA    Prior serologic workup:   Lab Results   Component Value Date    AMAIFA Negative 1:40 11/20/2023    FERRITIN 161 12/13/2024    FESATURATED 21 12/13/2024    CERULOPLSM 27.0 11/20/2023    HEPBSAG Non-reactive 02/18/2025    HEPCAB Non-reactive 08/04/2023    HEPAIGM Non-reactive 08/04/2023     Liver fibrosis staging  -- fibroscan 11/2023 noted F2, S0 (kPA 9.7, )  -- fibroscan 2/2024 noted F0, S0 (kPA 5.1, )      Interval HPI: Presents today alone.  + Hep B core, on Vemlidy for Hep B prophylaxis given h/o chemo for small bowel cancer  Of note: being followed by Dr. Michaels, hem/onc for adenocarcinoma of small bowel, Last dose of chemo 11/2023, can stop Vemlidy 5/2025    Labs done 2/2025 note normal alk phos       Lab Results   Component Value Date    ALT 18 02/18/2025    AST 22 02/18/2025    ALKPHOS 105 02/18/2025    BILITOT 0.6 02/18/2025    ALBUMIN 4.3 02/18/2025    INR 1.0 06/08/2022     12/13/2024     US 2/2025 noted Measures 16.7 cm, upper limits of normal in size. Homogeneous echotexture. Multiple liver cysts ranging from simple to minimally complex. Largest cyst in the left lobe measuring 5.2 x 3.2 x 5.9 cm and more centrally near the apoorva hepatis measuring 6.1 x 3.8 x 4.9 cm.     CT done 12/2024 noted Normal in size. Numerous hepatic cysts, stable when compared to the previous exam. No enhancing hepatic lesions. No intrahepatic bile duct dilatation.Portal vein, splenic vein and SMV are patent.       Lab Results   Component Value Date    AFP <2.0 11/20/2023       +Immunity to Hep B: previously given Hep A vaccine instructions         Allergy and medication list reviewed and updated     PMHX:  has a past medical history of Hyperlipemia and Hypertension.    PSHX:  has a past surgical history that includes Esophagogastroduodenoscopy (N/A, 6/8/2022); Intraluminal gastrointestinal tract imaging via capsule (N/A, 1/20/2023); Small bowel enteroscopy (N/A, 3/20/2023); Diagnostic laparoscopy (N/A, 4/4/2023); excision, small intestine (N/A, 4/4/2023); laparotomy, exploratory (N/A, 4/4/2023); and Insertion of venous access port (Right, 5/2/2023).    FAMILY HISTORY: Updated and reviewed in [facility]    SOCIAL HISTORY:   Social History     Substance and Sexual Activity   Alcohol Use Not Currently    Comment: daily use for years, stopped       Social History     Substance and Sexual Activity   Drug Use Never       ROS:   GENERAL: Denies fatigue  CARDIOVASCULAR: Denies edema  GI: Denies abdominal pain  SKIN: Denies rash, itching   NEURO: Denies confusion, memory loss, or mood changes    PHYSICAL EXAM:   Friendly Black or  male, in no acute distress; alert and oriented to person, place and time  VITALS: Ht 6' (1.829 m)   Wt 96.7 kg (213 lb 3 oz)   BMI 28.91 kg/m²   EYES: Sclerae anicteric  GI: Soft, non-tender, non-distended. No ascites.  EXTREMITIES:  No edema.  SKIN: Warm and dry. No jaundice. No telangectasias noted. No palmar erythema.  NEURO:  No asterixis.  PSYCH:  Thought and speech pattern appropriate. Behavior normal      EDUCATION:  See instructions discussed with patient in Instructions section of the After Visit Summary     ASSESSMENT & PLAN:  69 y.o. male with:  1. Hep B core positive Ab, no chronic Hep B  -- on Vemlidy for prophylaxis given h/o chemo use. Continue until 5/2025, can stop then if no immunosuppression in the meantime, will message Dr. Michaels after March visit scheduled   -- transaminases returned to normal  -- Hep B labs: see HPI  -- synthetic liver function WNL  -- immunity to Hep A: see HPI    3. H/o  adenocarcinoma of small bowel,  Chemo use  -- continue Hep B prophylaxis, Vemlidy 25 mg daily given recent chemo use  -- will continue Hep B prophylaxis for 12-18 months after last dose of chemo (May 2025)   labs (Hep B DNA and CMP) for at least 12 months after last dose of Hep B prophylaxis    4. Liver cysts  Given size, can repeat imaging q1-2 years. Has imaging routinely with oncology so can follow along             No follow-ups on file. Likely none needed, will re-evaluate in March after oncology f/u  No orders of the defined types were placed in this encounter.       Thank you for allowing me to participate in the care of Poli Young    JOHN JeffersonC    I spent a total of 30 minutes on the day of the visit.This includes face to face time and non-face to face time preparing to see the patient (eg, review of tests), obtaining and/or reviewing separately obtained history, documenting clinical information in the electronic or other health record, independently interpreting results and communicating results to the patient/family/caregiver, and coordinating care.         CC'ed note to:   Dr. Michaels

## 2025-03-04 ENCOUNTER — RESULTS FOLLOW-UP (OUTPATIENT)
Dept: ENDOCRINOLOGY | Facility: CLINIC | Age: 70
End: 2025-03-04

## 2025-03-05 NOTE — PROGRESS NOTES
Progress Note  General Surgery    Admit Date: 4/4/2023  S/P: Procedure(s) (LRB):  LAPAROSCOPY, DIAGNOSTIC (N/A)  EXCISION, SMALL INTESTINE/SMALL BOWEL RESECTION (N/A)  LAPAROTOMY, EXPLORATORY (N/A)    Post-operative Day: 5 Days Post-Op    Hospital Day: 6    SUBJECTIVE:     No acute events overnight. Patient states pain is controlled, still not much flatus, feels bloated, K 5.7  OBJECTIVE:     Vital Signs (Most Recent)  Temp: 98.3 °F (36.8 °C) (04/09/23 0820)  Pulse: 79 (04/09/23 0820)  Resp: 18 (04/09/23 0822)  BP: (!) 152/79 (04/09/23 0820)  SpO2: 95 % (04/09/23 0820)    Vital Signs Range (Last 24H):  Temp:  [98.3 °F (36.8 °C)-99.2 °F (37.3 °C)]   Pulse:  [66-82]   Resp:  [18-20]   BP: (128-152)/(70-79)   SpO2:  [92 %-98 %]     I & O (Last 24H):  Intake/Output Summary (Last 24 hours) at 4/9/2023 0900  Last data filed at 4/9/2023 0454  Gross per 24 hour   Intake 240 ml   Output 600 ml   Net -360 ml       Physical Exam:  Gen: NAD   HEENT: NCAT  Pulm: unlabored, symmetrical   Abd: Soft, nttp. No rebound, guarding.     Wound/Incision:  clean, dry, intact    Laboratory:  Labs within the past 24 hours have been reviewed.    ASSESSMENT/PLAN:     Assessment/Plan:  Keep on fulls  IVFs changed to NS, recheck K  Needs to ambulate    Sanjeev Figueroa M.D., F.A.C.S.  [facility]  [facility] [place] [facility] [place]           ambulatory

## 2025-03-25 ENCOUNTER — INFUSION (OUTPATIENT)
Dept: INFUSION THERAPY | Facility: HOSPITAL | Age: 70
End: 2025-03-25
Payer: MEDICARE

## 2025-03-25 ENCOUNTER — LAB VISIT (OUTPATIENT)
Dept: LAB | Facility: HOSPITAL | Age: 70
End: 2025-03-25
Payer: MEDICARE

## 2025-03-25 ENCOUNTER — OFFICE VISIT (OUTPATIENT)
Dept: HEMATOLOGY/ONCOLOGY | Facility: CLINIC | Age: 70
End: 2025-03-25
Payer: MEDICARE

## 2025-03-25 VITALS
DIASTOLIC BLOOD PRESSURE: 67 MMHG | TEMPERATURE: 98 F | SYSTOLIC BLOOD PRESSURE: 141 MMHG | OXYGEN SATURATION: 99 % | HEART RATE: 56 BPM | RESPIRATION RATE: 18 BRPM

## 2025-03-25 VITALS
WEIGHT: 212.06 LBS | DIASTOLIC BLOOD PRESSURE: 80 MMHG | TEMPERATURE: 98 F | HEART RATE: 54 BPM | OXYGEN SATURATION: 98 % | SYSTOLIC BLOOD PRESSURE: 130 MMHG | BODY MASS INDEX: 28.72 KG/M2 | HEIGHT: 72 IN

## 2025-03-25 DIAGNOSIS — C17.9 ADENOCARCINOMA OF SMALL BOWEL: Primary | ICD-10-CM

## 2025-03-25 DIAGNOSIS — C17.9 ADENOCARCINOMA OF SMALL BOWEL: ICD-10-CM

## 2025-03-25 DIAGNOSIS — G62.0 CHEMOTHERAPY-INDUCED NEUROPATHY: ICD-10-CM

## 2025-03-25 DIAGNOSIS — E04.1 THYROID NODULE: ICD-10-CM

## 2025-03-25 DIAGNOSIS — R91.8 PULMONARY NODULES: ICD-10-CM

## 2025-03-25 DIAGNOSIS — R76.8 HEPATITIS B CORE ANTIBODY POSITIVE: ICD-10-CM

## 2025-03-25 DIAGNOSIS — T45.1X5A CHEMOTHERAPY-INDUCED NEUROPATHY: ICD-10-CM

## 2025-03-25 LAB
ABSOLUTE EOSINOPHIL (OHS): 0.11 K/UL
ABSOLUTE MONOCYTE (OHS): 0.36 K/UL (ref 0.3–1)
ABSOLUTE NEUTROPHIL COUNT (OHS): 2.01 K/UL (ref 1.8–7.7)
ALBUMIN SERPL BCP-MCNC: 4.4 G/DL (ref 3.5–5.2)
ALP SERPL-CCNC: 104 UNIT/L (ref 40–150)
ALT SERPL W/O P-5'-P-CCNC: 18 UNIT/L (ref 10–44)
ANION GAP (OHS): 9 MMOL/L (ref 8–16)
AST SERPL-CCNC: 24 UNIT/L (ref 11–45)
BASOPHILS # BLD AUTO: 0.01 K/UL
BASOPHILS NFR BLD AUTO: 0.2 %
BILIRUB SERPL-MCNC: 0.6 MG/DL (ref 0.1–1)
BUN SERPL-MCNC: 15 MG/DL (ref 8–23)
CALCIUM SERPL-MCNC: 9.8 MG/DL (ref 8.7–10.5)
CARCINOEMBRYONIC ANTIGEN (OHS): 2 NG/ML
CHLORIDE SERPL-SCNC: 105 MMOL/L (ref 95–110)
CO2 SERPL-SCNC: 26 MMOL/L (ref 23–29)
CREAT SERPL-MCNC: 1 MG/DL (ref 0.5–1.4)
ERYTHROCYTE [DISTWIDTH] IN BLOOD BY AUTOMATED COUNT: 13.6 % (ref 11.5–14.5)
GFR SERPLBLD CREATININE-BSD FMLA CKD-EPI: >60 ML/MIN/1.73/M2
GLUCOSE SERPL-MCNC: 101 MG/DL (ref 70–110)
HCT VFR BLD AUTO: 44 % (ref 40–54)
HGB BLD-MCNC: 14.6 GM/DL (ref 14–18)
IMM GRANULOCYTES # BLD AUTO: 0.02 K/UL (ref 0–0.04)
IMM GRANULOCYTES NFR BLD AUTO: 0.5 % (ref 0–0.5)
LYMPHOCYTES # BLD AUTO: 1.8 K/UL (ref 1–4.8)
MCH RBC QN AUTO: 31.2 PG (ref 27–50)
MCHC RBC AUTO-ENTMCNC: 33.2 G/DL (ref 32–36)
MCV RBC AUTO: 94 FL (ref 82–98)
NUCLEATED RBC (/100WBC) (OHS): 0 /100 WBC
PLATELET # BLD AUTO: 180 K/UL (ref 150–450)
PMV BLD AUTO: 8.6 FL (ref 9.2–12.9)
POTASSIUM SERPL-SCNC: 4.4 MMOL/L (ref 3.5–5.1)
PROT SERPL-MCNC: 8.7 GM/DL (ref 6–8.4)
RBC # BLD AUTO: 4.68 M/UL (ref 4.6–6.2)
RELATIVE EOSINOPHIL (OHS): 2.6 %
RELATIVE LYMPHOCYTE (OHS): 41.8 % (ref 18–48)
RELATIVE MONOCYTE (OHS): 8.4 % (ref 4–15)
RELATIVE NEUTROPHIL (OHS): 46.5 % (ref 38–73)
SODIUM SERPL-SCNC: 140 MMOL/L (ref 136–145)
WBC # BLD AUTO: 4.31 K/UL (ref 3.9–12.7)

## 2025-03-25 PROCEDURE — 3079F DIAST BP 80-89 MM HG: CPT | Mod: CPTII,S$GLB,, | Performed by: INTERNAL MEDICINE

## 2025-03-25 PROCEDURE — 3075F SYST BP GE 130 - 139MM HG: CPT | Mod: CPTII,S$GLB,, | Performed by: INTERNAL MEDICINE

## 2025-03-25 PROCEDURE — 1101F PT FALLS ASSESS-DOCD LE1/YR: CPT | Mod: CPTII,S$GLB,, | Performed by: INTERNAL MEDICINE

## 2025-03-25 PROCEDURE — 96523 IRRIG DRUG DELIVERY DEVICE: CPT

## 2025-03-25 PROCEDURE — 3288F FALL RISK ASSESSMENT DOCD: CPT | Mod: CPTII,S$GLB,, | Performed by: INTERNAL MEDICINE

## 2025-03-25 PROCEDURE — 36415 COLL VENOUS BLD VENIPUNCTURE: CPT

## 2025-03-25 PROCEDURE — 99999 PR PBB SHADOW E&M-EST. PATIENT-LVL V: CPT | Mod: PBBFAC,,, | Performed by: INTERNAL MEDICINE

## 2025-03-25 PROCEDURE — 99214 OFFICE O/P EST MOD 30 MIN: CPT | Mod: S$GLB,,, | Performed by: INTERNAL MEDICINE

## 2025-03-25 PROCEDURE — 3008F BODY MASS INDEX DOCD: CPT | Mod: CPTII,S$GLB,, | Performed by: INTERNAL MEDICINE

## 2025-03-25 PROCEDURE — 82378 CARCINOEMBRYONIC ANTIGEN: CPT

## 2025-03-25 PROCEDURE — 82947 ASSAY GLUCOSE BLOOD QUANT: CPT

## 2025-03-25 PROCEDURE — 1126F AMNT PAIN NOTED NONE PRSNT: CPT | Mod: CPTII,S$GLB,, | Performed by: INTERNAL MEDICINE

## 2025-03-25 PROCEDURE — 4010F ACE/ARB THERAPY RXD/TAKEN: CPT | Mod: CPTII,S$GLB,, | Performed by: INTERNAL MEDICINE

## 2025-03-25 PROCEDURE — 63600175 PHARM REV CODE 636 W HCPCS: Performed by: INTERNAL MEDICINE

## 2025-03-25 PROCEDURE — 85025 COMPLETE CBC W/AUTO DIFF WBC: CPT

## 2025-03-25 RX ORDER — HEPARIN 100 UNIT/ML
500 SYRINGE INTRAVENOUS
Status: DISCONTINUED | OUTPATIENT
Start: 2025-03-25 | End: 2025-03-25 | Stop reason: HOSPADM

## 2025-03-25 RX ORDER — SODIUM CHLORIDE 0.9 % (FLUSH) 0.9 %
10 SYRINGE (ML) INJECTION
OUTPATIENT
Start: 2025-03-25

## 2025-03-25 RX ORDER — HEPARIN 100 UNIT/ML
500 SYRINGE INTRAVENOUS
OUTPATIENT
Start: 2025-03-25

## 2025-03-25 RX ADMIN — HEPARIN 500 UNITS: 100 SYRINGE at 11:03

## 2025-03-25 NOTE — PLAN OF CARE
Patient presented to unit for a port flush. VSS. No complaints voiced. Port with blood return flushed with NS and heparin locked. Ambulatory and in NAD at time of discharge.       Problem: Infection  Goal: Absence of Infection Signs and Symptoms  Outcome: Progressing

## 2025-03-25 NOTE — PROGRESS NOTES
Subjective       Patient ID: Poli Young is a 69 y.o. male.    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 Heunderwent work up for anemia and was found to have a small bowel mass about 100cm from IC valve by enteroscopy by Dr Cruz. Got one blood transfusion. He reports history of left central intermittent abdominal pain for several months. No NV. Was found to be anemic on bloodwork. EGD in June 2022 did not show any obvious cause of bleeding.  Capsule endoscopy in January showed blood in small bowel. Enteroscopy showed mass in March that was biopsied and tattooed. Biopsy positive for adenocarcinoma. He is status post small bowel resection on 4/4/23. Pathology positive for  Invasive adenocarcinoma with partial mucinous component (40% of tumor) Lymphovascular invasion is present 17 lymph nodes, 3 positive for metastatic carcinoma (3/17) .Resection margins free of tumor  Pathologic stage pT3 pN Category:  pN2 CT a/p w/contrast 4/13/23 shows Postoperative changes from partial small-bowel resection for a small bowel tumor . No evidence of distant metastases.      Interval Hx: Doing well  No new issues   No melena, hematochezia or change in bowel habits intermittent tingling/numbness in extremities feet> hands -improved    remains on  Vemlidy for pos Hep B  core     Sochx: Retired . Never smoked. Drinks etoh occasionally.   .       Past Medical History:   Diagnosis Date    Hyperlipemia     Hypertension        Past Surgical History:   Procedure Laterality Date    DIAGNOSTIC LAPAROSCOPY N/A 4/4/2023    Procedure: LAPAROSCOPY, DIAGNOSTIC;  Surgeon: Tyrone Martínez MD;  Location: [facility];  Service: General;  Laterality: N/A;     ESOPHAGOGASTRODUODENOSCOPY N/A 6/8/2022    Procedure: EGD (ESOPHAGOGASTRODUODENOSCOPY);  Surgeon: Aamir Rushing MD;  Location: [facility] ENDO;  Service: Endoscopy;  Laterality: N/A;    EXCISION, SMALL INTESTINE N/A 4/4/2023    Procedure: EXCISION, SMALL INTESTINE/SMALL BOWEL RESECTION;  Surgeon: Tyrone Martínez MD;  Location: [facility] OR;  Service: General;  Laterality: N/A;    INSERTION OF VENOUS ACCESS PORT Right 5/2/2023    Procedure: INSERTION, VENOUS ACCESS PORT;  Surgeon: Tyrone Martínez MD;  Location: [facility] OR;  Service: General;  Laterality: Right;    INTRALUMINAL GASTROINTESTINAL TRACT IMAGING VIA CAPSULE N/A 1/20/2023    Procedure: IMAGING PROCEDURE, GI TRACT, INTRALUMINAL, VIA CAPSULE;  Surgeon: Aamir Rushing MD;  Location: [facility] ENDO;  Service: Endoscopy;  Laterality: N/A;    LAPAROTOMY, EXPLORATORY N/A 4/4/2023    Procedure: LAPAROTOMY, EXPLORATORY;  Surgeon: Tyrone Martínez MD;  Location: [facility] OR;  Service: General;  Laterality: N/A;    SMALL BOWEL ENTEROSCOPY N/A 3/20/2023    Procedure: ENTEROSCOPY Upper DBE;  Surgeon: Giancarlo Cruz MD;  Location: [facility];  Service: Endoscopy;  Laterality: N/A;        Social History     Tobacco Use    Smoking status: Never    Smokeless tobacco: Never   Substance Use Topics    Alcohol use: Not Currently     Comment: daily use for years, stopped    Drug use: Never        Review of Systems   Constitutional:  Negative for appetite change, fatigue, fever and unexpected weight change.   HENT:  Negative for mouth sores.    Eyes:  Negative for visual disturbance.   Respiratory:  Negative for cough and shortness of breath.    Cardiovascular:  Negative for chest pain.   Gastrointestinal:  Negative for abdominal pain and diarrhea.   Genitourinary:  Negative for frequency.   Musculoskeletal:  Negative for back pain.   Integumentary:  Negative for rash.   Neurological:  Positive for numbness. Negative for headaches.   Hematological:  Negative for adenopathy.    Psychiatric/Behavioral:  The patient is not nervous/anxious.           Objective       Vitals:    03/25/25 0910   BP: 130/80   BP Location: Left arm   Patient Position: Sitting   Pulse: (!) 54   Temp: 98 °F (36.7 °C)   SpO2: 98%   Weight: 96.2 kg (212 lb 1.3 oz)   Height: 6' (1.829 m)       Physical Exam  Constitutional:       Appearance: He is well-developed.   HENT:      Head: Normocephalic.      Mouth/Throat:      Pharynx: No oropharyngeal exudate.   Eyes:      General: No scleral icterus.        Right eye: No discharge.         Left eye: No discharge.      Conjunctiva/sclera: Conjunctivae normal.   Neck:      Thyroid: No thyromegaly.   Cardiovascular:      Rate and Rhythm: Normal rate and regular rhythm.      Heart sounds: Normal heart sounds. No murmur heard.  Pulmonary:      Effort: Pulmonary effort is normal.      Breath sounds: Normal breath sounds. No wheezing or rales.   Abdominal:      General: Bowel sounds are normal.      Palpations: Abdomen is soft.      Tenderness: There is no abdominal tenderness. There is no guarding or rebound.   Musculoskeletal:         General: No swelling. Normal range of motion.      Cervical back: Normal range of motion and neck supple.   Lymphadenopathy:      Cervical: No cervical adenopathy.      Upper Body:      Right upper body: No supraclavicular adenopathy.      Left upper body: No supraclavicular adenopathy.   Skin:     Findings: No erythema or rash.   Neurological:      Mental Status: He is alert and oriented to person, place, and time.      Cranial Nerves: No cranial nerve deficit.   Psychiatric:         Mood and Affect: Mood normal.         Behavior: Behavior normal.              Pathology 4/4/23  Small intestine, segmental resection:   - Invasive adenocarcinoma with partial mucinous component (40% of tumor)       - Tumor invades through muscularis propria into subserosa   - Lymphovascular invasion is present   - 17 lymph nodes, 3 positive for metastatic carcinoma  (3/17)   - Resection margins free of tumor   - Background small intestinal mucosa without significant histopathologic alteration   - See CAP Tumor synoptic and comment     CAP Tumor Synoptic:   Procedure:  Segmental resection   Tumor site:  Small intestine, not otherwise specified   Histologic type:  Adenocarcinoma (not otherwise characterized), with mucinous component comprising approximately 40% of tumor   Histologic grade:  G2, moderately differentiated   Tumor size:  5.5 cm in greatest dimension   Tumor extent:  Invades through muscularis propria into subserosa without serosal penetration   Lymphovascular invasion:  Present   Macroscopic tumor perforation:  Not identified   Margin status for invasive carcinoma:  All margins negative for invasive carcinoma   Margin status for dysplasia:  All margins negative for carcinoma in situ (high-grade dysplasia)/ adenoma   Regional lymph node status:  Tumor present in regional lymph nodes   Number of lymph nodes with tumor:  3   Number of lymph nodes examined:  17   PATHOLOGIC STAGE CLASSIFICATION   TNM descriptors:  Not applicable   pT Category:  pT3   pN Category:  pN2   Special studies:  Intact expression of DNA mismatch repair proteins (MLH1, MSH2, MSH6, PMS2) in tumor by immunohistochemistry (performed on prior biopsy KES-, collected 3/20/2023); interpreted by Dr. Garcias)   Comment:  Records from this patient's reported recent colonoscopy are not available for review.       CT a/p w/contrast 4/13/23  Impression:     Postoperative changes from partial small-bowel resection for a small bowel tumor.  Pathology results are pending.     Skin thickening and subcutaneous edema near the umbilicus, which could be inflammatory or infectious.  No organized fluid collections along the surgical incision.     Dilated small bowel loops with gradual tapering distally, likely ileus in the early postoperative setting.     Small volume ascites and trace pneumoperitoneum, likely  postoperative.  Anastomotic leak is less likely.  No organized intraperitoneal collections.     Other findings as described.        CT chest w/out contrast  5/1/23   Impression:     Asymmetric thyroid gland with possible left lobe thyroid nodule measuring up to 2.2 cm.  Further evaluation with thyroid ultrasound examination is recommended.     Mild centrilobular emphysematous changes.     Mucous plugging within the left upper lobe laterally and within the right lower lobe laterally.     Cluster of pulmonary nodules within the upper left upper lobe measuring up to 0.6 cm in size.  These are likely infectious/inflammatory in etiology, however continued surveillance is recommended in this patient with history of malignancy.     Hepatic cysts.         CT c/a/p 7/3/2023   Impression:     1. Postsurgical changes compatible with partial small bowel resection with enteroenteric anastomosis and surgical sutures in the region left upper quadrant, not significantly changed.  2. Near complete interval resolution of previously noted cluster of pulmonary nodules in the left upper lobe.  Cluster of approximately 3 ground-glass pulmonary micro nodules in the left upper lobe and, second cluster of approximately 3 ground-glass pulmonary micro nodules in the right lower lobe, not significantly changed.  A few borderline enlarged mediastinal lymph nodes, not significantly changed.  3. Multiple simple appearing hepatic parenchymal cyst and multiple additional subcentimeter hypodensities that are too small to accurately characterize, not significantly changed.  4. 0.4-cm parenchymal hypodensity in the pancreatic head, not significantly changed.  5. Simple appearing renal cortical cystic lesion in the lateral aspect of the right midpole, not significantly changed. Multiple additional subcentimeter renal cortical hypodensities bilaterally are too small to accurately characterize but not significantly changed when compared to  prior.        Lab Results   Component Value Date    WBC 3.06 (L) 12/13/2024    HGB 14.3 12/13/2024    HCT 43.5 12/13/2024    MCV 95 12/13/2024     12/13/2024       Hep B Core Total Ab Non-reactive Reactive Abnormal          Hepatitis B Surface Ag Non-reactive Non-reactive    Hep B C IgM Non-reactive Non-reactive    Hep A IgM Non-reactive Non-reactive    Hepatitis C Ab Non-reactive Non-reactive        Component      Latest Ref Rng 7/7/2023 7/24/2023 9/8/2023 11/7/2023   CEA      0.0 - 5.0 ng/mL 2.4  2.4  3.0  3.8       Latest Reference Range & Units 12/08/23 09:51 02/08/24 09:34   CEA 0.0 - 5.0 ng/mL 3.2 2.7        Latest Reference Range & Units Most Recent 09/13/24 07:57   CEA 0.0 - 5.0 ng/mL 2.2  9/13/24 07:57 2.2         CT c/a/p w/ IV contrast 11/29/23  Impression:     1. Postsurgical changes compatible with partial small bowel resection with enteroenteric anastomosis and surgical sutures in the region left upper quadrant, not significantly changed.  2. Cluster of micro nodules in the bilateral upper lobes and right lower lobe and, a few borderline enlarged mediastinal lymph nodes, not significantly changed.  3. Multiple simple appearing hepatic parenchymal cyst and multiple additional subcentimeter hypodensities that are too small to accurately characterize, not significantly changed.  4. 0.4-cm parenchymal hypodensity in the pancreatic head, not significantly changed.  5. Simple appearing right midpole renal cortical cystic lesion urine, multiple additional subcentimeter renal cortical hypodensities bilaterally are too small to accurately characterize but not significantly changed when compared to p        CT c/a/p w/contrast 5/6/24  Impression:     In patient with history small bowel adenocarcinoma, there are postoperative changes of prior partial small bowel resection.  No evidence of new or worsening metastatic disease in the chest, abdomen or pelvis.     Additional findings in the body of the  report.     There are no measurable lesions per RECIST criteria.      EXAMINATION:  CT CHEST ABDOMEN PELVIS WITH IV CONTRAST (XPD) 12/13/24      CLINICAL HISTORY:  Metastatic disease evaluation;     TECHNIQUE:  5 mm axial images were obtained through the chest, abdomen and pelvis following administration of 100 cc of Omnipaque 350 IV contrast and oral contrast.  Coronal and sagittal reformats were performed.     COMPARISON:  CT 05/06/2024, 07/03/2023, thyroid ultrasound 05/01/2024.     FINDINGS:  Base of the neck: Enlarged multinodular left thyroid lobe, better evaluated on previous ultrasound.     Thoracic Aorta: Left-sided aortic arch.  No aneurysm.  No dissection.     Pulmonary Arteries: Distribute normally.  No filling defects to suggest a thromboembolus.     Heart: Normal in size.    No pericardial effusion.  Pulmonary veins appear unremarkable.     Thoracic lymph nodes: No axillary, supraclavicular, mediastinal or hilar lymphadenopathy.     Esophagus: Normal in caliber and course.     Trachea and proximal airways: Patent.     Lungs: Volumes are normal and symmetric.  Stable bilateral solid pulmonary nodules.  No new or enlarging pulmonary nodules.  No mass, consolidation, pneumothorax or pleural effusion. No bronchiectasis.     Liver: Normal in size.  Numerous hepatic cysts, stable when compared to the previous exam.  No enhancing hepatic lesions.  No intrahepatic bile duct dilatation.Portal vein, splenic vein and SMV are patent.     Gallbladder: Unremarkable.  Extra-hepatic bile ducts are normal in caliber.     Stomach: Unremarkable.     Duodenum: Unremarkable.     Spleen: Normal in size. No focal lesions.     Pancreas: Unremarkable.     Adrenal glands: Unremarkable     Kidneys: Normal in size and location.  Bilateral renal cyst and too small to characterize hypodensities.  No cortical enhancing lesions.  No nephrolithiasis.  No hydronephrosis or hydroureter.  Bladder is fluid filled and unremarkable.      Genital organs: Prostate is enlarged.No pelvic free fluid.     Bowel: Postsurgical changes of partial small-bowel resection.  Residual small bowel is normal in caliber without lesions.  Scattered colonic diverticuli.  Appendix is normal.  No obstruction or inflammatory changes.     Aorta: Celiac artery, SMA, bilateral renal arteries and HERMANN are patent.  No aneurysm.  No dissection.     IVC/Veins: Not well evaluated secondary to incomplete contrast opacification.     Peritoneum/mesentery: No ascites or intra-abdominal free air.  No focal mesenteric masses.     Lymph nodes: No lymphadenopathy.     Subcutaneous soft tissues: Right chest port with catheter tip at the cavoatrial junction.  Diastasis recti with bowel extending into the defect.  Right iliopsoas intramuscular lipoma.     Osseous structures: Advanced degenerative changes of the lumbar spine and left femoroacetabular joint.  No acute fractures. No suspicious lytic or blastic lesions.     Impression:     1. Patient with a history of small bowel adenocarcinoma status post partial small-bowel resection.  No evidence of metastatic disease in the chest, abdomen or pelvis.  2. Additional findings as detailed in the body of the report.  There are no measurable lesions per RECIST criteria.                 Assessment and Plan     Problem List Items Addressed This Visit           Diagnoses and all orders for this visit:    Hx of Adenocarcinoma of small bowel  Poli Young is a 67 y.o. male with biopsy proven small bowel adenocarcinoma  status post small bowel resection on 4/4/23.pT3 pN2M0  CT a/p no evidence of distant mets  S/p C12 adjuvant chemo FOLFOX completed 11/22/23  Follow up CT imaging 5/6/24- INES recurrence   ctDNA -signatera NEG 9/2024 -neg  Labs reviewed  Follow up in 3mos with labs  Plan follow up CT imaging     -- There are no guidelines for post-treatment surveillance for small bowel adenocarcinoma from the American Society of Clinical Oncology  "(ASCO) or the  Society of Medical Oncology (ESMO). Guidelines from the NCCN generally follow a similar approach as for resected colon cancer, but with some exceptions [5] (see "  ?History and physical examination every 3 to 6 months for two years, then every 6 months for a total of five years  ?Chest/abdomen/pelvic CT every 6 to 12 months for two years, then annually for a total of five years  ?Assay of tumor markers CEA and/or CA 19-9-every 3 to 6 months for two years, then every 6 months for a total of five years  ?Routine capsule endoscopy is not indicated          Hep B core antibody positive   F/b  Hepatology  Last dose of chemo 11/2023, can stop Vemlidy 5/2025 according to Hepatology  Sero w/u labs negative, alk phos and liver enzymes and fibroscan all returned to normal   Cont Vemlidy until may 2025     Thyroid nodule  US thyroid complete and nodule bx with benign results    Pulmonary nodules  CT imaging 12/13/2024 -Stable bilateral pulm nodules  Follow up CT imaging may 2025   Monitor     Chemotherapy-induced neuropathy  improved    Visit today included increased complexity associated with the care of the episodic problem pulmonary nodule addressed and managing the longitudinal care of the patient due to the serious and/or complex managed problem(s) history of adenocarcinoma of small bowel        PAC flush today -send to chemo for PAC flush-CONFIRM w/chemo  Cbc,cmp, CEA ,  today ( if not resulted)  Schedule CT in MAY 2025  Cbc, cmp, prior to f/u June         Amparo Michaels MD  [facility]        "

## 2025-03-25 NOTE — Clinical Note
PAC flush today -send to chemo for PAC flush-CONFIRM w/chemo Cbc,cmp, CEA ,  today ( if not resulted)  Schedule CT in MAY 2025  Cbc, cmp, prior to f/u Claire

## 2025-03-26 RX ORDER — PANTOPRAZOLE SODIUM 20 MG/1
20 TABLET, DELAYED RELEASE ORAL
Qty: 30 TABLET | Refills: 0 | OUTPATIENT
Start: 2025-03-26

## 2025-04-17 ENCOUNTER — RESULTS FOLLOW-UP (OUTPATIENT)
Dept: ENDOCRINOLOGY | Facility: CLINIC | Age: 70
End: 2025-04-17

## 2025-04-23 ENCOUNTER — PATIENT MESSAGE (OUTPATIENT)
Dept: ENDOCRINOLOGY | Facility: CLINIC | Age: 70
End: 2025-04-23
Payer: MEDICARE

## 2025-05-01 ENCOUNTER — PATIENT MESSAGE (OUTPATIENT)
Dept: HEPATOLOGY | Facility: CLINIC | Age: 70
End: 2025-05-01
Payer: MEDICARE

## 2025-06-04 ENCOUNTER — INFUSION (OUTPATIENT)
Dept: INFUSION THERAPY | Facility: HOSPITAL | Age: 70
End: 2025-06-04
Attending: INTERNAL MEDICINE
Payer: MEDICARE

## 2025-06-04 ENCOUNTER — OFFICE VISIT (OUTPATIENT)
Dept: HEMATOLOGY/ONCOLOGY | Facility: CLINIC | Age: 70
End: 2025-06-04
Payer: MEDICARE

## 2025-06-04 VITALS
HEART RATE: 76 BPM | SYSTOLIC BLOOD PRESSURE: 124 MMHG | TEMPERATURE: 98 F | HEIGHT: 72 IN | DIASTOLIC BLOOD PRESSURE: 70 MMHG | WEIGHT: 214.5 LBS | BODY MASS INDEX: 29.05 KG/M2 | OXYGEN SATURATION: 98 %

## 2025-06-04 VITALS
SYSTOLIC BLOOD PRESSURE: 142 MMHG | DIASTOLIC BLOOD PRESSURE: 83 MMHG | TEMPERATURE: 98 F | RESPIRATION RATE: 16 BRPM | OXYGEN SATURATION: 98 % | HEART RATE: 65 BPM

## 2025-06-04 DIAGNOSIS — R91.8 PULMONARY NODULES: ICD-10-CM

## 2025-06-04 DIAGNOSIS — T45.1X5A CHEMOTHERAPY-INDUCED NEUROPATHY: ICD-10-CM

## 2025-06-04 DIAGNOSIS — E04.1 THYROID NODULE: ICD-10-CM

## 2025-06-04 DIAGNOSIS — R76.8 HEPATITIS B CORE ANTIBODY POSITIVE: ICD-10-CM

## 2025-06-04 DIAGNOSIS — C17.9 ADENOCARCINOMA OF SMALL BOWEL: Primary | ICD-10-CM

## 2025-06-04 DIAGNOSIS — G62.0 CHEMOTHERAPY-INDUCED NEUROPATHY: ICD-10-CM

## 2025-06-04 PROCEDURE — 99214 OFFICE O/P EST MOD 30 MIN: CPT | Mod: S$GLB,,, | Performed by: INTERNAL MEDICINE

## 2025-06-04 PROCEDURE — 1125F AMNT PAIN NOTED PAIN PRSNT: CPT | Mod: CPTII,S$GLB,, | Performed by: INTERNAL MEDICINE

## 2025-06-04 PROCEDURE — 3008F BODY MASS INDEX DOCD: CPT | Mod: CPTII,S$GLB,, | Performed by: INTERNAL MEDICINE

## 2025-06-04 PROCEDURE — 3074F SYST BP LT 130 MM HG: CPT | Mod: CPTII,S$GLB,, | Performed by: INTERNAL MEDICINE

## 2025-06-04 PROCEDURE — G2211 COMPLEX E/M VISIT ADD ON: HCPCS | Mod: S$GLB,,, | Performed by: INTERNAL MEDICINE

## 2025-06-04 PROCEDURE — 4010F ACE/ARB THERAPY RXD/TAKEN: CPT | Mod: CPTII,S$GLB,, | Performed by: INTERNAL MEDICINE

## 2025-06-04 PROCEDURE — 3078F DIAST BP <80 MM HG: CPT | Mod: CPTII,S$GLB,, | Performed by: INTERNAL MEDICINE

## 2025-06-04 PROCEDURE — 63600175 PHARM REV CODE 636 W HCPCS: Performed by: INTERNAL MEDICINE

## 2025-06-04 PROCEDURE — 99999 PR PBB SHADOW E&M-EST. PATIENT-LVL V: CPT | Mod: PBBFAC,,, | Performed by: INTERNAL MEDICINE

## 2025-06-04 PROCEDURE — 96523 IRRIG DRUG DELIVERY DEVICE: CPT

## 2025-06-04 RX ORDER — HEPARIN 100 UNIT/ML
500 SYRINGE INTRAVENOUS
Status: DISCONTINUED | OUTPATIENT
Start: 2025-06-04 | End: 2025-06-04 | Stop reason: HOSPADM

## 2025-06-04 RX ORDER — HEPARIN 100 UNIT/ML
500 SYRINGE INTRAVENOUS
OUTPATIENT
Start: 2025-06-04

## 2025-06-04 RX ORDER — SODIUM CHLORIDE 0.9 % (FLUSH) 0.9 %
10 SYRINGE (ML) INJECTION
Status: DISCONTINUED | OUTPATIENT
Start: 2025-06-04 | End: 2025-06-04 | Stop reason: HOSPADM

## 2025-06-04 RX ORDER — SODIUM CHLORIDE 0.9 % (FLUSH) 0.9 %
10 SYRINGE (ML) INJECTION
OUTPATIENT
Start: 2025-06-04

## 2025-06-04 RX ADMIN — HEPARIN 500 UNITS: 100 SYRINGE at 10:06

## 2025-06-04 NOTE — PROGRESS NOTES
Subjective       Patient ID: Poli Young is a 69 y.o. male.    Chief Complaint: Follow-up (Pt c/o frequent urination X 2-3 weeks. No pain. 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 Heunderwent work up for anemia and was found to have a small bowel mass about 100cm from IC valve by enteroscopy by Dr Cruz. Got one blood transfusion. He reports history of left central intermittent abdominal pain for several months. No NV. Was found to be anemic on bloodwork. EGD in June 2022 did not show any obvious cause of bleeding.  Capsule endoscopy in January showed blood in small bowel. Enteroscopy showed mass in March that was biopsied and tattooed. Biopsy positive for adenocarcinoma. He is status post small bowel resection on 4/4/23. Pathology positive for  Invasive adenocarcinoma with partial mucinous component (40% of tumor) Lymphovascular invasion is present 17 lymph nodes, 3 positive for metastatic carcinoma (3/17) .Resection margins free of tumor  Pathologic stage pT3 pN Category:  pN2 CT a/p w/contrast 4/13/23 shows Postoperative changes from partial small-bowel resection for a small bowel tumor . No evidence of distant metastases.      Interval Hx: Doing well  Accompanied by wife  No new issues   No melena, hematochezia or change in bowel habits intermittent tingling/numbness in extremities feet> hands -improved    Completed   Vemlidy for pos Hep B  core     Sochx: Retired . Never smoked. Drinks etoh occasionally.   .       Past Medical History:   Diagnosis Date    Hyperlipemia     Hypertension        Past Surgical History:   Procedure Laterality Date    CYST REMOVAL Left 06/02/2025    DIAGNOSTIC LAPAROSCOPY N/A 04/04/2023    Procedure: LAPAROSCOPY, DIAGNOSTIC;  Surgeon: Tyrone Martínez MD;  Location:  [facility] OR;  Service: General;  Laterality: N/A;    ESOPHAGOGASTRODUODENOSCOPY N/A 06/08/2022    Procedure: EGD (ESOPHAGOGASTRODUODENOSCOPY);  Surgeon: Aamir Rushing MD;  Location: [facility] ENDO;  Service: Endoscopy;  Laterality: N/A;    EXCISION, SMALL INTESTINE N/A 04/04/2023    Procedure: EXCISION, SMALL INTESTINE/SMALL BOWEL RESECTION;  Surgeon: Tyrone Martínez MD;  Location: [facility] OR;  Service: General;  Laterality: N/A;    INSERTION OF VENOUS ACCESS PORT Right 05/02/2023    Procedure: INSERTION, VENOUS ACCESS PORT;  Surgeon: Tyrone Martínez MD;  Location: [facility] OR;  Service: General;  Laterality: Right;    INTRALUMINAL GASTROINTESTINAL TRACT IMAGING VIA CAPSULE N/A 01/20/2023    Procedure: IMAGING PROCEDURE, GI TRACT, INTRALUMINAL, VIA CAPSULE;  Surgeon: Aamir Rushing MD;  Location: [facility] ENDO;  Service: Endoscopy;  Laterality: N/A;    LAPAROTOMY, EXPLORATORY N/A 04/04/2023    Procedure: LAPAROTOMY, EXPLORATORY;  Surgeon: Tyrone Martínez MD;  Location: [facility] OR;  Service: General;  Laterality: N/A;    SMALL BOWEL ENTEROSCOPY N/A 03/20/2023    Procedure: ENTEROSCOPY Upper DBE;  Surgeon: Giancarlo Cruz MD;  Location: [facility];  Service: Endoscopy;  Laterality: N/A;        Social History     Tobacco Use    Smoking status: Never    Smokeless tobacco: Never   Substance Use Topics    Alcohol use: Not Currently     Comment: daily use for years, stopped    Drug use: Never        Review of Systems   Constitutional:  Negative for appetite change, fatigue, fever and unexpected weight change.   HENT:  Negative for mouth sores.    Eyes:  Negative for visual disturbance.   Respiratory:  Negative for cough and shortness of breath.    Cardiovascular:  Negative for chest pain.   Gastrointestinal:  Negative for abdominal pain and diarrhea.   Genitourinary:  Negative for frequency.   Musculoskeletal:  Negative for back pain.   Integumentary:  Negative for rash.   Neurological:  Positive for numbness. Negative for  headaches.   Hematological:  Negative for adenopathy.   Psychiatric/Behavioral:  The patient is not nervous/anxious.           Objective       Vitals:    06/04/25 0923   BP: 124/70   Pulse: 76   Temp: 98 °F (36.7 °C)   SpO2: 98%   Weight: 97.3 kg (214 lb 8.1 oz)   Height: 6' (1.829 m)       Physical Exam  Constitutional:       Appearance: He is well-developed.   HENT:      Head: Normocephalic.      Mouth/Throat:      Pharynx: No oropharyngeal exudate.   Eyes:      General: No scleral icterus.        Right eye: No discharge.         Left eye: No discharge.      Conjunctiva/sclera: Conjunctivae normal.   Neck:      Thyroid: No thyromegaly.   Cardiovascular:      Rate and Rhythm: Normal rate and regular rhythm.      Heart sounds: Normal heart sounds. No murmur heard.  Pulmonary:      Effort: Pulmonary effort is normal.      Breath sounds: Normal breath sounds. No wheezing or rales.   Abdominal:      General: Bowel sounds are normal.      Palpations: Abdomen is soft.      Tenderness: There is no abdominal tenderness. There is no guarding or rebound.   Musculoskeletal:         General: No swelling. Normal range of motion.      Cervical back: Normal range of motion and neck supple.   Lymphadenopathy:      Cervical: No cervical adenopathy.      Upper Body:      Right upper body: No supraclavicular adenopathy.      Left upper body: No supraclavicular adenopathy.   Skin:     Findings: No erythema or rash.   Neurological:      Mental Status: He is alert and oriented to person, place, and time.      Cranial Nerves: No cranial nerve deficit.   Psychiatric:         Mood and Affect: Mood normal.         Behavior: Behavior normal.              Pathology 4/4/23  Small intestine, segmental resection:   - Invasive adenocarcinoma with partial mucinous component (40% of tumor)       - Tumor invades through muscularis propria into subserosa   - Lymphovascular invasion is present   - 17 lymph nodes, 3 positive for metastatic carcinoma  (3/17)   - Resection margins free of tumor   - Background small intestinal mucosa without significant histopathologic alteration   - See CAP Tumor synoptic and comment     CAP Tumor Synoptic:   Procedure:  Segmental resection   Tumor site:  Small intestine, not otherwise specified   Histologic type:  Adenocarcinoma (not otherwise characterized), with mucinous component comprising approximately 40% of tumor   Histologic grade:  G2, moderately differentiated   Tumor size:  5.5 cm in greatest dimension   Tumor extent:  Invades through muscularis propria into subserosa without serosal penetration   Lymphovascular invasion:  Present   Macroscopic tumor perforation:  Not identified   Margin status for invasive carcinoma:  All margins negative for invasive carcinoma   Margin status for dysplasia:  All margins negative for carcinoma in situ (high-grade dysplasia)/ adenoma   Regional lymph node status:  Tumor present in regional lymph nodes   Number of lymph nodes with tumor:  3   Number of lymph nodes examined:  17   PATHOLOGIC STAGE CLASSIFICATION   TNM descriptors:  Not applicable   pT Category:  pT3   pN Category:  pN2   Special studies:  Intact expression of DNA mismatch repair proteins (MLH1, MSH2, MSH6, PMS2) in tumor by immunohistochemistry (performed on prior biopsy KES-, collected 3/20/2023); interpreted by Dr. Garcias)   Comment:  Records from this patient's reported recent colonoscopy are not available for review.       CT a/p w/contrast 4/13/23  Impression:     Postoperative changes from partial small-bowel resection for a small bowel tumor.  Pathology results are pending.     Skin thickening and subcutaneous edema near the umbilicus, which could be inflammatory or infectious.  No organized fluid collections along the surgical incision.     Dilated small bowel loops with gradual tapering distally, likely ileus in the early postoperative setting.     Small volume ascites and trace pneumoperitoneum, likely  postoperative.  Anastomotic leak is less likely.  No organized intraperitoneal collections.     Other findings as described.        CT chest w/out contrast  5/1/23   Impression:     Asymmetric thyroid gland with possible left lobe thyroid nodule measuring up to 2.2 cm.  Further evaluation with thyroid ultrasound examination is recommended.     Mild centrilobular emphysematous changes.     Mucous plugging within the left upper lobe laterally and within the right lower lobe laterally.     Cluster of pulmonary nodules within the upper left upper lobe measuring up to 0.6 cm in size.  These are likely infectious/inflammatory in etiology, however continued surveillance is recommended in this patient with history of malignancy.     Hepatic cysts.         CT c/a/p 7/3/2023   Impression:     1. Postsurgical changes compatible with partial small bowel resection with enteroenteric anastomosis and surgical sutures in the region left upper quadrant, not significantly changed.  2. Near complete interval resolution of previously noted cluster of pulmonary nodules in the left upper lobe.  Cluster of approximately 3 ground-glass pulmonary micro nodules in the left upper lobe and, second cluster of approximately 3 ground-glass pulmonary micro nodules in the right lower lobe, not significantly changed.  A few borderline enlarged mediastinal lymph nodes, not significantly changed.  3. Multiple simple appearing hepatic parenchymal cyst and multiple additional subcentimeter hypodensities that are too small to accurately characterize, not significantly changed.  4. 0.4-cm parenchymal hypodensity in the pancreatic head, not significantly changed.  5. Simple appearing renal cortical cystic lesion in the lateral aspect of the right midpole, not significantly changed. Multiple additional subcentimeter renal cortical hypodensities bilaterally are too small to accurately characterize but not significantly changed when compared to  prior.        Lab Results   Component Value Date    WBC 2.97 (L) 05/30/2025    HGB 14.1 05/30/2025    HCT 41.8 05/30/2025    MCV 92 05/30/2025     05/30/2025       Hep B Core Total Ab Non-reactive Reactive Abnormal          Hepatitis B Surface Ag Non-reactive Non-reactive    Hep B C IgM Non-reactive Non-reactive    Hep A IgM Non-reactive Non-reactive    Hepatitis C Ab Non-reactive Non-reactive        Component      Latest Ref Rng 7/7/2023 7/24/2023 9/8/2023 11/7/2023   CEA      0.0 - 5.0 ng/mL 2.4  2.4  3.0  3.8       Latest Reference Range & Units 12/08/23 09:51 02/08/24 09:34   CEA 0.0 - 5.0 ng/mL 3.2 2.7        Latest Reference Range & Units Most Recent 09/13/24 07:57   CEA 0.0 - 5.0 ng/mL 2.2  9/13/24 07:57 2.2         CT c/a/p w/ IV contrast 11/29/23  Impression:     1. Postsurgical changes compatible with partial small bowel resection with enteroenteric anastomosis and surgical sutures in the region left upper quadrant, not significantly changed.  2. Cluster of micro nodules in the bilateral upper lobes and right lower lobe and, a few borderline enlarged mediastinal lymph nodes, not significantly changed.  3. Multiple simple appearing hepatic parenchymal cyst and multiple additional subcentimeter hypodensities that are too small to accurately characterize, not significantly changed.  4. 0.4-cm parenchymal hypodensity in the pancreatic head, not significantly changed.  5. Simple appearing right midpole renal cortical cystic lesion urine, multiple additional subcentimeter renal cortical hypodensities bilaterally are too small to accurately characterize but not significantly changed when compared to p        CT c/a/p w/contrast 5/6/24  Impression:     In patient with history small bowel adenocarcinoma, there are postoperative changes of prior partial small bowel resection.  No evidence of new or worsening metastatic disease in the chest, abdomen or pelvis.     Additional findings in the body of the  report.     There are no measurable lesions per RECIST criteria.      EXAMINATION:  CT CHEST ABDOMEN PELVIS WITH IV CONTRAST (XPD) 12/13/24      CLINICAL HISTORY:  Metastatic disease evaluation;     TECHNIQUE:  5 mm axial images were obtained through the chest, abdomen and pelvis following administration of 100 cc of Omnipaque 350 IV contrast and oral contrast.  Coronal and sagittal reformats were performed.     COMPARISON:  CT 05/06/2024, 07/03/2023, thyroid ultrasound 05/01/2024.     FINDINGS:  Base of the neck: Enlarged multinodular left thyroid lobe, better evaluated on previous ultrasound.     Thoracic Aorta: Left-sided aortic arch.  No aneurysm.  No dissection.     Pulmonary Arteries: Distribute normally.  No filling defects to suggest a thromboembolus.     Heart: Normal in size.    No pericardial effusion.  Pulmonary veins appear unremarkable.     Thoracic lymph nodes: No axillary, supraclavicular, mediastinal or hilar lymphadenopathy.     Esophagus: Normal in caliber and course.     Trachea and proximal airways: Patent.     Lungs: Volumes are normal and symmetric.  Stable bilateral solid pulmonary nodules.  No new or enlarging pulmonary nodules.  No mass, consolidation, pneumothorax or pleural effusion. No bronchiectasis.     Liver: Normal in size.  Numerous hepatic cysts, stable when compared to the previous exam.  No enhancing hepatic lesions.  No intrahepatic bile duct dilatation.Portal vein, splenic vein and SMV are patent.     Gallbladder: Unremarkable.  Extra-hepatic bile ducts are normal in caliber.     Stomach: Unremarkable.     Duodenum: Unremarkable.     Spleen: Normal in size. No focal lesions.     Pancreas: Unremarkable.     Adrenal glands: Unremarkable     Kidneys: Normal in size and location.  Bilateral renal cyst and too small to characterize hypodensities.  No cortical enhancing lesions.  No nephrolithiasis.  No hydronephrosis or hydroureter.  Bladder is fluid filled and unremarkable.      Genital organs: Prostate is enlarged.No pelvic free fluid.     Bowel: Postsurgical changes of partial small-bowel resection.  Residual small bowel is normal in caliber without lesions.  Scattered colonic diverticuli.  Appendix is normal.  No obstruction or inflammatory changes.     Aorta: Celiac artery, SMA, bilateral renal arteries and HERMANN are patent.  No aneurysm.  No dissection.     IVC/Veins: Not well evaluated secondary to incomplete contrast opacification.     Peritoneum/mesentery: No ascites or intra-abdominal free air.  No focal mesenteric masses.     Lymph nodes: No lymphadenopathy.     Subcutaneous soft tissues: Right chest port with catheter tip at the cavoatrial junction.  Diastasis recti with bowel extending into the defect.  Right iliopsoas intramuscular lipoma.     Osseous structures: Advanced degenerative changes of the lumbar spine and left femoroacetabular joint.  No acute fractures. No suspicious lytic or blastic lesions.     Impression:     1. Patient with a history of small bowel adenocarcinoma status post partial small-bowel resection.  No evidence of metastatic disease in the chest, abdomen or pelvis.  2. Additional findings as detailed in the body of the report.  There are no measurable lesions per RECIST criteria.                 Assessment and Plan     Problem List Items Addressed This Visit           Diagnoses and all orders for this visit:    Hx of Adenocarcinoma of small bowel  Poli Young is a 67 y.o. male with biopsy proven small bowel adenocarcinoma  status post small bowel resection on 4/4/23.pT3 pN2M0  CT a/p no evidence of distant mets  S/p C12 adjuvant chemo FOLFOX completed 11/22/23  Follow up CT imaging 5/6/24- [facility] recurrence   ctDNA -[facility] NEG 9/2024 -neg  Plan follow up [facility] testing   Labs reviewed  Follow up in 3mos with labs  follow up CT imaging 5/2025 [facility]ES recurrence    -- There are no guidelines for post-treatment surveillance for small bowel adenocarcinoma  "from the American Society of Clinical Oncology (ASCO) or the European Society of Medical Oncology (ESMO). Guidelines from the NCCN generally follow a similar approach as for resected colon cancer, but with some exceptions [5] (see "  ?History and physical examination every 3 to 6 months for two years, then every 6 months for a total of five years  ?Chest/abdomen/pelvic CT every 6 to 12 months for two years, then annually for a total of five years  ?Assay of tumor markers CEA and/or CA 19-9-every 3 to 6 months for two years, then every 6 months for a total of five years  ?Routine capsule endoscopy is not indicated          Hep B core antibody positive   F/b  Hepatology  Last dose of chemo 11/2023, can stop Vemlidy 5/2025 according to Hepatology  Sero w/u labs negative, alk phos and liver enzymes and fibroscan all returned to normal   Completed  Vemlidy may 2025     Thyroid nodule  US thyroid complete and nodule bx with benign results    Pulmonary nodules  CT imaging 12/13/2024 -Stable bilateral pulm nodules  Follow up CT imaging may 2025 -INES recurrence  Monitor     Chemotherapy-induced neuropathy  Improved    ONGOING COMPLEXITY BILLING  Visit today is associated with current or anticipated ongoing medical care related to this patients single serious condition/complex condition: hx of small bowel adenocarcinoma          PAC flush today -send to chemo for PAC flush-CONFIRM w/chemo  Cbc,cmp, CEA , prior to f/u  4mos  [facility] testing -TOMEKA Michaels MD  Heme/Onc WB        "

## 2025-06-20 NOTE — PATIENT INSTRUCTIONS
Hold the protonix for 2 weeks  Then turn in stool    Then resume taking the protonix daily   Will need a peer to peer to cover medication Semaglutide-Weight Management (WEGOVY) 0.25 MG/0.5ML SOAJ SC injection

## 2025-08-04 ENCOUNTER — OFFICE VISIT (OUTPATIENT)
Dept: ENDOCRINOLOGY | Facility: CLINIC | Age: 70
End: 2025-08-04
Payer: MEDICARE

## 2025-08-04 VITALS
WEIGHT: 208.38 LBS | DIASTOLIC BLOOD PRESSURE: 79 MMHG | TEMPERATURE: 59 F | BODY MASS INDEX: 28.26 KG/M2 | SYSTOLIC BLOOD PRESSURE: 158 MMHG

## 2025-08-04 DIAGNOSIS — E04.1 THYROID NODULE: Primary | ICD-10-CM

## 2025-08-04 PROCEDURE — 3288F FALL RISK ASSESSMENT DOCD: CPT | Mod: CPTII,S$GLB,, | Performed by: HOSPITALIST

## 2025-08-04 PROCEDURE — 1101F PT FALLS ASSESS-DOCD LE1/YR: CPT | Mod: CPTII,S$GLB,, | Performed by: HOSPITALIST

## 2025-08-04 PROCEDURE — 3077F SYST BP >= 140 MM HG: CPT | Mod: CPTII,S$GLB,, | Performed by: HOSPITALIST

## 2025-08-04 PROCEDURE — 4010F ACE/ARB THERAPY RXD/TAKEN: CPT | Mod: CPTII,S$GLB,, | Performed by: HOSPITALIST

## 2025-08-04 PROCEDURE — 99214 OFFICE O/P EST MOD 30 MIN: CPT | Mod: S$GLB,,, | Performed by: HOSPITALIST

## 2025-08-04 PROCEDURE — 1160F RVW MEDS BY RX/DR IN RCRD: CPT | Mod: CPTII,S$GLB,, | Performed by: HOSPITALIST

## 2025-08-04 PROCEDURE — 1159F MED LIST DOCD IN RCRD: CPT | Mod: CPTII,S$GLB,, | Performed by: HOSPITALIST

## 2025-08-04 PROCEDURE — 99999 PR PBB SHADOW E&M-EST. PATIENT-LVL III: CPT | Mod: PBBFAC,,, | Performed by: HOSPITALIST

## 2025-08-04 PROCEDURE — 3008F BODY MASS INDEX DOCD: CPT | Mod: CPTII,S$GLB,, | Performed by: HOSPITALIST

## 2025-08-04 PROCEDURE — 3078F DIAST BP <80 MM HG: CPT | Mod: CPTII,S$GLB,, | Performed by: HOSPITALIST

## 2025-08-04 NOTE — ASSESSMENT & PLAN NOTE
- 06/2023 for left 2.7 cm thyroid nodule.  - Patient underwent FNA of large thyroid nodule 6/15/2023: Benign thyroid nodule  - Denies compressive symptoms, No dysphagia, No issue with swallowing   - Thyroid ultrasound done for 6/2024: Stable in size 2.8 cm left nodule now  - Ultrasound 2025 Thyroid nodule stable in size, measuring 2.6 cm x 1.7 cm x 1.6 cm on current ultrasound, compared to 2.8 cm last year.  - Thyroid function tests WNL.  - Given stable size and absence of concerning symptoms, continued annual monitoring appropriate.  - Biopsy not indicated unless nodule size increases   - Discussed scenarios that would warrant urgent intervention, such as difficulty swallowing or sensation of pressure in the neck.  - Follow up in 1 year for continued monitoring of thyroid nodule with ultrasound.

## 2025-08-04 NOTE — PROGRESS NOTES
Subjective:      Patient ID: Poli Young is a 69 y.o. male presented to [facility] on 8/4/2025.  Chief Complaint:  Thyroid Nodule    History of Present Illness: Poli Young is a 69 y.o. male here for thyroid nodule  Other significant past medical history: Small intestine cancer      INTERVAL HISTORY:  Here for follow-up of stable left 2.7 cm thyroid nodule.  FNA in the past benign.  Here for follow-up.  Denies any worsening,  compressive symptoms  No dysphagia  No issue with swallowing   Thyroid ultrasound done for 7/2025: Stable in size 2.6 cm left nodule now    History of Present Illness    Patient presents today for follow up of thyroid nodule. He has a known thyroid nodule status post biopsy in June 2023. He experiences occasional mild neck pain that is not significantly bothersome. He denies any difficulty swallowing or eating, choking sensation, or pressure in the neck region. He reports feeling generally well and has no significant concerns regarding the thyroid nodule. Thyroid hormone levels were normal.         1) Thyroid nodule   - Diagnosed by ultrasound in:  6/6/2023:  solid, part cystic nodule measuring up to 2.7 cm.  - Patient CT chest showing thyroid nodule on 05/2023  - Patient underwent FNA of large thyroid nodule 6/15/2023: Benign thyroid nodule  - Family history thyroid disorder: no  - Family history thyroid cancer: no  - Tobacco user: no    Symptoms:  No  Yes  [x]    [] Compressive symptoms  [x]    [] Anterior neck pressure  [x]    [] Dysphagia sometimes  [x]    [] Voice changes - comes and goes     Risk Factors:  No   Yes  [x]    [] Radiation to head or neck for any treatment of cancer or exposure to radiation  [x]    [] Personal history of cancer including:  colon or breast cancer    Symptoms of hyper or hypothyroidism:  Weight changes?: No  Diarrhea or Constipation?: No  Heat or cold intolerance?: No  Hair, nail or skin changes?: No  Chest pain, palpations or  shortness of breath?: No   Mental fog?: No      Last ultrasound:  7/28/2025  The right lobe of the thyroid measures 4.9 x 1.6 x 1.6 cm and is unremarkable.  The thyroid isthmus is unremarkable.  The left lobe of the thyroid measures 4.6 x 2.7 x 2.0 cm and demonstrates a 2.6 x 1.7 x 1.6 cm nodule (previously measuring 2.8 x 2.0 x 1.6 cm).  Nodule is solid, isoechoic, not taller than wide, demonstrates smooth margins, and does not demonstrate internal echogenic foci.  Nodule is a TR 3 and has previously undergone FNA.  Thyroid vascularity is within normal limits.     Few prominent bilateral cervical chain lymph nodes which demonstrate normal shape and preserved fatty hilum, possibly related to reactive lymphadenopathy.     Impression:  Dominant left 2.6 cm TR 3 nodule is stable in size from comparison imaging and has previously undergone FNA.  Correlation is advised.  Few prominent bilateral cervical chain lymph nodes which demonstrate normal shape and preserved fatty hilum, possibly related to reactive lymphadenopathy.    5/01/2024  Right thyroid lobe measures 4.6 x 1.3 x 1.7 cm.  Left lobe measures 4.5 x 1.5 x 1.8 cm.  Isthmus measures 0.3 cm.  There is a 2.8 x 2 x 1.6 cm mostly solid, isoechoic, wider than tall left lobe nodule with smooth borders and no internal echogenic foci, similar when compared to the previous ultrasound.  No new nodules.  Thyroid parenchymal vascularity appears within normal limits.  1.4 cm hypoechoic left cervical lymph nodes, possibly reactive.     Impression:  Stable left thyroid nodule.  Lesion was previously biopsied with benign pathology results.     6/6/2023  Right thyroid lobe measures 4.7 x 1.4 x 1.7 cm with homogeneous parenchyma.  Left thyroid lobe measures 4.4 x 2.3 x 1.9 cm.  Heterogeneous mostly solid, part cystic nodule measuring up to 2.7 cm.  Isthmus measures 0.3 cm.  No evidence for cervical adenopathy.     Impression:  Single measured nodule in the left lobe which meets  "criteria for FNA guided sampling.    Lab work reviewed  Lab Results   Component Value Date    TSH 0.952 03/24/2025    TSH 0.791 02/18/2025    TSH 1.011 09/02/2024    TSH 0.713 06/26/2023    FREET4 0.94 03/24/2025    FREET4 1.07 02/18/2025    FREET4 1.02 06/26/2023      Antibodies  No results found for: "THYROIDAB", "TSIMMGLBN", "THYROIDSTIMI", "THYROTROPINR"    Reviewed past surgical, medical, family, social history and updated as appropriate.  Review of Systems: see HPI above  Objective:   BP (!) 158/79   Temp (!) 59 °F (15 °C)   Wt 94.5 kg (208 lb 6.4 oz)   BMI 28.26 kg/m²   Body mass index is 28.26 kg/m².  Vital signs reviewed    Physical Exam  Vitals and nursing note reviewed.   Constitutional:       Appearance: He is well-developed. He is not toxic-appearing.   Neck:      Thyroid: No thyromegaly.      Comments: No thyroid enlargement noted  Cardiovascular:      Heart sounds: Normal heart sounds.   Pulmonary:      Effort: Pulmonary effort is normal.   Musculoskeletal:         General: No deformity. Normal range of motion.      Cervical back: Normal range of motion.   Neurological:      Mental Status: He is alert and oriented to person, place, and time.   Psychiatric:         Mood and Affect: Mood normal.     Lab Reviewed:  See results in subjective  No results found for: "HGBA1C"  No results found for: "CHOL", "HDL", "LDLCALC", "TRIG", "CHOLHDL"  Lab Results   Component Value Date     05/30/2025    K 4.5 05/30/2025     05/30/2025    CO2 20 (L) 05/30/2025     05/30/2025    BUN 21 05/30/2025    CREATININE 1.1 05/30/2025    CALCIUM 9.2 05/30/2025    PHOS 3.7 04/11/2023    PROT 7.9 05/30/2025    ALBUMIN 4.5 05/30/2025    BILITOT 0.5 05/30/2025    ALKPHOS 98 05/30/2025    AST 24 05/30/2025    ALT 17 05/30/2025    ANIONGAP 9 05/30/2025    EGFRNORACEVR >60 05/30/2025    TSH 0.952 03/24/2025     Assessment     1. Thyroid nodule  US Thyroid    TSH        Plan     Thyroid nodule  - 06/2023 for left " 2.7 cm thyroid nodule.  - Patient underwent FNA of large thyroid nodule 6/15/2023: Benign thyroid nodule  - Denies compressive symptoms, No dysphagia, No issue with swallowing   - Thyroid ultrasound done for 6/2024: Stable in size 2.8 cm left nodule now  - Ultrasound 2025 Thyroid nodule stable in size, measuring 2.6 cm x 1.7 cm x 1.6 cm on current ultrasound, compared to 2.8 cm last year.  - Thyroid function tests WNL.  - Given stable size and absence of concerning symptoms, continued annual monitoring appropriate.  - Biopsy not indicated unless nodule size increases   - Discussed scenarios that would warrant urgent intervention, such as difficulty swallowing or sensation of pressure in the neck.  - Follow up in 1 year for continued monitoring of thyroid nodule with ultrasound.      This note was generated with the assistance of ambient listening technology. Verbal consent was obtained by the patient and accompanying visitor(s) for the recording of patient appointment to facilitate this note. I attest to having reviewed and edited the generated note for accuracy, though some syntax or spelling errors may persist. Please contact the author of this note for any clarification.    Advised patient to follow up with PCP for routine health maintenance care.   RTC in yearly with ultrasound prior to visit    Edgardo Villatoro M.D.  Endocrinology  [facility]  8/4/2025      Disclaimer: This note has been generated in part with the use of voice-recognition software. There may be typographical errors that have been missed during proof-reading

## (undated) DEVICE — SUT PROLENE 4-0 MONO 18IN

## (undated) DEVICE — DRESSING TEGADERM 2 3/8 X 2.75

## (undated) DEVICE — MANIFOLD 4 PORT

## (undated) DEVICE — GLOVE SURGICAL LATEX SZ 7

## (undated) DEVICE — SUT VICRYL 4-0 27 SH

## (undated) DEVICE — CUTTER PROXIMATE BLUE 75MM

## (undated) DEVICE — TROCAR ENDOPATH XCEL 5MM 7.5CM

## (undated) DEVICE — STRIP MEDI WND CLSR 1/2X4IN

## (undated) DEVICE — SEALER LIGASURE OPEN 5MM 23CM

## (undated) DEVICE — ELECTRODE REM PLYHSV RETURN 9

## (undated) DEVICE — RELOAD PROXIMATE CUT BLUE 75MM

## (undated) DEVICE — APPLICATOR CHLORAPREP ORN 26ML

## (undated) DEVICE — BLADE SURG CARBON STEEL SZ11

## (undated) DEVICE — COVER PROBE 6X48

## (undated) DEVICE — TROCAR ENDOPATH XCEL 5X75MM

## (undated) DEVICE — GAUZE AVANT SPNG 4PLY STRL 4X4

## (undated) DEVICE — DRESSING TRANS 4X4 3/4

## (undated) DEVICE — CLOSURE SKIN STERI STRIP 1/2X4

## (undated) DEVICE — PACK SURGERY START

## (undated) DEVICE — SPONGE DERMA 8PLY 2X2

## (undated) DEVICE — TROCAR ENDOPATH XCEL 11MM 10CM

## (undated) DEVICE — SET DECANTER MEDICHOICE

## (undated) DEVICE — DRESSING AQUACEL SACRAL 9 X 9

## (undated) DEVICE — SYR 10CC LUER LOCK

## (undated) DEVICE — TOWEL OR DISP STRL BLUE 4/PK

## (undated) DEVICE — COVER OVERHEAD SURG LT BLUE

## (undated) DEVICE — SYR 30CC LUER LOCK

## (undated) DEVICE — ADHESIVE DERMABOND ADVANCED

## (undated) DEVICE — PACK BASIC

## (undated) DEVICE — DRAPE C-ARM/MOBILE XRAY 44X80

## (undated) DEVICE — DRESSING XEROFORM GAUZE 5X9

## (undated) DEVICE — STAPLER SKIN ROTATING HEAD

## (undated) DEVICE — SUT MONOCRYL 5-0 P-3 UND 18

## (undated) DEVICE — GOWN POLY REINF BRTH SLV LG

## (undated) DEVICE — NDL HYPO REG 25G X 1 1/2

## (undated) DEVICE — Device

## (undated) DEVICE — DRAPE T TRNSVRS LAP 102X78X121